# Patient Record
Sex: FEMALE | Race: WHITE | NOT HISPANIC OR LATINO | Employment: FULL TIME | URBAN - METROPOLITAN AREA
[De-identification: names, ages, dates, MRNs, and addresses within clinical notes are randomized per-mention and may not be internally consistent; named-entity substitution may affect disease eponyms.]

---

## 2017-03-17 ENCOUNTER — GENERIC CONVERSION - ENCOUNTER (OUTPATIENT)
Dept: OTHER | Facility: OTHER | Age: 27
End: 2017-03-17

## 2017-03-30 ENCOUNTER — ALLSCRIPTS OFFICE VISIT (OUTPATIENT)
Dept: OTHER | Facility: OTHER | Age: 27
End: 2017-03-30

## 2017-03-30 DIAGNOSIS — M54.9 DORSALGIA: ICD-10-CM

## 2017-04-13 ENCOUNTER — TRANSCRIBE ORDERS (OUTPATIENT)
Dept: RADIOLOGY | Facility: HOSPITAL | Age: 27
End: 2017-04-13

## 2017-04-13 ENCOUNTER — HOSPITAL ENCOUNTER (OUTPATIENT)
Dept: RADIOLOGY | Facility: HOSPITAL | Age: 27
Discharge: HOME | End: 2017-04-13
Payer: COMMERCIAL

## 2017-04-13 DIAGNOSIS — M54.9 DORSALGIA: ICD-10-CM

## 2017-04-13 PROCEDURE — 72110 X-RAY EXAM L-2 SPINE 4/>VWS: CPT

## 2017-04-14 ENCOUNTER — GENERIC CONVERSION - ENCOUNTER (OUTPATIENT)
Dept: OTHER | Facility: OTHER | Age: 27
End: 2017-04-14

## 2017-05-25 ENCOUNTER — HOSPITAL ENCOUNTER (EMERGENCY)
Facility: HOSPITAL | Age: 27
Discharge: HOME | End: 2017-05-25
Attending: EMERGENCY MEDICINE | Admitting: EMERGENCY MEDICINE
Payer: OTHER MISCELLANEOUS

## 2017-05-25 VITALS
SYSTOLIC BLOOD PRESSURE: 130 MMHG | RESPIRATION RATE: 18 BRPM | TEMPERATURE: 97.9 F | WEIGHT: 110 LBS | HEART RATE: 82 BPM | OXYGEN SATURATION: 99 % | HEIGHT: 64 IN | BODY MASS INDEX: 18.78 KG/M2 | DIASTOLIC BLOOD PRESSURE: 60 MMHG

## 2017-05-25 DIAGNOSIS — S06.0X9A CONCUSSION: Primary | ICD-10-CM

## 2017-05-25 PROCEDURE — 99283 EMERGENCY DEPT VISIT LOW MDM: CPT

## 2017-06-26 ENCOUNTER — ALLSCRIPTS OFFICE VISIT (OUTPATIENT)
Dept: OTHER | Facility: OTHER | Age: 27
End: 2017-06-26

## 2017-06-26 DIAGNOSIS — M54.9 DORSALGIA: ICD-10-CM

## 2017-07-06 ENCOUNTER — GENERIC CONVERSION - ENCOUNTER (OUTPATIENT)
Dept: OTHER | Facility: OTHER | Age: 27
End: 2017-07-06

## 2017-07-06 ENCOUNTER — APPOINTMENT (OUTPATIENT)
Dept: PHYSICAL THERAPY | Facility: REHABILITATION | Age: 27
End: 2017-07-06
Payer: COMMERCIAL

## 2017-07-06 DIAGNOSIS — M54.9 DORSALGIA: ICD-10-CM

## 2017-07-06 PROCEDURE — 97161 PT EVAL LOW COMPLEX 20 MIN: CPT

## 2017-07-06 PROCEDURE — 97110 THERAPEUTIC EXERCISES: CPT

## 2017-07-06 PROCEDURE — 97140 MANUAL THERAPY 1/> REGIONS: CPT

## 2017-07-12 ENCOUNTER — GENERIC CONVERSION - ENCOUNTER (OUTPATIENT)
Dept: OTHER | Facility: OTHER | Age: 27
End: 2017-07-12

## 2017-07-12 ENCOUNTER — APPOINTMENT (OUTPATIENT)
Dept: PHYSICAL THERAPY | Facility: REHABILITATION | Age: 27
End: 2017-07-12
Payer: COMMERCIAL

## 2017-07-12 PROCEDURE — 97140 MANUAL THERAPY 1/> REGIONS: CPT

## 2017-07-12 PROCEDURE — 97110 THERAPEUTIC EXERCISES: CPT

## 2017-07-12 PROCEDURE — 97112 NEUROMUSCULAR REEDUCATION: CPT

## 2017-07-14 ENCOUNTER — APPOINTMENT (OUTPATIENT)
Dept: PHYSICAL THERAPY | Facility: REHABILITATION | Age: 27
End: 2017-07-14
Payer: COMMERCIAL

## 2017-07-14 PROCEDURE — 97112 NEUROMUSCULAR REEDUCATION: CPT

## 2017-07-14 PROCEDURE — 97110 THERAPEUTIC EXERCISES: CPT

## 2017-07-14 PROCEDURE — 97140 MANUAL THERAPY 1/> REGIONS: CPT

## 2017-07-17 ENCOUNTER — APPOINTMENT (OUTPATIENT)
Dept: PHYSICAL THERAPY | Facility: REHABILITATION | Age: 27
End: 2017-07-17
Payer: COMMERCIAL

## 2017-07-17 PROCEDURE — 97530 THERAPEUTIC ACTIVITIES: CPT

## 2017-07-17 PROCEDURE — 97140 MANUAL THERAPY 1/> REGIONS: CPT

## 2017-07-19 ENCOUNTER — APPOINTMENT (OUTPATIENT)
Dept: PHYSICAL THERAPY | Facility: REHABILITATION | Age: 27
End: 2017-07-19
Payer: COMMERCIAL

## 2017-07-19 PROCEDURE — 97110 THERAPEUTIC EXERCISES: CPT

## 2017-07-19 PROCEDURE — 97112 NEUROMUSCULAR REEDUCATION: CPT

## 2017-07-19 PROCEDURE — 97140 MANUAL THERAPY 1/> REGIONS: CPT

## 2017-07-20 ENCOUNTER — GENERIC CONVERSION - ENCOUNTER (OUTPATIENT)
Dept: OTHER | Facility: OTHER | Age: 27
End: 2017-07-20

## 2017-07-24 ENCOUNTER — APPOINTMENT (OUTPATIENT)
Dept: PHYSICAL THERAPY | Facility: REHABILITATION | Age: 27
End: 2017-07-24
Payer: COMMERCIAL

## 2017-07-24 PROCEDURE — 97112 NEUROMUSCULAR REEDUCATION: CPT

## 2017-07-24 PROCEDURE — 97140 MANUAL THERAPY 1/> REGIONS: CPT

## 2017-07-24 PROCEDURE — 97110 THERAPEUTIC EXERCISES: CPT

## 2017-07-26 ENCOUNTER — APPOINTMENT (OUTPATIENT)
Dept: PHYSICAL THERAPY | Facility: REHABILITATION | Age: 27
End: 2017-07-26
Payer: COMMERCIAL

## 2017-07-26 PROCEDURE — 97112 NEUROMUSCULAR REEDUCATION: CPT

## 2017-07-26 PROCEDURE — 97140 MANUAL THERAPY 1/> REGIONS: CPT

## 2017-07-26 PROCEDURE — 97110 THERAPEUTIC EXERCISES: CPT

## 2017-07-31 ENCOUNTER — GENERIC CONVERSION - ENCOUNTER (OUTPATIENT)
Dept: OTHER | Facility: OTHER | Age: 27
End: 2017-07-31

## 2017-07-31 ENCOUNTER — APPOINTMENT (OUTPATIENT)
Dept: PHYSICAL THERAPY | Facility: REHABILITATION | Age: 27
End: 2017-07-31
Payer: COMMERCIAL

## 2017-07-31 PROCEDURE — 97112 NEUROMUSCULAR REEDUCATION: CPT

## 2017-07-31 PROCEDURE — 97110 THERAPEUTIC EXERCISES: CPT

## 2017-07-31 PROCEDURE — 97140 MANUAL THERAPY 1/> REGIONS: CPT

## 2017-08-10 ENCOUNTER — GENERIC CONVERSION - ENCOUNTER (OUTPATIENT)
Dept: OTHER | Facility: OTHER | Age: 27
End: 2017-08-10

## 2017-09-01 ENCOUNTER — TRANSCRIBE ORDERS (OUTPATIENT)
Dept: ADMINISTRATIVE | Facility: HOSPITAL | Age: 27
End: 2017-09-01

## 2017-09-01 ENCOUNTER — GENERIC CONVERSION - ENCOUNTER (OUTPATIENT)
Dept: OTHER | Facility: OTHER | Age: 27
End: 2017-09-01

## 2017-09-01 DIAGNOSIS — M25.531 RIGHT WRIST PAIN: Primary | ICD-10-CM

## 2017-09-01 DIAGNOSIS — M25.531 PAIN IN RIGHT WRIST: ICD-10-CM

## 2017-09-15 ENCOUNTER — HOSPITAL ENCOUNTER (OUTPATIENT)
Dept: RADIOLOGY | Facility: HOSPITAL | Age: 27
Discharge: HOME | End: 2017-09-15
Payer: COMMERCIAL

## 2017-09-15 DIAGNOSIS — M25.531 RIGHT WRIST PAIN: ICD-10-CM

## 2017-09-15 PROCEDURE — 73221 MRI JOINT UPR EXTREM W/O DYE: CPT

## 2017-09-20 ENCOUNTER — ALLSCRIPTS OFFICE VISIT (OUTPATIENT)
Dept: OTHER | Facility: OTHER | Age: 27
End: 2017-09-20

## 2018-01-10 NOTE — RESULT NOTES
Discussion/Summary   xrays of back read as normal     Verified Results  * XR SPINE LUMBAR MINIMUM 4 VIEWS NON INJURY [id number] 40:02KB Ekta Hannah Order Number: [id number]     Test Name Result Flag Reference   XR SPINE LUMBAR MINIMUM 4 VIEWS (Report)     LUMBAR SPINE     INDICATION: Worsening mid low back pain  COMPARISON: Plain radiographs of the lumbar spine September 30, 2009     VIEWS: AP, lateral and bilateral oblique projections;      IMAGES: 4     FINDINGS:     Alignment is unremarkable  Mild scoliotic curvature, concave right  There is no radiographic evidence of acute fracture or destructive osseous lesion  No significant lumbar degenerative change noted  Visualized soft tissues appear unremarkable  IMPRESSION:   No acute fracture or significant degenerative change         Workstation performed: [id number]     Signed by:   Ming Skinner DO   4/14/17

## 2018-01-11 ENCOUNTER — ALLSCRIPTS OFFICE VISIT (OUTPATIENT)
Dept: OTHER | Facility: OTHER | Age: 28
End: 2018-01-11

## 2018-01-11 NOTE — PROGRESS NOTES
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  Patient denies any recent illness  She has been trying to wean off her birth control pill in hopes that her menstrual cycle stays regular  Review of Systems    Constitutional: No fever, no chills, feels well, no tiredness, no recent weight gain or weight loss  Eyes: No complaints of eye pain, no red eyes, no eyesight problems, no discharge, no dry eyes, no itching of eyes  ENT: no complaints of earache, no loss of hearing, no nose bleeds, no nasal discharge, no sore throat, no hoarseness  Cardiovascular: No complaints of slow heart rate, no fast heart rate, no chest pain, no palpitations, no leg claudication, no lower extremity edema  Respiratory: No complaints of shortness of breath, no wheezing, no cough, no SOB on exertion, no orthopnea, no PND  Gastrointestinal: No complaints of abdominal pain, no constipation, no nausea or vomiting, no diarrhea, no bloody stools  Genitourinary: No complaints of dysuria, no incontinence, no pelvic pain, no dysmenorrhea, no vaginal discharge or bleeding  Musculoskeletal: Chronic intermittent bilateral wrist pain from history of carpal tunnel syndrome  Integumentary: No complaints of skin rash or lesions, no itching, no skin wounds, no breast pain or lump     Neurological: No complaints of headache, no confusion, no convulsions, no numbness, no dizziness or fainting, no tingling, no limb weakness, no difficulty walking  Active Problems    1  Abnormal CBC (790 6) (R79 89)   2  Carpal tunnel syndrome, unspecified laterality (354 0) (G56 00)   3  Chronic migraine without aura (346 70) (G43 709)   4  Epistaxis (784 7) (R04 0)   5  Esophageal reflux (530 81) (K21 9)   6  Ganglion cyst (727 43) (M67 40)   7  Headache (784 0) (R51)   8  Lymphocytosis (288 61) (D72 820)   9  Pharyngitis (462) (J02 9)   10  Radial Tunnel Syndrome (354 3)   11  URTI (acute upper respiratory infection) (465 9) (J06 9)   12  Vaginitis (616 10) (N76 0)    Past Medical History    · History of Abnormal bleeding time (790 92) (R79 1)   · History of irritable bowel syndrome (V12 79) (Z87 19)   · History of migraine (V12 49) (Z86 69)   · History of varicella (V12 09) (Z86 19)    Family History    · Family history of migraine headaches (V17 2) (Z82 0)   · Family history of ulcerative colitis (V18 59) (Z83 79)    · Family history of arthritis (V17 7) (Z82 61)   · Family history of malignant neoplasm (V16 9) (Z80 9)   · Family history of malignant neoplasm of breast (V16 3) (Z80 3)   · Family history of malignant neoplasm of stomach (V16 0) (Z80 0)   · Family history of melanoma (V16 8) (Z80 8)    Social History    · Caffeine use (V49 89) (F15 90)   · Never A Smoker   · No drug use   · Social alcohol use (Z78 9)    Current Meds   1  B Complete Oral Tablet; Take 1 tablet daily Recorded   2  B2 TABS; Therapy: (Recorded:12Nov2014) to Recorded   3  Butalbital-APAP-Caffeine -40 MG Oral Tablet; take 1 tablet every 6 hours as   needed; Therapy: 27XIW7758 to (Last Rx:14Ypc2956)  Requested for: 39UTB3329 Ordered   4  HM Magnesium 250 MG Oral Tablet; USE AS DIRECTED Recorded   5  Zovia 1/35E (28) 1-35 MG-MCG Oral Tablet; Therapy: (Recorded:84Qmy4557) to Recorded    Allergies    1   No Known Drug Allergies    Vitals   Recorded: 78AXS6306 02:17PM   Temperature 98 8 F   Heart Rate 80   Systolic 423 Diastolic 62   Height 5 ft 5 5 in   Weight 111 lb    BMI Calculated 18 19   BSA Calculated 1 54     Physical Exam    Constitutional   General appearance: No acute distress, well appearing and well nourished  Ears, Nose, Mouth, and Throat   External inspection of ears and nose: Normal     Otoscopic examination: Tympanic membranes translucent with normal light reflex  Canals patent without erythema  Hearing: Normal     Oropharynx: Normal with no erythema, edema, exudate or lesions  Pulmonary   Respiratory effort: No increased work of breathing or signs of respiratory distress  Auscultation of lungs: Clear to auscultation  Cardiovascular   Auscultation of heart: Normal rate and rhythm, normal S1 and S2, no murmurs  Carotid pulses: 2+ bilaterally  Examination of extremities for edema and/or varicosities: Normal     Abdomen   Abdomen: Non-tender, no masses  Liver and spleen: No hepatomegaly or splenomegaly  Lymphatic   Palpation of lymph nodes in neck: No lymphadenopathy      Musculoskeletal   Gait and station: Normal        Results/Data  Urine Dip Non-Automated- POC 13JJW2457 81:78CK Giulia Enma     Test Name Result Flag Reference   Color Yellow     Clarity Transparent     Leukocytes -     Nitrite -     Blood -     Bilirubin -     Urobilinogen 0 2     Protein -     Ph 6 0     Specific Gravity 1 000     Ketone -     Glucose -         Procedure    Procedure:   Results: 20/20 in both eyes with corrective device, 20/25 in the right eye with corrective device, 20/25 in the left eye with corrective device   Color vision was and the results were normal       Signatures   Electronically signed by : Marthe Krabbe, M D ; Mar 29 1690  2:41PM EST                       (Author)

## 2018-01-12 NOTE — CONSULTS
Assessment   1  Chronic migraine without aura (346 70) (G43 709)    Plan   Chronic migraine without aura    · Gabapentin 100 MG Oral Capsule; Take one at bedtime for five days then two at    bedtime for five days then three afterthat   Rx By: Brinda Mares; Dispense: 0 Days ; #:90 Capsule; Refill: 6;For: Chronic migraine without aura; FAITH = N; Verified Transmission to [id number] Last Updated By: System, AMDL; 1/11/2018 8:53:40 AM   · Follow-up visit in 2 months Evaluation and Treatment  [facility] with [facility] or [facility]     Status: Complete  Done: 62MZJ1886   Ordered; For: Chronic migraine without aura; Ordered By: Brinda Mares Performed:  Due: 79DDO0261; Last Updated By: Rocco Beal; 1/11/2018 8:55:35 AM    Discussion/Summary   Discussion Summary:    Preventive:  She is to continue taking propranolol 60 mg in am  will start her on Gabapentin 100 mg and increase up slowly to 300 mg qhs  Fioricet when severe but less then 5 times a week  MRI head and EMG of right upper extremity  to repeat labs as well  will do those upon follow  Chief Complaint   Chief Complaint Free Text Note Form: Patient presents for consultation for migraines  History of Present Illness   HPI: We had the pleasure of evaluating Tala Young in neurological consultation today  As you know she is a 32year old right handed female  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     arm/hand pain:  This started at age 24 and she has not had an EMG done  She has pain radiating up his arm to his shoulder  family history of aneurysms â none history of migraine headaches - mother     is your current pain level â 1/10 started at what age â did have mild her headaches when she was younger but had migraine headache at the age for 23 or injury prior to onset of headaches â 2017 may she had a concussion   They got much worse in fall thus does not think it was due to the head injury often do the headaches occur â 20 a month  January- 16, February- 23, March- 20, April -13, may â 22, June â 9, July 9, Aug  15, September 14, October 10, November - 14, December â 16  time of the day do the headaches start â anytime of the day  long do the headaches last â few hours to all day â if it starts in the morning then it will last all day you ever headache free - yes are they located â temporal : tight band, left supra orbital, occipital sometimes is the intensity of pain â average 3/10 and recently it has been up to 6-7/10 warning prior to headache and how long do they last â lacrimation, blurry vision, scatoma which floaters but these do not happen often your usual headache - Nagging, Aching, Dull, Tight band symptoms:  Decrease of appetite, nausea (almost always) Photophobia, phonophobia, sensitivity to smell  Problem with concentration Change in pupil size (small) Lacrimation Tinnitus, light-headed or dizzy, stiff or sore neck,  prefer to be alone and in a dark room, unable to work are worse if the patient: cough, sneeze, bending over trigger: Fatigue, Stress/Tension, Weather change, Menstruation, Alcohol, Coughing, lack of sleep, smells, crying, loud noises, sun light, cold weather you current pregnant or planning on getting pregnant? none time of the year do headaches occur more frequently - less in summer when she is off from school you seen someone else for headaches or pain â yes our pa in 2014 you had trigger point injection performed and how often - none you had Botox injection performed and how often - none you had epidural injections or transforaminal injections performed - no medications do you take or have you taken for your headaches?  propranolol, venlafaxine,  Relpax, Fioricet,  Treatments used in the past for headaches: none     ROS personally          Review of Systems   Neurological ROS:      Constitutional: recent weight loss,-- fatigue-- and-- appetite changes  HEENT: feeling congested  Cardiovascular:  no chest pain or pressure, no palpitations present, the heart rate was not rapid or irregular, no swelling in the arms or legs, no poor circulation  Respiratory: unusual or persistant cough  Gastrointestinal: nausea  Genitourinary:  no incontinence, no feelings of urinary urgency, no increase in frequency, no urinary hesitancy, no dysuria, no hematuria  Musculoskeletal:  no arthralgias, no myalgias, no immobility or loss of function, no head/neck/back pain, no pain while walking  Integumentary  no masses, no rash, no skin lesions, no livedo reticularis  Psychiatric: anxiety-- and-- mood swings  Endocrine menstrual period change or irregularity  Hematologic/Lymphatic:  no unusual bleeding, no tendency for easy bruising, no clotting skin or lumps  Neurological General: headache,-- lightheadedness,-- increased sleepiness,-- trouble falling asleep-- and-- waking up at night  Neurological Mental Status: memory problems  Neurological Cranial Nerves: vertigo or dizziness  Neurological Motor findings include:  no tremor, no twitching, no cramping(pre/post exercise), no atrophy  Neurological Coordination:  no unsteadiness, no vertigo or dizziness, no clumsiness, no problems reaching for objects  Neurological Sensory:  no numbness, no pain, no tingling, does not fall when eyes closed or taking a shower  Neurological Gait:  no difficulty walking, not falling to one side, no sensation of being pushed, has not had falls  ROS Reviewed:    ROS reviewed  Active Problems   1  Abnormal CBC (790 6) (R79 89)   2  Backache (724 5) (M54 9)   3  Carpal tunnel syndrome, unspecified laterality (354 0) (G56 00)   4  Chronic migraine without aura (346 70) (G43 709)   5  Epistaxis (784 7) (R04 0)   6  Esophageal reflux (530 81) (K21 9)   7  Ganglion cyst (727 43) (M67 40)   8  Headache (784 0) (R51)   9  Lymphocytosis (288 61) (D72 820)   10  Pharyngitis (462) (J02 9)   11  PPD screening test (V74 1) (Z11 1)   12  Radial Tunnel Syndrome (354 3)   13  Right wrist pain (719 43) (M25 531)   14  URTI (acute upper respiratory infection) (465 9) (J06 9)   15  Vaginitis (616 10) (N76 0)    Past Medical History   1  History of Abnormal bleeding time (790 92) (R79 1)   2  History of irritable bowel syndrome (V12 79) (Z87 19)   3  History of migraine (V12 49) (Z86 69)   4  History of varicella (V12 09) (Z86 19)  Active Problems And Past Medical History Reviewed: The active problems and past medical history were reviewed and updated today  Surgical History   Surgical History Reviewed: The surgical history was reviewed and updated today  Family History   Mother    1  Family history of migraine headaches (V17 2) (Z82 0)   2  Family history of ulcerative colitis (V18 59) (Z83 79)  Family History    3  Family history of arthritis (V17 7) (Z82 61)   4  Family history of malignant neoplasm (V16 9) (Z80 9)   5  Family history of malignant neoplasm of breast (V16 3) (Z80 3)   6  Family history of malignant neoplasm of stomach (V16 0) (Z80 0)   7  Family history of melanoma (V16 8) (Z80 8)  Family History Reviewed: The family history was reviewed and updated today  Social History    · Caffeine use (V49 89) (F15 90)   · Never A Smoker   · No drug use   · Social alcohol use (Z78 9)  Social History Reviewed: The social history was reviewed and updated today  Current Meds    1  Butalbital-APAP-Caffeine -40 MG Oral Tablet; take 1 tablet every 6 hours as     needed; Therapy: 17CWS2955 to (Last Rx:59Mis7377)  Requested for: 88XNN3284 Ordered   2  Propranolol HCl ER 60 MG Oral Capsule Extended Release 24 Hour; take 1 capsule     daily; Therapy: 10JBQ3799 to (Brittany Daily)  Requested for: 55HLC1684; Last     Rx:06Emk6898 Ordered   3  Zovia 1/35E (28) 1-35 MG-MCG Oral Tablet;      Therapy: (Recorded:98Crz8630) to Recorded  Medication List Reviewed: The medication list was reviewed and updated today  Allergies   1  No Known Drug Allergies    Vitals   Signs   Recorded: 19MBF8105 08:18AM   Heart Rate: 85  Systolic: 524, LUE, Sitting  Diastolic: 60, LUE, Sitting  Height: 5 ft 5 in  Weight: 105 lb 3 oz  BMI Calculated: 17 5  BSA Calculated: 1 51  O2 Saturation: 97    Physical Exam        Constitutional      General Appearance: Appears appropriate for age, healthy, well developed, appropriately groomed and appropriately dressed       Head and Face      Head and face: Atraumatic on inspection  Facial strength normal       Eyes      Ophthalmoscopic examination: Vision is grossly normal  Gross visual field testing by confrontation shows no abnormalities  EOMI in both eyes  Conjunctivae clear  Eyelids normal palpebral fissures equal  Orbits exhibit normal position  No discharge from the eyes  PERRL  External inspection of ears and nose: Normal          Neck      Neck and thyroid: Normal to inspection and palpation  Pulmonary      Respiratory effort: Lungs are clear bilaterally  Cardiovascular      Auscultation of heart: Rate is regular  Rhythm is regular  Musculoskeletal      Gait and Station: Walks with normal gait  Tandem walk test is normal  Romberg's test is negative  Muscle strength: Normal strength throughout  Muscle tone: No atrophy, abnormal movements, flaccidity, cogwheeling or spasticity  Range of motion: Normal      Stability: Normal      Inspection of temporomandibular joint appears normal        Skin      Skin: skin warm and dry with no evidence of unusual rashes or suspicious lesions  Neurologic      Orientation to person, place, and time: Normal        Attention span and concentration: Normal thought process and attention span  Language: Names objects, able to repeat phrases and speaks spontaneously         Fund of knowledge: Normal vocabulary with appropriate knowledge of current events and past history  Sensation: Intact sensation to pinprick, temperature, vibration, and proprioception in all four extremities  Reflexes: DTR's are normal and symmetric bilaterally  Babinski's reflex is negative bilaterally  No pathologic ankle clonus  Coordination: Cerebellum function intact  No involuntary movement or psychomotor activity  Motor System: No pronator drift  Upper Extremities: Normal to inspection  No tenderness over the upper extremities bilaterally  No instability bilaterally  Strength: Motor strength is 5/5 bilaterally  Normal muscle tone bilaterally  Muscle bulk: Muscle bulk is normal bilaterally  Full ROM bilaterally  Lower Extremities: Normal to inspection and palpation  No tenderness of the lower extremities bilaterally  Exhibits no instability bilaterally  Strength: Motor strength is 5/5 bilaterally  Normal muscle tone bilaterally  Muscle Bulk: Muscle bulk is normal bilaterally  Full ROM bilaterally  Cranial Nerve Exam      II: Normal with no deficit  III,IV, VI: Normal with no deficit  V: Normal with no deficit  VII: Normal with no deficit  VIII: Normal with no deficit  IX: Normal with no deficit  X: Normal with no deficit  XI: Normal with no deficit  XII: Normal with no deficit  Recent and remote memory: Intact       Mood and affect: Normal        Signatures    Electronically signed by : Tate Alfonso MD; Jan 11 2018  9:18AM EST                       (Author)

## 2018-01-13 VITALS
TEMPERATURE: 97 F | HEART RATE: 70 BPM | HEIGHT: 66 IN | RESPIRATION RATE: 14 BRPM | SYSTOLIC BLOOD PRESSURE: 102 MMHG | DIASTOLIC BLOOD PRESSURE: 80 MMHG | BODY MASS INDEX: 17.24 KG/M2 | WEIGHT: 107.25 LBS

## 2018-01-13 NOTE — CONSULTS
Chief Complaint  Chief Complaint Free Text Note Form: Patient returns for follow-up right dorsal wrist pain times several months  History of Present Illness  HPI: Patient is a 22-year-old very pleasant, right-hand-dominant female who presents for follow-up to her right wrist pain she was last seen in the office on 9/1/17 for similar symptoms  We did order an MRI at that time  Patient admits her pain has improved with activity modification and bracing  She does bring up intermittent numbness and tingling of her right index and long finger  This is mostly on the dorsal aspect of his fingers  She denies any related trauma  Review of Systems  ROS Reviewed:   ROS reviewed  Active Problems    1  Abnormal CBC (790 6) (R79 89)   2  Backache (724 5) (M54 9)   3  Carpal tunnel syndrome, unspecified laterality (354 0) (G56 00)   4  Chronic migraine without aura (346 70) (G43 709)   5  Epistaxis (784 7) (R04 0)   6  Esophageal reflux (530 81) (K21 9)   7  Ganglion cyst (727 43) (M67 40)   8  Headache (784 0) (R51)   9  Lymphocytosis (288 61) (D72 820)   10  Pharyngitis (462) (J02 9)   11  PPD screening test (V74 1) (Z11 1)   12  Radial Tunnel Syndrome (354 3)   13  URTI (acute upper respiratory infection) (465 9) (J06 9)   14  Vaginitis (616 10) (N76 0)    Past Medical History    1  History of Abnormal bleeding time (790 92) (R79 1)   2  History of irritable bowel syndrome (V12 79) (Z87 19)   3  History of migraine (V12 49) (Z86 69)   4  History of varicella (V12 09) (Z86 19)    Family History    1  Family history of migraine headaches (V17 2) (Z82 0)   2  Family history of ulcerative colitis (V18 59) (Z83 79)    3  Family history of arthritis (V17 7) (Z82 61)   4  Family history of malignant neoplasm (V16 9) (Z80 9)   5  Family history of malignant neoplasm of breast (V16 3) (Z80 3)   6  Family history of malignant neoplasm of stomach (V16 0) (Z80 0)   7   Family history of melanoma (V16 8) (Z80 8)    Social History    · Caffeine use (V49 89) (F15 90)   · Never A Smoker   · No drug use   · Social alcohol use (Z78 9)    Current Meds   1  Butalbital-APAP-Caffeine -40 MG Oral Tablet; take 1 tablet every 6 hours as   needed; Therapy: 50WFS5516 to (Last Rx:28Fdv0886)  Requested for: 46EQK4232 Ordered   2  Propranolol HCl ER 60 MG Oral Capsule Extended Release 24 Hour; TAKE 1 CAPSULE   Daily; Therapy: 88AKO4131 to (Last Rx:66Zpi4150)  Requested for: 66UZV1880 Ordered   3  Zovia 1/35E (28) 1-35 MG-MCG Oral Tablet; Therapy: (Recorded:39Vxl3237) to Recorded    Allergies    1  No Known Drug Allergies    Vitals  Signs   Recorded: 63UFK7492 10:03AM   Heart Rate: 76  Systolic: 187  Diastolic: 67  Height: 5 ft 5 in  Weight: 112 lb   BMI Calculated: 18 64  BSA Calculated: 1 55    Physical Exam      General: No acute distress, age-appropriate  Cardiovascular: No discernable arrhythmia  Respiratory: Breathing is even and unlabored, no stridor or audible wheezing  R Wrist Examination:   Right hand/wrist: There is no gross deformity, no erythema, edema, or ecchymosis  There is some tendernessover the dorsal aspect of her wrist there is an audible clicking with wrist flexion and extension  Positive Chan's test  Negative compression test  Negative Tinel's  Sensation is intact full composite fist formation  Plus radial and ulnar pulses  Results/Data  Diagnostic Studies Reviewed:   Diagnostic Review MRI of the right wrist demonstrates a small perforation in the TFCC complex, as well as scar tissue from previous ganglion cyst excision  Assessment    1  Right wrist pain (499 43) (F11 531)    Plan  Right wrist pain    1   [facility] Instruction Sheet Wrist Sprains given; Status:Complete;   Done: 62CQM1423    Discussion/Summary  Discussion Summary:   Discussed symptoms from dorsal wrist syndrome,  We also discussed conservative versus nonconservative treatment options to include: Cortisone injections and wrist arthroscopically and debridement  At this time, patient would like to continue with conservative treatment of bracing and activity modification  Activities and weightbearing as tolerated  Call if condition worsens  Follow-up in 2 months for reevaluation/as needed        Future Appointments    Signatures   Electronically signed by : WALLACE Luna; Sep 20 2017 10:52AM EST                       (Author)    Electronically signed by : Jearldine Goodell, M D ; Sep 21 2017  9:27AM EST                       (Author)

## 2018-01-14 VITALS
HEART RATE: 77 BPM | HEIGHT: 65 IN | BODY MASS INDEX: 18.49 KG/M2 | DIASTOLIC BLOOD PRESSURE: 80 MMHG | WEIGHT: 111 LBS | SYSTOLIC BLOOD PRESSURE: 105 MMHG

## 2018-01-15 VITALS
HEIGHT: 65 IN | DIASTOLIC BLOOD PRESSURE: 67 MMHG | BODY MASS INDEX: 18.66 KG/M2 | SYSTOLIC BLOOD PRESSURE: 100 MMHG | HEART RATE: 76 BPM | WEIGHT: 112 LBS

## 2018-01-15 NOTE — PROGRESS NOTES
Chief Complaint  Nurse visit for PPD placement  Pt was advised to return in 48-72 hours for result reading  Active Problems    1  Abnormal CBC (790 6) (R79 89)   2  Carpal tunnel syndrome, unspecified laterality (354 0) (G56 00)   3  Chronic migraine without aura (346 70) (G43 709)   4  Epistaxis (784 7) (R04 0)   5  Esophageal reflux (530 81) (K21 9)   6  Ganglion cyst (727 43) (M67 40)   7  Headache (784 0) (R51)   8  Lymphocytosis (288 61) (D72 820)   9  Pharyngitis (462) (J02 9)   10  Radial Tunnel Syndrome (354 3)   11  URTI (acute upper respiratory infection) (465 9) (J06 9)   12  Vaginitis (616 10) (N76 0)    Current Meds   1  B Complete Oral Tablet; Take 1 tablet daily Recorded   2  B2 TABS; Therapy: (Recorded:12Nov2014) to Recorded   3  Butalbital-APAP-Caffeine -40 MG Oral Tablet; take 1 tablet every 6 hours as   needed; Therapy: 62RXZ9493 to (Last Rx:24Cki6703)  Requested for: 51EOW5848 Ordered   4  HM Magnesium 250 MG Oral Tablet; USE AS DIRECTED Recorded   5  Zovia 1/35E (28) 1-35 MG-MCG Oral Tablet; Therapy: (Recorded:48Cij5624) to Recorded    Allergies    1   No Known Drug Allergies    Plan  PPD screening test    · Tubersol 5 UNIT/0 1ML Intradermal Solution    Signatures   Electronically signed by : Barbara Hancock MD; Jul 12 2016  2:59PM EST                       (Author)

## 2018-01-16 NOTE — CONSULTS
Chief Complaint  Patient returns for follow-up right dorsal wrist pain times several months  History of Present Illness  HPI: Patient is a 70-year-old very pleasant, right-hand-dominant female who presents for follow-up to her right wrist pain she was last seen in the office on 9/1/17 for similar symptoms  We did order an MRI at that time  Patient admits her pain has improved with activity modification and bracing  She does bring up intermittent numbness and tingling of her right index and long finger  This is mostly on the dorsal aspect of his fingers  She denies any related trauma  Review of Systems    ROS reviewed  Active Problems    1  Abnormal CBC (790 6) (R79 89)   2  Backache (724 5) (M54 9)   3  Carpal tunnel syndrome, unspecified laterality (354 0) (G56 00)   4  Chronic migraine without aura (346 70) (G43 709)   5  Epistaxis (784 7) (R04 0)   6  Esophageal reflux (530 81) (K21 9)   7  Ganglion cyst (727 43) (M67 40)   8  Headache (784 0) (R51)   9  Lymphocytosis (288 61) (D72 820)   10  Pharyngitis (462) (J02 9)   11  PPD screening test (V74 1) (Z11 1)   12  Radial Tunnel Syndrome (354 3)   13  URTI (acute upper respiratory infection) (465 9) (J06 9)   14   Vaginitis (616 10) (N76 0)    Past Medical History    · History of Abnormal bleeding time (790 92) (R79 1)   · History of irritable bowel syndrome (V12 79) (Z87 19)   · History of migraine (V12 49) (Z86 69)   · History of varicella (V12 09) (Z86 19)    Family History    · Family history of migraine headaches (V17 2) (Z82 0)   · Family history of ulcerative colitis (V18 59) (Z83 79)    · Family history of arthritis (V17 7) (Z82 61)   · Family history of malignant neoplasm (V16 9) (Z80 9)   · Family history of malignant neoplasm of breast (V16 3) (Z80 3)   · Family history of malignant neoplasm of stomach (V16 0) (Z80 0)   · Family history of melanoma (V16 8) (Z80 8)    Social History    · Caffeine use (V49 89) (F15 90)   · Never A Smoker · No drug use   · Social alcohol use (Z78 9)    Current Meds   1  Butalbital-APAP-Caffeine -40 MG Oral Tablet; take 1 tablet every 6 hours as   needed; Therapy: 31GMP9707 to (Last Rx:34Doc9543)  Requested for: 92UQA1945 Ordered   2  Propranolol HCl ER 60 MG Oral Capsule Extended Release 24 Hour; TAKE 1 CAPSULE   Daily; Therapy: 98DHS9439 to (Last Rx:55Nvo4062)  Requested for: 49SIU8416 Ordered   3  Zovia 1/35E (28) 1-35 MG-MCG Oral Tablet; Therapy: (Recorded:49Htd6233) to Recorded    Allergies    1  No Known Drug Allergies    Vitals  Signs    Heart Rate: 69XDQSAGME: 433LSDQUFQBO: 30RRLSUA: 5 ft 5 inWeight: 112 lb BMI Calculated: 18 64BSA Calculated: 1 55    Physical Exam      General: No acute distress, age-appropriate  Cardiovascular: No discernable arrhythmia  Respiratory: Breathing is even and unlabored, no stridor or audible wheezing  R Wrist Examination:   Right hand/wrist: There is no gross deformity, no erythema, edema, or ecchymosis  There is some tendernessover the dorsal aspect of her wrist there is an audible clicking with wrist flexion and extension  Positive Chan's test  Negative compression test  Negative Tinel's  Sensation is intact full composite fist formation  Plus radial and ulnar pulses  Results/Data    Diagnostic Review MRI of the right wrist demonstrates a small perforation in the TFCC complex, as well as scar tissue from previous ganglion cyst excision  Assessment    1  Right wrist pain (439 43) (P89 721)    Plan     Right wrist pain    · ASS[facility] Instruction Sheet Wrist Sprains given; Status:Complete;   Done: 80OAL2863    Discussion/Summary    Discussed symptoms from dorsal wrist syndrome,  We also discussed conservative versus nonconservative treatment options to include: Cortisone injections and wrist arthroscopically and debridement    At this time, patient would like to continue with conservative treatment of bracing and activity modification  Activities and weightbearing as tolerated  Call if condition worsens  Follow-up in 2 months for reevaluation/as needed        Signatures   Electronically signed by : WALLACE Alejandra; Sep 20 2017 10:52AM EST                       (Author)    Electronically signed by : KARIE Velez ; Sep 21 2017  9:27AM EST                       (Author)

## 2018-01-17 NOTE — RESULT NOTES
Verified Results  (1) CBC/PLT/DIFF 30WXZ9022 90:73NH Ortiz Dominguez   TW Order Number: [id number]    TW Order Number: [id number]     Test Name Result Flag Reference   WBC COUNT 5 58 Thousand/uL  4 31-10 16   RBC COUNT 4 99 Million/uL  3 81-5 12   HEMOGLOBIN 14 3 g/dL  11 5-15 4   HEMATOCRIT 43 7 %  34 8-46  1   MCV 88 fL  82-98   MCH 28 7 pg  26 8-34 3   MCHC 32 7 g/dL  31 4-37 4   RDW 13 3 %  11 6-15 1   MPV 12 5 fL  8 9-12 7   PLATELET COUNT 373 Thousands/uL  149-390   nRBC AUTOMATED 0 /100 WBCs     NEUTROPHILS RELATIVE PERCENT 38 % L 43-75   LYMPHOCYTES RELATIVE PERCENT 49 % H 14-44   MONOCYTES RELATIVE PERCENT 10 %  4-12   EOSINOPHILS RELATIVE PERCENT 2 %  0-6   BASOPHILS RELATIVE PERCENT 1 %  0-1   NEUTROPHILS ABSOLUTE COUNT 2 13 Thousands/µL  1 85-7 62   LYMPHOCYTES ABSOLUTE COUNT 2 77 Thousands/µL  0 60-4 47   MONOCYTES ABSOLUTE COUNT 0 54 Thousand/µL  0 17-1 22   EOSINOPHILS ABSOLUTE COUNT 0 09 Thousand/µL  0 00-0 61   BASOPHILS ABSOLUTE COUNT 0 04 Thousands/µL  0 00-0 10     (1) COMPREHENSIVE METABOLIC PANEL 53JNV5891 09:87PV Ortiz Dominguez    Order Number: [id number]      National Kidney Disease Education Program recommendations are as follows:  GFR calculation is accurate only with a steady state creatinine  Chronic Kidney disease less than 60 ml/min/1 73 sq  meters  Kidney failure less than 15 ml/min/1 73 sq  meters  Test Name Result Flag Reference   GLUCOSE,RANDM 72 mg/dL     If the patient is fasting, the ADA then defines impaired fasting glucose as > 100 mg/dL and diabetes as > or equal to 123 mg/dL     SODIUM 140 mmol/L  136-145   POTASSIUM 4 1 mmol/L  3 5-5 3   CHLORIDE 104 mmol/L  100-108   CARBON DIOXIDE 28 mmol/L  21-32   ANION GAP (CALC) 8 mmol/L  4-13   BLOOD UREA NITROGEN 11 mg/dL  5-25   CREATININE 0 70 mg/dL  0 60-1 30   Standardized to IDMS reference method   CALCIUM 8 9 mg/dL  8 3-10 1   BILI, TOTAL 0 56 mg/dL  0 20-1 00   ALK PHOSPHATAS 71 U/L     ALT (SGPT) 34 U/L 12-78   AST(SGOT) 23 U/L  5-45   ALBUMIN 4 0 g/dL  3 5-5 0   TOTAL PROTEIN 8 0 g/dL  6 4-8 2   eGFR Non-African American      >60 0 ml/min/1 73sq m     (1) LIPID PANEL, FASTING [id number] 81:34IC Shakila Vanegas    Order Number: [id number]      Triglyceride:         Normal              <150 mg/dl       Borderline High    150-199 mg/dl       High               200-499 mg/dl       Very High          >499 mg/dl  Cholesterol:         Desirable        <200 mg/dl      Borderline High  200-239 mg/dl      High             >239 mg/dl  HDL Cholesterol:        High    >59 mg/dL      Low     <41 mg/dL     Test Name Result Flag Reference   CHOLESTEROL 211 mg/dL H    HDL,DIRECT 89 mg/dL H 40-60   LDL CHOLESTEROL CALCULATED 113 mg/dL H 0-100   TRIGLYCERIDES 47 mg/dL  <=150   Specimen collection should occur prior to N-Acetylcysteine or Metamizole administration due to the potential for falsely depressed results  (1) VITAMIN D 25-HYDROXY [id number] 36:23EC Diana Big Order Number: [id number]     Order Number: [id number]     Test Name Result Flag Reference   VIT D 25-HYDROX 40 4 ng/mL  30 0-100 0     (1) TSH 46CRL4819 03:72RB Diana Big Order Number: [id number]    Patients undergoing fluorescein dye angiography may retain small amounts of fluorescein in the body for 48-72 hours post procedure  Samples containing fluorescein can produce falsely depressed TSH values  If the patient had this procedure,a specimen should be resubmitted post fluorescein clearance          The recommended reference ranges for TSH during pregnancy are as follows:  First trimester 0 1 to 2 5 uIU/mL  Second trimester  0 2 to 3 0 uIU/mL  Third trimester 0 3 to 3 0 uIU/m     Test Name Result Flag Reference   TSH 1 450 uIU/mL  0 358-3 740       Discussion/Summary   bw looked ok for her

## 2018-01-22 VITALS
BODY MASS INDEX: 18.74 KG/M2 | HEART RATE: 82 BPM | HEIGHT: 65 IN | SYSTOLIC BLOOD PRESSURE: 103 MMHG | DIASTOLIC BLOOD PRESSURE: 67 MMHG | WEIGHT: 112.5 LBS

## 2018-01-23 VITALS
BODY MASS INDEX: 17.52 KG/M2 | SYSTOLIC BLOOD PRESSURE: 100 MMHG | HEIGHT: 65 IN | HEART RATE: 85 BPM | DIASTOLIC BLOOD PRESSURE: 60 MMHG | WEIGHT: 105.19 LBS | OXYGEN SATURATION: 97 %

## 2018-01-31 DIAGNOSIS — G43.709 CHRONIC MIGRAINE WITHOUT AURA WITHOUT STATUS MIGRAINOSUS, NOT INTRACTABLE: Primary | ICD-10-CM

## 2018-01-31 RX ORDER — DEXAMETHASONE 2 MG/1
2 TABLET ORAL
Qty: 5 TABLET | Refills: 0 | Status: SHIPPED | OUTPATIENT
Start: 2018-01-31 | End: 2018-02-15

## 2018-01-31 NOTE — TELEPHONE ENCOUNTER
Pt called stated that she had about 1 week with no migraines, then on Sunday night she developed a bad migraine and this continued through Monday and Tuesday and she missed work today  States that she took her fioricet on Monday and Tuesday  Pt states that she continnues with the headache today  Pt has not tried decadron in the past     Clinical team: we do not have to call back if decadron sent to pharmacy  Pharmacy on file verified

## 2018-02-05 ENCOUNTER — TELEPHONE (OUTPATIENT)
Dept: NEUROLOGY | Facility: CLINIC | Age: 28
End: 2018-02-05

## 2018-02-05 DIAGNOSIS — G43.709 CHRONIC MIGRAINE WITHOUT AURA WITHOUT STATUS MIGRAINOSUS, NOT INTRACTABLE: Primary | ICD-10-CM

## 2018-02-05 RX ORDER — TOPIRAMATE 25 MG/1
CAPSULE, COATED PELLETS ORAL
Qty: 100 CAPSULE | Refills: 0 | Status: SHIPPED | OUTPATIENT
Start: 2018-02-05 | End: 2018-04-05 | Stop reason: SINTOL

## 2018-02-05 NOTE — TELEPHONE ENCOUNTER
Pt called states that she was started on Neurontin last month  She was also on Decadon which was slightly effective  She stated that "last week since I started taking the Neurontin I've been experiencing depression and suicidal thoughts but no plan"  She denies any thoughts of suicidal thoughts at this time  Declined to take Neurontin d/t this possible side effects  She is requesting other alternative and advice to help with her migraine        [phone]

## 2018-02-14 RX ORDER — ETHYNODIOL DIACETATE AND ETHINYL ESTRADIOL 1 MG-35MCG
1 KIT ORAL DAILY
COMMUNITY
End: 2019-12-10 | Stop reason: SDUPTHER

## 2018-02-14 RX ORDER — GABAPENTIN 100 MG/1
CAPSULE ORAL
Refills: 6 | COMMUNITY
Start: 2018-01-11 | End: 2018-02-15

## 2018-02-15 ENCOUNTER — OFFICE VISIT (OUTPATIENT)
Dept: FAMILY MEDICINE CLINIC | Facility: CLINIC | Age: 28
End: 2018-02-15
Payer: COMMERCIAL

## 2018-02-15 VITALS
HEART RATE: 64 BPM | TEMPERATURE: 97.3 F | BODY MASS INDEX: 17.75 KG/M2 | HEIGHT: 64 IN | DIASTOLIC BLOOD PRESSURE: 76 MMHG | WEIGHT: 104 LBS | SYSTOLIC BLOOD PRESSURE: 104 MMHG | RESPIRATION RATE: 14 BRPM

## 2018-02-15 DIAGNOSIS — R51.9 GENERALIZED HEADACHES: Primary | ICD-10-CM

## 2018-02-15 PROCEDURE — 99213 OFFICE O/P EST LOW 20 MIN: CPT | Performed by: FAMILY MEDICINE

## 2018-02-15 RX ORDER — BUTALBITAL, ACETAMINOPHEN AND CAFFEINE 300; 40; 50 MG/1; MG/1; MG/1
1 CAPSULE ORAL EVERY 6 HOURS PRN
Qty: 90 CAPSULE | Refills: 0 | Status: SHIPPED | OUTPATIENT
Start: 2018-02-15 | End: 2018-02-19 | Stop reason: DRUGHIGH

## 2018-02-15 RX ORDER — PROPRANOLOL HYDROCHLORIDE 80 MG/1
80 CAPSULE, EXTENDED RELEASE ORAL DAILY
Qty: 90 CAPSULE | Refills: 1 | Status: SHIPPED | OUTPATIENT
Start: 2018-02-15 | End: 2018-09-04 | Stop reason: SDUPTHER

## 2018-02-15 NOTE — TELEPHONE ENCOUNTER
Called and lmom for pt to call the office back  3rd attempt to reach pt  Will mail home a letter asking pt to call the office

## 2018-02-15 NOTE — PROGRESS NOTES
FAMILY PRACTICE OFFICE VISIT       NAME: Regino Bolden  AGE: 32 y o  SEX: female       : 1990        MRN: [id number]    DATE: 2/15/2018  TIME: 7:53 PM    Assessment and Plan     Problem List Items Addressed This Visit     Generalized headaches - Primary     Headaches  Winter long discussion with the patient regarding her symptoms and treatment options  We will increase propranolol to 80 milligrams daily for prophylaxis  She was also given a refill on her Fioricet for episodic headaches  If symptoms persist we will consider SSRI treatment for prophylaxis  Patient wished to hold off on medications such as Topamax         Relevant Medications    propranolol (INDERAL LA) 80 mg 24 hr capsule    Butalbital-APAP-Caffeine (FIORICET) -40 MG CAPS            There are no Patient Instructions on file for this visit  Chief Complaint     Chief Complaint   Patient presents with    Migraine     Patient is here c/o an ongoing issue with migraines which are not improving  History of Present Illness     Patient had seen [facility] and was tried up on gabapentin which caused her headaches to increase as well as causing significant fatigue  Patient was weaned off medication  She was reluctant to try Topamax due to her interaction with birth control pill  She states initially propranolol did help with frequency of her headaches  Fioricet does help on occasion with acute treatment for headache  Patient states she experiences a migraine type headache at least 10-20 days per month  Review of Systems   Review of Systems   Constitutional: Positive for fatigue  HENT: Negative  Eyes: Negative  Respiratory: Negative  Cardiovascular: Negative  Neurological: Positive for headaches          As per HPI   Psychiatric/Behavioral:        Patient admits to some mild anxiety due to instability of her work situation as a        Active Problem List     Patient Active Problem List   Diagnosis    Carpal tunnel syndrome    Chronic migraine without aura    Epistaxis    Esophageal reflux    Vaginitis    Generalized headaches       Past Medical History:  Past Medical History:   Diagnosis Date    Carpal tunnel syndrome     Migraine        Past Surgical History:  No past surgical history on file  Family History:  No family history on file  Social History:  Social History     Social History    Marital status: Single     Spouse name: N/A    Number of children: N/A    Years of education: N/A     Occupational History    Not on file  Social History Main Topics    Smoking status: Never Smoker    Smokeless tobacco: Never Used    Alcohol use No    Drug use: No    Sexual activity: Not on file     Other Topics Concern    Not on file     Social History Narrative    No narrative on file       Objective     Vitals:    02/15/18 1611   BP: 104/76   Pulse: 64   Resp: 14   Temp: (!) 97 3 °F (36 3 °C)     Wt Readings from Last 3 Encounters:   02/15/18 47 2 kg (104 lb)   01/11/18 47 7 kg (105 lb 3 oz)   09/20/17 50 8 kg (112 lb)       Physical Exam   Constitutional: She is oriented to person, place, and time  Patient no acute distress   Neurological: She is alert and oriented to person, place, and time  No cranial nerve deficit  Psychiatric: She has a normal mood and affect   Her behavior is normal  Judgment and thought content normal        Pertinent Laboratory/Diagnostic Studies:  Lab Results   Component Value Date    GLUCOSE 72 04/09/2016    BUN 11 04/09/2016    CREATININE 0 70 04/09/2016    CALCIUM 8 9 04/09/2016     04/09/2016    K 4 1 04/09/2016    CO2 28 04/09/2016     04/09/2016     Lab Results   Component Value Date    ALT 34 04/09/2016    AST 23 04/09/2016    ALKPHOS 71 04/09/2016    BILITOT 0 56 04/09/2016       Lab Results   Component Value Date    WBC 5 58 04/09/2016    HGB 14 3 04/09/2016    HCT 43 7 04/09/2016    MCV 88 04/09/2016    PLT 220 04/09/2016       No results found for: TSH    Lab Results   Component Value Date    CHOL 211 (H) 04/09/2016     Lab Results   Component Value Date    TRIG 47 04/09/2016     Lab Results   Component Value Date    HDL 89 (H) 04/09/2016     Lab Results   Component Value Date    LDLCALC 113 (H) 04/09/2016     No results found for: HGBA1C    Results for orders placed or performed in visit on 04/09/16   CBC and differential   Result Value Ref Range    WBC 5 58 4 31 - 10 16 Thousand/uL    RBC 4 99 3 81 - 5 12 Million/uL    Hemoglobin 14 3 11 5 - 15 4 g/dL    Hematocrit 43 7 34 8 - 46 1 %    MCV 88 82 - 98 fL    MCH 28 7 26 8 - 34 3 pg    MCHC 32 7 31 4 - 37 4 g/dL    RDW 13 3 11 6 - 15 1 %    MPV 12 5 8 9 - 12 7 fL    Platelets 432 841 - 190 Thousands/uL    nRBC 0 /100 WBCs    Neutrophils Relative 38 (L) 43 - 75 %    Lymphocytes Relative 49 (H) 14 - 44 %    Monocytes Relative 10 4 - 12 %    Eosinophils Relative 2 0 - 6 %    Basophils Relative 1 0 - 1 %    Neutrophils Absolute 2 13 1 85 - 7 62 Thousands/µL    Lymphocytes Absolute 2 77 0 60 - 4 47 Thousands/µL    Monocytes Absolute 0 54 0 17 - 1 22 Thousand/µL    Eosinophils Absolute 0 09 0 00 - 0 61 Thousand/µL    Basophils Absolute 0 04 0 00 - 0 10 Thousands/µL   Comprehensive metabolic panel   Result Value Ref Range    Sodium 140 136 - 145 mmol/L    Potassium 4 1 3 5 - 5 3 mmol/L    Chloride 104 100 - 108 mmol/L    CO2 28 21 - 32 mmol/L    Anion Gap 8 4 - 13 mmol/L    BUN 11 5 - 25 mg/dL    Creatinine 0 70 0 60 - 1 30 mg/dL    Glucose 72 65 - 140 mg/dL    Calcium 8 9 8 3 - 10 1 mg/dL    AST 23 5 - 45 U/L    ALT 34 12 - 78 U/L    Alkaline Phosphatase 71 46 - 116 U/L    Total Protein 8 0 6 4 - 8 2 g/dL    Albumin 4 0 3 5 - 5 0 g/dL    Total Bilirubin 0 56 0 20 - 1 00 mg/dL    eGFR >60 0 ml/min/1 73sq m   Lipid panel   Result Value Ref Range    Cholesterol 211 (H) 50 - 200 mg/dL    Triglycerides 47 <=150 mg/dL    HDL, Direct 89 (H) 40 - 60 mg/dL    LDL Calculated 113 (H) 0 - 100 mg/dL   Vitamin D 25 hydroxy   Result Value Ref Range    Vit D, 25-Hydroxy 40 4 30 0 - 100 0 ng/mL   TSH, 3rd generation   Result Value Ref Range    TSH 3RD GENERATON 1 450 0 358 - 3 740 uIU/mL       No orders of the defined types were placed in this encounter  ALLERGIES:  No Known Allergies    Current Medications     Current Outpatient Prescriptions   Medication Sig Dispense Refill    Butalbital-APAP-Caffeine (FIORICET) -40 MG CAPS Take 1 tablet by mouth every 6 (six) hours as needed (as needed) 90 capsule 0    ethynodiol-ethinyl estradiol (ZOVIA 1/35E, 28,) 1-35 MG-MCG per tablet Take by mouth      topiramate (TOPAMAX) 25 mg sprinkle capsule 1 tab for 5 nights, 2 tabs for 5 nights, 3 tabs for 5 nights and finally 4 tabs 100 capsule 0    propranolol (INDERAL LA) 80 mg 24 hr capsule Take 1 capsule (80 mg total) by mouth daily 90 capsule 1     No current facility-administered medications for this visit            Health Maintenance     Health Maintenance   Topic Date Due    HIV SCREENING  1990    Depression Screening PHQ-9  04/11/2002    PAP SMEAR  04/11/2008    DTaP,Tdap,and Td Vaccines (2 - Tdap) 07/15/2008    INFLUENZA VACCINE  09/01/2017     Immunization History   Administered Date(s) Administered    DTaP 5 1990, 1990, 1990, 01/08/1992, 04/12/1995    Hep B / HiB 10/14/1992, 11/11/1992, 05/26/1993    IPV 1990, 1990, 1990, 01/08/1992    MMR 07/03/1991, 06/22/1998    Td (adult), adsorbed 06/17/2008    Tuberculin Skin Test-PPD Intradermal 54/03/9413       Buffy Camarillo MD

## 2018-02-16 NOTE — ASSESSMENT & PLAN NOTE
Headaches  Winter long discussion with the patient regarding her symptoms and treatment options  We will increase propranolol to 80 milligrams daily for prophylaxis  She was also given a refill on her Fioricet for episodic headaches  If symptoms persist we will consider SSRI treatment for prophylaxis    Patient wished to hold off on medications such as Topamax

## 2018-02-19 ENCOUNTER — TELEPHONE (OUTPATIENT)
Dept: FAMILY MEDICINE CLINIC | Facility: CLINIC | Age: 28
End: 2018-02-19

## 2018-02-19 DIAGNOSIS — G43.911 INTRACTABLE MIGRAINE WITH STATUS MIGRAINOSUS, UNSPECIFIED MIGRAINE TYPE: Primary | ICD-10-CM

## 2018-02-19 RX ORDER — BUTALBITAL, ACETAMINOPHEN AND CAFFEINE 50; 325; 40 MG/1; MG/1; MG/1
1 TABLET ORAL EVERY 4 HOURS PRN
Qty: 30 TABLET | Refills: 1 | Status: SHIPPED | OUTPATIENT
Start: 2018-02-19 | End: 2019-12-10

## 2018-02-19 NOTE — TELEPHONE ENCOUNTER
RE: Fioricet  It is written as [phone]  If you can resend it as   [phone] I think it will go thru on her plan  Please call if any questions

## 2018-02-19 NOTE — TELEPHONE ENCOUNTER
Just an FYI    Pt called back and stated she did not start topamax as she was informed by pharmacist that it would interere w/ her birth control and cause breakthrough bleeding  Her PCP Dr Kevin Garcia will be increasing her propranolol

## 2018-04-05 ENCOUNTER — OFFICE VISIT (OUTPATIENT)
Dept: NEUROLOGY | Facility: CLINIC | Age: 28
End: 2018-04-05
Payer: COMMERCIAL

## 2018-04-05 VITALS
DIASTOLIC BLOOD PRESSURE: 57 MMHG | SYSTOLIC BLOOD PRESSURE: 109 MMHG | BODY MASS INDEX: 16.66 KG/M2 | HEIGHT: 65 IN | WEIGHT: 100 LBS | HEART RATE: 62 BPM

## 2018-04-05 DIAGNOSIS — G43.709 CHRONIC MIGRAINE WITHOUT AURA WITHOUT STATUS MIGRAINOSUS, NOT INTRACTABLE: Primary | ICD-10-CM

## 2018-04-05 PROBLEM — R51.9 GENERALIZED HEADACHES: Status: RESOLVED | Noted: 2018-02-15 | Resolved: 2018-04-05

## 2018-04-05 PROCEDURE — 99214 OFFICE O/P EST MOD 30 MIN: CPT | Performed by: PSYCHIATRY & NEUROLOGY

## 2018-04-05 RX ORDER — CYPROHEPTADINE HYDROCHLORIDE 4 MG/1
TABLET ORAL
Qty: 30 TABLET | Refills: 2 | Status: SHIPPED | OUTPATIENT
Start: 2018-04-05 | End: 2018-10-02 | Stop reason: SDUPTHER

## 2018-04-05 NOTE — PATIENT INSTRUCTIONS
Preventive therapy for headaches:  -  She is to continue taking Inderal 80 mg once a day  -    Will add cyproheptadine 4 mg half tab at bedtime daily  If she feels lethargic the next day she is to have her cup tea to see if that helps improve her symptoms if not she may stop the cyproheptadine and taken only as needed on the weekends  -   If cyproheptadine fails in decreasing the intensity and frequency of headaches consider adding protriptyline  -    If she can I recommend she add magnesium 200 mg and B2 200 mg in the morning to see if that helps decrease the intensity and frequency of her headaches as well  Abortive therapy for headaches:  -  At the onset of the headaches she has to take Aleve and if that fails she may take Fioricet  Less than 3 times a week  She is to call if she has a status migraine a migraine lasting more than 24 hours for which we can call in [facility]

## 2018-04-05 NOTE — PROGRESS NOTES
Patient ID: Brisa Jenkins is a 32 y o  female  Assessment/Plan:   Problem List Items Addressed This Visit        Cardiovascular and Mediastinum    Chronic migraine without aura without status migrainosus, not intractable - Primary    Relevant Medications    cyproheptadine (PERIACTIN) 4 mg tablet         Preventive therapy for headaches:  -  She is to continue taking Inderal 80 mg once a day  -    Will add cyproheptadine 4 mg half tab at bedtime daily  If she feels lethargic the next day she is to have her cup tea to see if that helps improve her symptoms if not she may stop the cyproheptadine and taken only as needed on the weekends  -   If cyproheptadine fails in decreasing the intensity and frequency of headaches consider adding protriptyline  -    If she can I recommend she add magnesium 200 mg and B2 200 mg in the morning to see if that helps decrease the intensity and frequency of her headaches as well  Abortive therapy for headaches:  -  At the onset of the headaches she has to take Aleve and if that fails she may take Fioricet  Less than 3 times a week  She is to call if she has a status migraine a migraine lasting more than 24 hours for which we can call in Decadron  Subjective:  HPI  We had the pleasure of evaluating Jahaira Reyes in neurological follow up today  As you know she is a 32year old right handed female  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  Right arm/hand pain:   - This started at age 24 and she has not had an EMG done  She has pain radiating up his arm to his shoulder         Headaches:   PREVENTIVE: propranolol, venlafaxine (mental fogginess), Neurontin (hallucination and suicidal ideation)  ABORTIVE: Relpax, Fioricet,     Headache trigger: Fatigue, Stress/Tension, Weather change, Menstruation, Alcohol, Coughing, lack of sleep, smells, crying, loud noises, sun light, cold weather  Aura- lacrimation, blurry vision, scatoma which floaters but these do not happen often    She states she is in a lot of stress at this time  She is looking for a job now as she is still a   She states she is waking up 4:30 am to get to work  She is not sleeping at night  She is very anxious about her job and is having relation ship issues as well  If she is talking to her partner about this it helps when she does not things get worse  How often do the headaches occur -   2018 - [place]: 17 (end of the month she had 16 h/a back to back), Feb: 14 headaches, [place] - 16   2017: January- 16, February- 23, March- 20, April -13, may - 22, June - 9, July 9, Aug  15, September 14, October 10, November - 14, December - 16  What time of the day do the headaches start - anytime of the day   How long do the headaches last - few hours to all day - if it starts in the morning then it will last all day  Where are they located - temporal : tight band, left supra orbital, occipital sometimes  What is the intensity of pain - average 3/10 and recently it has been up to 6-7/10  Describe your usual headache - Nagging, Aching, Dull, Tight band  Associated symptoms:   - Decrease of appetite, nausea (almost always)  - Photophobia, phonophobia, sensitivity to smell   - Problem with concentration  - Change in pupil size (small)  - Lacrimation  - Tinnitus, light-headed or dizzy, stiff or sore neck,   - prefer to be alone and in a dark room, unable to work       The following portions of the patient's history were reviewed and updated as appropriate: allergies, current medications, past family history, past medical history, past social history, past surgical history and problem list          Objective:    Blood pressure 109/57, pulse 62, height 5' 5" (1 651 m), weight 45 4 kg (100 lb)  Physical Exam   Constitutional: She appears well-developed  Eyes: EOM are normal  Pupils are equal, round, and reactive to light  Neck: Normal range of motion  Neck supple  Cardiovascular: Normal rate  Pulmonary/Chest: Effort normal    Abdominal: Soft  Musculoskeletal: Normal range of motion  Neurological: She has normal strength and normal reflexes  Gait and coordination normal    Skin: Skin is warm  Psychiatric: She has a normal mood and affect  Her speech is normal        Neurological Exam    Mental Status  The patient is alert  Her speech is normal  Her language is fluent with no aphasia  She has normal attention span and concentration  Cranial Nerves    CN II: The patient's visual acuity and visual fields are normal   CN III, IV, VI: The patient's pupils are equally round and reactive to light and ocular movements are normal   CN V: The patient has normal facial sensation  CN VII:  The patient has symmetric facial movement  CN VIII:  The patient's hearing is normal   CN IX, X: The patient has symmetric palate movement and normal gag reflex  CN XI: The patient's shoulder shrug strength is normal   CN XII: The patient's tongue is midline without atrophy or fasciculations  Motor  The patient has normal muscle bulk throughout  Her overall muscle tone is normal throughout  Her strength is 5/5 throughout all four extremities  Sensory  The patient's sensation is normal in all four extremities  Reflexes  Deep tendon reflexes are 2+ and symmetric in all four extremities with downgoing toes bilaterally  Gait and Coordination  The patient has normal gait and station and normal casual, toe, heel, and tandem gait  She has normal coordination bilaterally  ROS:    Review of Systems   Constitutional: Negative  HENT: Negative  Eyes: Negative  Respiratory: Negative  Cardiovascular: Negative  Gastrointestinal: Negative  Endocrine: Negative  Genitourinary: Negative  Musculoskeletal: Negative  Skin: Negative  Allergic/Immunologic: Negative  Neurological: Positive for headaches  Hematological: Negative      Psychiatric/Behavioral: Positive for sleep disturbance  The patient is nervous/anxious

## 2018-05-25 ENCOUNTER — TELEPHONE (OUTPATIENT)
Dept: NEUROLOGY | Facility: CLINIC | Age: 28
End: 2018-05-25

## 2018-05-25 DIAGNOSIS — G43.709 CHRONIC MIGRAINE WITHOUT AURA WITHOUT STATUS MIGRAINOSUS, NOT INTRACTABLE: Primary | ICD-10-CM

## 2018-05-25 RX ORDER — DEXAMETHASONE 2 MG/1
2 TABLET ORAL
Qty: 5 TABLET | Refills: 0 | Status: SHIPPED | OUTPATIENT
Start: 2018-05-25 | End: 2019-10-14 | Stop reason: ALTCHOICE

## 2018-05-25 NOTE — TELEPHONE ENCOUNTER
pt called and states that today is day 3 with a migraine  per your last note, she was to call if she had a migraine lasting more than 24 hours taht we could call in decadron  took fioricet this am   currently 3-4/10  before fioricet was 7/10   took fioricet 1 tab daily for the last 3 days  +nausea, +light/sound sensitivity  she states that one of her dr's told her that she should only take tylenol due to a bleeding issue and issues with her stomach      cyproheptadine 4mg 1 2 tab hs  inderal 80mg daily hs  she did not start mag or b2 as she had yellow drainage from her nose when she was on b2 inthe past     She is requesting a script for decadron  [phone] until 2:45 pm  [phone]

## 2018-07-12 NOTE — PROGRESS NOTES
Patient ID: Mario Ramsay is a 29 y o  female  Assessment/Plan:    Chronic migraine without aura without status migrainosus, not intractable  Preventive therapy for headaches:    She is to continue taking Inderal 80 mg once a day  Cyproheptadine 4 mg half tab at bedtime daily  Magnesium 250 mg   If no improvement after what was discussed, then we need to add a preventative medication  We discussed protriptyline, Lamictal or Zonisamide  ANd we discussed Aimovig once a month injection    Abortive therapy for headaches: At the onset of the headaches take Rizatriptan  May repeat in 2 hours if still have a migraine  Limit of 3/week or 9/month  If that fails she may take Fioricet  Less than 3 times a week  She is to call if she has a status migraine a migraine lasting more than 24 hours, use Decadron  Problem List Items Addressed This Visit        Cardiovascular and Mediastinum    Chronic migraine without aura without status migrainosus, not intractable - Primary     Preventive therapy for headaches:    She is to continue taking Inderal 80 mg once a day  Cyproheptadine 4 mg half tab at bedtime daily  Magnesium 250 mg   If no improvement after what was discussed, then we need to add a preventative medication  We discussed protriptyline, Lamictal or Zonisamide  ANd we discussed Aimovig once a month injection    Abortive therapy for headaches: At the onset of the headaches take Rizatriptan  May repeat in 2 hours if still have a migraine  Limit of 3/week or 9/month  If that fails she may take Fioricet  Less than 3 times a week  She is to call if she has a status migraine a migraine lasting more than 24 hours, use Decadron  Relevant Medications    rizatriptan (MAXALT) 10 MG tablet             Subjective:    HPI  We had the pleasure of evaluating Haydee Carr in neurological follow up today  As you know she is a 32year old right handed female   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       Right arm/hand pain:   -       This started at age 24 and she has not had an EMG done  She has pain radiating up his arm to his shoulder          Headaches:   PREVENTIVE: propranolol, venlafaxine (mental fogginess/nose bleeds), Neurontin (hallucination and suicidal ideation), cyproheptadine, Magnesium, B2  ABORTIVE: Relpax, Fioricet, decadron,  rizatriptan     Headache trigger: Fatigue, Stress/Tension, Weather change, Menstruation, Alcohol, Coughing, lack of sleep, smells, crying, loud noises, sun light, cold weather, shopping, not eating  Aura- lacrimation, blurry vision, scatoma which floaters but these do not happen often     She states she is in a lot of stress at this time  She is looking for a job now as she is still a   She states she is waking up 4:30 am to get to work  She is not sleeping at night  She is very anxious about her job and is having relationship issues as well  If she is talking to her partner about this it helps when she does not things get worse       How often do the headaches occur -   2018 - [place]: 17 (end of the month she had 16 h/a back to back), Feb: 14 headaches, [place] - 16 , April-9, May-10, June-13, July-5  2017: January- 16, February- 23, March- 20, April -13, may - 22, June - 9, July 9, Aug  15, September 14, October 10, November - 14, December - 16     What time of the day do the headaches start - anytime of the day   How long do the headaches last - few hours to all day - if it starts in the morning then it will last all day  Where are they located - temporal : tight band, left supra orbital, occipital sometimes  What is the intensity of pain - average 3/10 and recently it has been up to 6-7/10  Describe your usual headache - Nagging, Aching, Dull, Tight band  Associated symptoms:   -       Decrease of appetite, nausea (almost always)  -       Photophobia, phonophobia, sensitivity to smell   -       Problem with concentration, Change in mood, irritable  -       Change in pupil size (small)  -       Lacrimation  -       Tinnitus, light-headed or dizzy, stiff or sore neck,   -       prefer to be alone and in a dark room, unable to work                  The following portions of the patient's history were reviewed and updated as appropriate: allergies, current medications, past family history, past medical history, past social history, past surgical history and problem list          Objective:    Blood pressure 90/64, pulse 60, height 5' 5" (1 651 m), weight 49 4 kg (108 lb 12 8 oz)  Physical Exam   Constitutional: She appears well-developed and well-nourished  HENT:   Head: Normocephalic and atraumatic  Eyes: EOM are normal  Pupils are equal, round, and reactive to light  Neck: Normal range of motion  Cardiovascular: Normal rate  Pulmonary/Chest: Effort normal    Musculoskeletal: Normal range of motion  Neurological: She has normal strength and normal reflexes  Gait and coordination normal    Skin: Skin is warm and dry  Psychiatric: She has a normal mood and affect  Her speech is normal    Nursing note and vitals reviewed  Neurological Exam    Mental Status  The patient is alert and oriented to person, place, time, and situation  Her recent and remote memory are normal  She has no visuospatial neglect  Her speech is normal  Her language is fluent with no aphasia  She has normal attention span and concentration  She has a normal fund of knowledge  Cranial Nerves    CN II: The patient's visual acuity and visual fields are normal   CN III, IV, VI: The patient's pupils are equally round and reactive to light and ocular movements are normal   CN V: The patient has normal facial sensation  CN VII:  The patient has symmetric facial movement  CN VIII:  The patient's hearing is normal   CN IX, X: The patient has symmetric palate movement and normal gag reflex    CN XI: The patient's shoulder shrug strength is normal   CN XII: The patient's tongue is midline without atrophy or fasciculations  Motor  The patient has normal muscle bulk throughout  Her overall muscle tone is normal throughout  Her strength is 5/5 throughout all four extremities  Sensory  The patient's sensation is normal in all four extremities  She has normal cortical sensation    Reflexes  Deep tendon reflexes are 2+ and symmetric in all four extremities with downgoing toes bilaterally  Gait and Coordination  The patient has normal gait and station and normal casual, toe, heel, and tandem gait  She has normal coordination bilaterally  ROS:    Review of Systems   Constitutional: Positive for fatigue  HENT: Negative  Eyes: Negative  Respiratory: Negative  Cardiovascular: Negative  Gastrointestinal: Negative  Endocrine: Negative  Genitourinary: Negative  Musculoskeletal: Negative  Skin: Negative  Allergic/Immunologic: Negative  Neurological: Negative  Hematological: Negative  Psychiatric/Behavioral: The patient is nervous/anxious

## 2018-07-17 ENCOUNTER — OFFICE VISIT (OUTPATIENT)
Dept: NEUROLOGY | Facility: CLINIC | Age: 28
End: 2018-07-17
Payer: COMMERCIAL

## 2018-07-17 VITALS
WEIGHT: 108.8 LBS | HEIGHT: 65 IN | DIASTOLIC BLOOD PRESSURE: 64 MMHG | SYSTOLIC BLOOD PRESSURE: 90 MMHG | HEART RATE: 60 BPM | BODY MASS INDEX: 18.13 KG/M2

## 2018-07-17 DIAGNOSIS — G43.709 CHRONIC MIGRAINE WITHOUT AURA WITHOUT STATUS MIGRAINOSUS, NOT INTRACTABLE: Primary | ICD-10-CM

## 2018-07-17 PROCEDURE — 99214 OFFICE O/P EST MOD 30 MIN: CPT | Performed by: PHYSICIAN ASSISTANT

## 2018-07-17 RX ORDER — MULTIVITAMIN WITH IRON
250 TABLET ORAL DAILY
COMMUNITY

## 2018-07-17 RX ORDER — RIZATRIPTAN BENZOATE 10 MG/1
10 TABLET ORAL ONCE AS NEEDED
Qty: 9 TABLET | Refills: 0 | Status: SHIPPED | OUTPATIENT
Start: 2018-07-17 | End: 2018-08-29

## 2018-07-17 NOTE — PATIENT INSTRUCTIONS
Preventive therapy for headaches:    She is to continue taking Inderal 80 mg once a day  Cyproheptadine 4 mg half tab at bedtime daily  Magnesium 250 mg   If no improvement after what was discussed, then we need to add a preventative medication  We discussed protriptyline, Lamictal or Zonisamide  ANd we discussed Aimovig once a month injection    Abortive therapy for headaches: At the onset of the headaches take Rizatriptan  May repeat in 2 hours if still have a migraine  Limit of 3/week or 9/month  If that fails she may take Fioricet  Less than 3 times a week  She is to call if she has a status migraine a migraine lasting more than 24 hours, use Decadron  May take these over-the-counter supplements to decrease intensity and frequency of migraines  - Magnesium Oxide 400-500 mg a day  If any diarrhea or upset stomach, decrease dose  as tolerated  -  B2 200 mg a day  May take once a day in am  This supplement will change the color of the urine to fluorescent yellow no matter how hydrated, which is normal      Discussed side effects of all medications prescribed today to the patient in detail  Patient education was completed today and we also discussed precautions for rebound headaches  When patient has a moderate to severe headache, they should seek rest, initiate relaxation and apply cold compresses to the head  1  Maintain regular sleep schedule  Adults need at least 7-8 hours of uninterrupted a night  2  Limit over the counter medications such as Tylenol, Ibuprofen, Aleve, Excedrin  (No more than 3 times a week)  3  Maintain headache diary  We discussed an ALDO for a smart phone is "Migraine eDiary"  4  Limit caffeine to 1-2 cups a day or less  5  Avoid dietary trigger  (list given to the patient and reviewed with them)  6  Patient is to have regular frequent meals to prevent headache onset  7   Please drink at least 64 ounces of water a day to help remain hydrated

## 2018-08-29 ENCOUNTER — HOSPITAL ENCOUNTER (EMERGENCY)
Facility: HOSPITAL | Age: 28
Discharge: HOME | End: 2018-08-29
Attending: EMERGENCY MEDICINE
Payer: COMMERCIAL

## 2018-08-29 ENCOUNTER — APPOINTMENT (EMERGENCY)
Dept: RADIOLOGY | Facility: HOSPITAL | Age: 28
End: 2018-08-29
Payer: COMMERCIAL

## 2018-08-29 VITALS
HEART RATE: 86 BPM | SYSTOLIC BLOOD PRESSURE: 121 MMHG | DIASTOLIC BLOOD PRESSURE: 55 MMHG | BODY MASS INDEX: 18.78 KG/M2 | OXYGEN SATURATION: 99 % | WEIGHT: 110 LBS | RESPIRATION RATE: 16 BRPM | HEIGHT: 64 IN
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
## 2018-08-29 DIAGNOSIS — M54.2 NECK PAIN: Primary | ICD-10-CM

## 2018-08-29 PROCEDURE — 99284 EMERGENCY DEPT VISIT MOD MDM: CPT

## 2018-08-29 PROCEDURE — 72125 CT NECK SPINE W/O DYE: CPT

## 2018-08-29 RX ORDER — ACETAMINOPHEN 325 MG/1
650 TABLET ORAL ONCE
Status: COMPLETED | OUTPATIENT
Start: 2018-08-29 | End: 2018-08-29

## 2018-08-29 RX ADMIN — ACETAMINOPHEN 650 MG: 325 TABLET, FILM COATED ORAL at 18:29
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
## 2018-08-31 ENCOUNTER — OFFICE VISIT (OUTPATIENT)
Dept: FAMILY MEDICINE CLINIC | Facility: CLINIC | Age: 28
End: 2018-08-31
Payer: COMMERCIAL

## 2018-08-31 VITALS
TEMPERATURE: 98.7 F | DIASTOLIC BLOOD PRESSURE: 70 MMHG | HEIGHT: 64 IN | HEART RATE: 62 BPM | WEIGHT: 110.4 LBS | BODY MASS INDEX: 18.85 KG/M2 | RESPIRATION RATE: 16 BRPM | SYSTOLIC BLOOD PRESSURE: 100 MMHG

## 2018-08-31 DIAGNOSIS — S16.1XXA STRAIN OF NECK MUSCLE, INITIAL ENCOUNTER: Primary | ICD-10-CM

## 2018-08-31 PROCEDURE — 99214 OFFICE O/P EST MOD 30 MIN: CPT | Performed by: FAMILY MEDICINE

## 2018-08-31 NOTE — ASSESSMENT & PLAN NOTE
Neck strain  Patient will continue with ice and heat treatments to the affected areas of her neck  She may take over-the-counter Tylenol or ibuprofen for discomfort  She is aware symptoms may take several days to weeks to improve  She will call if she has any further questions    Overall she appears to be stable at this time from a neurologic and orthopedic standpoint
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
## 2018-09-04 DIAGNOSIS — R51.9 GENERALIZED HEADACHES: ICD-10-CM

## 2018-09-04 RX ORDER — PROPRANOLOL HYDROCHLORIDE 80 MG/1
CAPSULE, EXTENDED RELEASE ORAL
Qty: 90 CAPSULE | Refills: 1 | Status: SHIPPED | OUTPATIENT
Start: 2018-09-04 | End: 2019-02-20 | Stop reason: SDUPTHER

## 2018-10-02 DIAGNOSIS — G43.709 CHRONIC MIGRAINE WITHOUT AURA WITHOUT STATUS MIGRAINOSUS, NOT INTRACTABLE: ICD-10-CM

## 2018-10-02 RX ORDER — CYPROHEPTADINE HYDROCHLORIDE 4 MG/1
TABLET ORAL
Qty: 30 TABLET | Refills: 2 | Status: SHIPPED | OUTPATIENT
Start: 2018-10-02 | End: 2019-03-01 | Stop reason: SDUPTHER
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
## 2018-10-17 ENCOUNTER — OFFICE VISIT (OUTPATIENT)
Dept: NEUROLOGY | Facility: CLINIC | Age: 28
End: 2018-10-17
Payer: COMMERCIAL

## 2018-10-17 VITALS
SYSTOLIC BLOOD PRESSURE: 92 MMHG | WEIGHT: 110.7 LBS | DIASTOLIC BLOOD PRESSURE: 64 MMHG | BODY MASS INDEX: 18.9 KG/M2 | HEIGHT: 64 IN | HEART RATE: 56 BPM

## 2018-10-17 DIAGNOSIS — G43.709 CHRONIC MIGRAINE WITHOUT AURA WITHOUT STATUS MIGRAINOSUS, NOT INTRACTABLE: Primary | ICD-10-CM

## 2018-10-17 PROCEDURE — 99214 OFFICE O/P EST MOD 30 MIN: CPT | Performed by: PHYSICIAN ASSISTANT

## 2018-10-17 RX ORDER — PROCHLORPERAZINE MALEATE 10 MG
10 TABLET ORAL EVERY 6 HOURS PRN
Qty: 10 TABLET | Refills: 0 | Status: SHIPPED | OUTPATIENT
Start: 2018-10-17 | End: 2019-12-10

## 2018-10-17 NOTE — PATIENT INSTRUCTIONS
Preventive therapy for headaches:    She is to continue taking Inderal 80 mg once a day  Cyproheptadine 4 mg at bedtime daily  Magnesium 250 mg   If no improvement after what was discussed, then we need to add a preventative medication  We discussed protriptyline, Lamictal or Zonisamide  And we discussed Aimovig/Ajovy once a month injection    Abortive therapy for headaches: At the onset of the headaches take Prochlorperazine  May repeat in 6-8 hours if still have a migraine  Limit of 10 a month  If that fails she may take Fioricet  Less than 3 times a week  She is to call if she has a status migraine a migraine lasting more than 24 hours, use Decadron  May take these over-the-counter supplements to decrease intensity and frequency of migraines  - Magnesium Oxide 400-500 mg a day  If any diarrhea or upset stomach, decrease dose  as tolerated    Discussed side effects of all medications prescribed today to the patient in detail  Patient education was completed today and we also discussed precautions for rebound headaches  When patient has a moderate to severe headache, they should seek rest, initiate relaxation and apply cold compresses to the head  1  Maintain regular sleep schedule  Adults need at least 7-8 hours of uninterrupted a night  2  Limit over the counter medications such as Tylenol, Ibuprofen, Aleve, Excedrin  (No more than 3 times a week)  3  Maintain headache diary  We discussed an ALDO for a smart phone is "Migraine eDiary"  4  Limit caffeine to 1-2 cups a day or less  5  Avoid dietary trigger  (list given to the patient and reviewed with them)  6  Patient is to have regular frequent meals to prevent headache onset  7   Please drink at least 64 ounces of water a day to help remain hydrated

## 2018-10-17 NOTE — ASSESSMENT & PLAN NOTE
Preventive therapy for headaches:    She is to continue taking Inderal 80 mg once a day  Cyproheptadine 4 mg at bedtime daily  Magnesium 250 mg   If no improvement after what was discussed, then we need to add a preventative medication  We discussed protriptyline, Lamictal or Zonisamide  And we discussed Aimovig/Ajovy once a month injection  We did discuss this class of medications would have the least amount of side effects    Abortive therapy for headaches: At the onset of the headaches take Prochlorperazine  May repeat in 6-8 hours if still have a migraine  Limit of 10 a month  If that fails she may take Fioricet  Less than 3 times a week  She is to call if she has a status migraine a migraine lasting more than 24 hours, use Decadron  Patient is refusing any additional preventative medications at this time  We did discuss that her headaches would like worsen over time if not working to prevent  Did recommend that patient talk to her counselor about adding additional medications  Patient will follow up in 1 year    However, if she wishes to begin additional medications, she will call us

## 2019-02-20 DIAGNOSIS — R51.9 GENERALIZED HEADACHES: ICD-10-CM

## 2019-02-20 RX ORDER — PROPRANOLOL HYDROCHLORIDE 80 MG/1
CAPSULE, EXTENDED RELEASE ORAL
Qty: 90 CAPSULE | Refills: 1 | Status: SHIPPED | OUTPATIENT
Start: 2019-02-20 | End: 2019-08-18 | Stop reason: SDUPTHER

## 2019-03-01 DIAGNOSIS — G43.709 CHRONIC MIGRAINE WITHOUT AURA WITHOUT STATUS MIGRAINOSUS, NOT INTRACTABLE: ICD-10-CM

## 2019-03-01 RX ORDER — CYPROHEPTADINE HYDROCHLORIDE 4 MG/1
TABLET ORAL
Qty: 30 TABLET | Refills: 2 | Status: SHIPPED | OUTPATIENT
Start: 2019-03-01 | End: 2019-10-08 | Stop reason: SDUPTHER

## 2019-04-01 ENCOUNTER — OFFICE VISIT (OUTPATIENT)
Dept: FAMILY MEDICINE CLINIC | Facility: CLINIC | Age: 29
End: 2019-04-01
Payer: COMMERCIAL

## 2019-04-01 VITALS
SYSTOLIC BLOOD PRESSURE: 90 MMHG | TEMPERATURE: 98.5 F | WEIGHT: 113.8 LBS | HEIGHT: 65 IN | DIASTOLIC BLOOD PRESSURE: 50 MMHG | BODY MASS INDEX: 18.96 KG/M2 | HEART RATE: 80 BPM | RESPIRATION RATE: 16 BRPM

## 2019-04-01 DIAGNOSIS — Z00.00 PHYSICAL EXAM: Primary | ICD-10-CM

## 2019-04-01 LAB
SL AMB  POCT GLUCOSE, UA: NORMAL
SL AMB LEUKOCYTE ESTERASE,UA: NORMAL
SL AMB POCT BILIRUBIN,UA: NORMAL
SL AMB POCT BLOOD,UA: NORMAL
SL AMB POCT CLARITY,UA: CLEAR
SL AMB POCT COLOR,UA: YELLOW
SL AMB POCT KETONES,UA: NORMAL
SL AMB POCT NITRITE,UA: NORMAL
SL AMB POCT PH,UA: 5
SL AMB POCT SPECIFIC GRAVITY,UA: 1.01
SL AMB POCT URINE PROTEIN: NORMAL
SL AMB POCT UROBILINOGEN: 0.2

## 2019-04-01 PROCEDURE — 86580 TB INTRADERMAL TEST: CPT

## 2019-04-01 PROCEDURE — 81003 URINALYSIS AUTO W/O SCOPE: CPT | Performed by: FAMILY MEDICINE

## 2019-04-01 PROCEDURE — 99395 PREV VISIT EST AGE 18-39: CPT | Performed by: FAMILY MEDICINE

## 2019-04-03 LAB
INDURATION: 0 MM
TB SKIN TEST: NEGATIVE

## 2019-05-05 DIAGNOSIS — G43.709 CHRONIC MIGRAINE WITHOUT AURA WITHOUT STATUS MIGRAINOSUS, NOT INTRACTABLE: ICD-10-CM

## 2019-05-08 RX ORDER — CYPROHEPTADINE HYDROCHLORIDE 4 MG/1
TABLET ORAL
Qty: 30 TABLET | Refills: 0 | OUTPATIENT
Start: 2019-05-08

## 2019-06-29 ENCOUNTER — HOSPITAL ENCOUNTER (EMERGENCY)
Facility: HOSPITAL | Age: 29
Discharge: HOME | End: 2019-06-29
Attending: EMERGENCY MEDICINE | Admitting: EMERGENCY MEDICINE
Payer: COMMERCIAL

## 2019-06-29 VITALS
OXYGEN SATURATION: 100 % | WEIGHT: 112 LBS | SYSTOLIC BLOOD PRESSURE: 100 MMHG | HEIGHT: 65 IN | HEART RATE: 59 BPM | TEMPERATURE: 98.3 F | BODY MASS INDEX: 18.66 KG/M2 | DIASTOLIC BLOOD PRESSURE: 48 MMHG | RESPIRATION RATE: 18 BRPM

## 2019-06-29 DIAGNOSIS — R10.9 ABDOMINAL PAIN: Primary | ICD-10-CM

## 2019-06-29 LAB
ALBUMIN SERPL BCP-MCNC: 3.8 G/DL (ref 3.5–5)
ALP SERPL-CCNC: 65 U/L (ref 46–116)
ALT SERPL W P-5'-P-CCNC: 26 U/L (ref 12–78)
ANION GAP SERPL CALCULATED.3IONS-SCNC: 7 MMOL/L (ref 4–13)
AST SERPL W P-5'-P-CCNC: 18 U/L (ref 5–45)
BASOPHILS # BLD AUTO: 0.07 THOUSANDS/ΜL (ref 0–0.1)
BASOPHILS NFR BLD AUTO: 1 % (ref 0–1)
BILIRUB SERPL-MCNC: 0.3 MG/DL (ref 0.2–1)
BILIRUB UR QL STRIP: NEGATIVE
BUN SERPL-MCNC: 16 MG/DL (ref 5–25)
CALCIUM SERPL-MCNC: 9 MG/DL (ref 8.3–10.1)
CHLORIDE SERPL-SCNC: 106 MMOL/L (ref 100–108)
CLARITY UR: CLEAR
CO2 SERPL-SCNC: 28 MMOL/L (ref 21–32)
COLOR UR: YELLOW
COLOR, POC: YELLOW
CREAT SERPL-MCNC: 0.72 MG/DL (ref 0.6–1.3)
EOSINOPHIL # BLD AUTO: 0.14 THOUSAND/ΜL (ref 0–0.61)
EOSINOPHIL NFR BLD AUTO: 1 % (ref 0–6)
ERYTHROCYTE [DISTWIDTH] IN BLOOD BY AUTOMATED COUNT: 13 % (ref 11.6–15.1)
EXT PREG TEST URINE: NEGATIVE
EXT. CONTROL ED NAV: NORMAL
GFR SERPL CREATININE-BSD FRML MDRD: 114 ML/MIN/1.73SQ M
GLUCOSE SERPL-MCNC: 85 MG/DL (ref 65–140)
GLUCOSE UR STRIP-MCNC: NEGATIVE MG/DL
HCT VFR BLD AUTO: 38.2 % (ref 34.8–46.1)
HGB BLD-MCNC: 12.3 G/DL (ref 11.5–15.4)
HGB UR QL STRIP.AUTO: NEGATIVE
IMM GRANULOCYTES # BLD AUTO: 0.05 THOUSAND/UL (ref 0–0.2)
IMM GRANULOCYTES NFR BLD AUTO: 0 % (ref 0–2)
KETONES UR STRIP-MCNC: NEGATIVE MG/DL
LEUKOCYTE ESTERASE UR QL STRIP: NEGATIVE
LIPASE SERPL-CCNC: 159 U/L (ref 73–393)
LYMPHOCYTES # BLD AUTO: 3.05 THOUSANDS/ΜL (ref 0.6–4.47)
LYMPHOCYTES NFR BLD AUTO: 25 % (ref 14–44)
MCH RBC QN AUTO: 28.9 PG (ref 26.8–34.3)
MCHC RBC AUTO-ENTMCNC: 32.2 G/DL (ref 31.4–37.4)
MCV RBC AUTO: 90 FL (ref 82–98)
MONOCYTES # BLD AUTO: 0.88 THOUSAND/ΜL (ref 0.17–1.22)
MONOCYTES NFR BLD AUTO: 7 % (ref 4–12)
NEUTROPHILS # BLD AUTO: 8.24 THOUSANDS/ΜL (ref 1.85–7.62)
NEUTS SEG NFR BLD AUTO: 66 % (ref 43–75)
NITRITE UR QL STRIP: NEGATIVE
NRBC BLD AUTO-RTO: 0 /100 WBCS
PH UR STRIP.AUTO: 6 [PH] (ref 4.5–8)
PLATELET # BLD AUTO: 172 THOUSANDS/UL (ref 149–390)
PMV BLD AUTO: 12.2 FL (ref 8.9–12.7)
POTASSIUM SERPL-SCNC: 3.8 MMOL/L (ref 3.5–5.3)
PROT SERPL-MCNC: 7.1 G/DL (ref 6.4–8.2)
PROT UR STRIP-MCNC: NEGATIVE MG/DL
RBC # BLD AUTO: 4.26 MILLION/UL (ref 3.81–5.12)
SODIUM SERPL-SCNC: 141 MMOL/L (ref 136–145)
SP GR UR STRIP.AUTO: 1.01 (ref 1–1.03)
UROBILINOGEN UR QL STRIP.AUTO: 0.2 E.U./DL
WBC # BLD AUTO: 12.43 THOUSAND/UL (ref 4.31–10.16)

## 2019-06-29 PROCEDURE — 99284 EMERGENCY DEPT VISIT MOD MDM: CPT

## 2019-06-29 PROCEDURE — 96374 THER/PROPH/DIAG INJ IV PUSH: CPT

## 2019-06-29 PROCEDURE — 99284 EMERGENCY DEPT VISIT MOD MDM: CPT | Performed by: EMERGENCY MEDICINE

## 2019-06-29 PROCEDURE — 36415 COLL VENOUS BLD VENIPUNCTURE: CPT | Performed by: EMERGENCY MEDICINE

## 2019-06-29 PROCEDURE — 85025 COMPLETE CBC W/AUTO DIFF WBC: CPT | Performed by: EMERGENCY MEDICINE

## 2019-06-29 PROCEDURE — C9113 INJ PANTOPRAZOLE SODIUM, VIA: HCPCS | Performed by: EMERGENCY MEDICINE

## 2019-06-29 PROCEDURE — 81025 URINE PREGNANCY TEST: CPT | Performed by: EMERGENCY MEDICINE

## 2019-06-29 PROCEDURE — 83690 ASSAY OF LIPASE: CPT | Performed by: EMERGENCY MEDICINE

## 2019-06-29 PROCEDURE — 80053 COMPREHEN METABOLIC PANEL: CPT | Performed by: EMERGENCY MEDICINE

## 2019-06-29 PROCEDURE — 81003 URINALYSIS AUTO W/O SCOPE: CPT

## 2019-06-29 RX ORDER — LIDOCAINE HYDROCHLORIDE 20 MG/ML
15 SOLUTION OROPHARYNGEAL ONCE
Status: COMPLETED | OUTPATIENT
Start: 2019-06-29 | End: 2019-06-29

## 2019-06-29 RX ORDER — DICYCLOMINE HCL 20 MG
20 TABLET ORAL ONCE
Status: COMPLETED | OUTPATIENT
Start: 2019-06-29 | End: 2019-06-29

## 2019-06-29 RX ORDER — SUCRALFATE ORAL 1 G/10ML
1000 SUSPENSION ORAL ONCE
Status: COMPLETED | OUTPATIENT
Start: 2019-06-29 | End: 2019-06-29

## 2019-06-29 RX ORDER — MAGNESIUM HYDROXIDE/ALUMINUM HYDROXICE/SIMETHICONE 120; 1200; 1200 MG/30ML; MG/30ML; MG/30ML
30 SUSPENSION ORAL ONCE
Status: COMPLETED | OUTPATIENT
Start: 2019-06-29 | End: 2019-06-29

## 2019-06-29 RX ORDER — PANTOPRAZOLE SODIUM 40 MG/1
40 INJECTION, POWDER, FOR SOLUTION INTRAVENOUS ONCE
Status: COMPLETED | OUTPATIENT
Start: 2019-06-29 | End: 2019-06-29

## 2019-06-29 RX ORDER — PANTOPRAZOLE SODIUM 20 MG/1
40 TABLET, DELAYED RELEASE ORAL DAILY
Qty: 20 TABLET | Refills: 0 | Status: SHIPPED | OUTPATIENT
Start: 2019-06-29 | End: 2019-08-27

## 2019-06-29 RX ORDER — MONTELUKAST SODIUM 10 MG/1
10 TABLET ORAL
COMMUNITY
End: 2021-03-17 | Stop reason: SINTOL

## 2019-06-29 RX ADMIN — LIDOCAINE HYDROCHLORIDE 15 ML: 20 SOLUTION ORAL; TOPICAL at 05:26

## 2019-06-29 RX ADMIN — PANTOPRAZOLE SODIUM 40 MG: 40 INJECTION, POWDER, FOR SOLUTION INTRAVENOUS at 05:28

## 2019-06-29 RX ADMIN — SUCRALFATE 1000 MG: 1 SUSPENSION ORAL at 05:25

## 2019-06-29 RX ADMIN — DICYCLOMINE HYDROCHLORIDE 20 MG: 20 TABLET ORAL at 03:58

## 2019-06-29 RX ADMIN — ALUMINUM HYDROXIDE, MAGNESIUM HYDROXIDE, AND SIMETHICONE 30 ML: 200; 200; 20 SUSPENSION ORAL at 05:26
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
## 2019-07-29 DIAGNOSIS — R10.9 ABDOMINAL PAIN: ICD-10-CM

## 2019-07-29 RX ORDER — PANTOPRAZOLE SODIUM 20 MG/1
TABLET, DELAYED RELEASE ORAL
Qty: 28 TABLET | Refills: 0 | OUTPATIENT
Start: 2019-07-29

## 2019-08-14 RX ORDER — ALBUTEROL SULFATE 90 UG/1
1-2 AEROSOL, METERED RESPIRATORY (INHALATION) EVERY 4 HOURS PRN
COMMUNITY
Start: 2019-06-17

## 2019-08-16 ENCOUNTER — OFFICE VISIT (OUTPATIENT)
Dept: FAMILY MEDICINE CLINIC | Facility: CLINIC | Age: 29
End: 2019-08-16
Payer: COMMERCIAL

## 2019-08-16 VITALS
OXYGEN SATURATION: 99 % | DIASTOLIC BLOOD PRESSURE: 68 MMHG | WEIGHT: 114.4 LBS | RESPIRATION RATE: 18 BRPM | HEART RATE: 79 BPM | SYSTOLIC BLOOD PRESSURE: 112 MMHG | TEMPERATURE: 98.6 F | BODY MASS INDEX: 19.06 KG/M2 | HEIGHT: 65 IN

## 2019-08-16 DIAGNOSIS — Z00.00 PERIODIC HEALTH ASSESSMENT, GENERAL SCREENING, ADULT: ICD-10-CM

## 2019-08-16 DIAGNOSIS — M25.50 ARTHRALGIA, UNSPECIFIED JOINT: Primary | ICD-10-CM

## 2019-08-16 PROCEDURE — 99395 PREV VISIT EST AGE 18-39: CPT | Performed by: FAMILY MEDICINE

## 2019-08-16 NOTE — ASSESSMENT & PLAN NOTE
Arthralgia  Patient will obtain blood work as ordered for further evaluation of her symptoms of a arthralgia  We discussed possibility of her symptoms being more mechanical in nature and not autoimmune  We may consider rheumatology consultation    We will make further recommendations pending results of test
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
## 2019-08-17 ENCOUNTER — TRANSCRIBE ORDERS (OUTPATIENT)
Dept: LAB | Facility: CLINIC | Age: 29
End: 2019-08-17

## 2019-08-17 ENCOUNTER — APPOINTMENT (OUTPATIENT)
Dept: LAB | Facility: CLINIC | Age: 29
End: 2019-08-17
Payer: COMMERCIAL

## 2019-08-17 DIAGNOSIS — M25.50 ARTHRALGIA, UNSPECIFIED JOINT: ICD-10-CM

## 2019-08-17 LAB
ALBUMIN SERPL BCP-MCNC: 4 G/DL (ref 3.5–5)
ALP SERPL-CCNC: 62 U/L (ref 46–116)
ALT SERPL W P-5'-P-CCNC: 28 U/L (ref 12–78)
ANION GAP SERPL CALCULATED.3IONS-SCNC: 6 MMOL/L (ref 4–13)
AST SERPL W P-5'-P-CCNC: 18 U/L (ref 5–45)
BILIRUB SERPL-MCNC: 0.55 MG/DL (ref 0.2–1)
BUN SERPL-MCNC: 11 MG/DL (ref 5–25)
CALCIUM SERPL-MCNC: 9.3 MG/DL (ref 8.3–10.1)
CHLORIDE SERPL-SCNC: 105 MMOL/L (ref 100–108)
CHOLEST SERPL-MCNC: 209 MG/DL (ref 50–200)
CO2 SERPL-SCNC: 28 MMOL/L (ref 21–32)
CREAT SERPL-MCNC: 0.82 MG/DL (ref 0.6–1.3)
ERYTHROCYTE [SEDIMENTATION RATE] IN BLOOD: 6 MM/HOUR (ref 0–20)
GFR SERPL CREATININE-BSD FRML MDRD: 97 ML/MIN/1.73SQ M
GLUCOSE P FAST SERPL-MCNC: 82 MG/DL (ref 65–99)
HDLC SERPL-MCNC: 68 MG/DL (ref 40–60)
LDLC SERPL CALC-MCNC: 117 MG/DL (ref 0–100)
NONHDLC SERPL-MCNC: 141 MG/DL
POTASSIUM SERPL-SCNC: 4.5 MMOL/L (ref 3.5–5.3)
PROT SERPL-MCNC: 7.9 G/DL (ref 6.4–8.2)
SODIUM SERPL-SCNC: 139 MMOL/L (ref 136–145)
TRIGL SERPL-MCNC: 122 MG/DL
TSH SERPL DL<=0.05 MIU/L-ACNC: 1.97 UIU/ML (ref 0.36–3.74)

## 2019-08-17 PROCEDURE — 85652 RBC SED RATE AUTOMATED: CPT

## 2019-08-17 PROCEDURE — 86038 ANTINUCLEAR ANTIBODIES: CPT

## 2019-08-17 PROCEDURE — 80061 LIPID PANEL: CPT

## 2019-08-17 PROCEDURE — 84443 ASSAY THYROID STIM HORMONE: CPT

## 2019-08-17 PROCEDURE — 80053 COMPREHEN METABOLIC PANEL: CPT

## 2019-08-17 PROCEDURE — 86618 LYME DISEASE ANTIBODY: CPT

## 2019-08-17 PROCEDURE — 86430 RHEUMATOID FACTOR TEST QUAL: CPT

## 2019-08-17 PROCEDURE — 36415 COLL VENOUS BLD VENIPUNCTURE: CPT

## 2019-08-18 DIAGNOSIS — R51.9 GENERALIZED HEADACHES: ICD-10-CM

## 2019-08-18 LAB — RHEUMATOID FACT SER QL LA: NEGATIVE

## 2019-08-19 LAB — RYE IGE QN: NEGATIVE

## 2019-08-19 RX ORDER — PROPRANOLOL HYDROCHLORIDE 80 MG/1
CAPSULE, EXTENDED RELEASE ORAL
Qty: 90 CAPSULE | Refills: 1 | Status: SHIPPED | OUTPATIENT
Start: 2019-08-19 | End: 2019-12-12 | Stop reason: SDUPTHER

## 2019-08-21 ENCOUNTER — HOSPITAL ENCOUNTER (EMERGENCY)
Facility: HOSPITAL | Age: 29
Discharge: HOME | DRG: 419 | End: 2019-08-22
Attending: EMERGENCY MEDICINE | Admitting: EMERGENCY MEDICINE
Payer: COMMERCIAL

## 2019-08-21 VITALS
WEIGHT: 114 LBS | SYSTOLIC BLOOD PRESSURE: 110 MMHG | BODY MASS INDEX: 18.97 KG/M2 | TEMPERATURE: 98.3 F | RESPIRATION RATE: 18 BRPM | DIASTOLIC BLOOD PRESSURE: 59 MMHG | HEART RATE: 75 BPM | OXYGEN SATURATION: 100 %

## 2019-08-21 DIAGNOSIS — M54.9 BACK PAIN: Primary | ICD-10-CM

## 2019-08-21 LAB — MISCELLANEOUS LAB TEST RESULT: NORMAL

## 2019-08-21 PROCEDURE — 99283 EMERGENCY DEPT VISIT LOW MDM: CPT

## 2019-08-21 PROCEDURE — 96372 THER/PROPH/DIAG INJ SC/IM: CPT

## 2019-08-21 RX ORDER — LIDOCAINE 50 MG/G
1 PATCH TOPICAL ONCE
Status: DISCONTINUED | OUTPATIENT
Start: 2019-08-21 | End: 2019-08-22 | Stop reason: HOSPADM

## 2019-08-21 RX ORDER — METHOCARBAMOL 500 MG/1
500 TABLET, FILM COATED ORAL 2 TIMES DAILY
Qty: 20 TABLET | Refills: 0 | Status: SHIPPED | OUTPATIENT
Start: 2019-08-21 | End: 2019-08-22 | Stop reason: SDUPTHER

## 2019-08-21 RX ORDER — KETOROLAC TROMETHAMINE 30 MG/ML
15 INJECTION, SOLUTION INTRAMUSCULAR; INTRAVENOUS ONCE
Status: COMPLETED | OUTPATIENT
Start: 2019-08-21 | End: 2019-08-21

## 2019-08-21 RX ADMIN — KETOROLAC TROMETHAMINE 15 MG: 30 INJECTION, SOLUTION INTRAMUSCULAR at 23:29

## 2019-08-21 RX ADMIN — LIDOCAINE 1 PATCH: 50 PATCH CUTANEOUS at 23:43

## 2019-08-22 ENCOUNTER — ANESTHESIA (INPATIENT)
Dept: PERIOP | Facility: HOSPITAL | Age: 29
DRG: 419 | End: 2019-08-22
Payer: COMMERCIAL

## 2019-08-22 ENCOUNTER — HOSPITAL ENCOUNTER (INPATIENT)
Facility: HOSPITAL | Age: 29
LOS: 1 days | Discharge: HOME | DRG: 419 | End: 2019-08-23
Attending: EMERGENCY MEDICINE | Admitting: SURGERY
Payer: COMMERCIAL

## 2019-08-22 ENCOUNTER — ANESTHESIA EVENT (INPATIENT)
Dept: PERIOP | Facility: HOSPITAL | Age: 29
DRG: 419 | End: 2019-08-22
Payer: COMMERCIAL

## 2019-08-22 ENCOUNTER — APPOINTMENT (EMERGENCY)
Dept: RADIOLOGY | Facility: HOSPITAL | Age: 29
DRG: 419 | End: 2019-08-22
Payer: COMMERCIAL

## 2019-08-22 DIAGNOSIS — K81.9 CHOLECYSTITIS: Primary | ICD-10-CM

## 2019-08-22 LAB
ALBUMIN SERPL BCP-MCNC: 4.3 G/DL (ref 3.5–5)
ALP SERPL-CCNC: 59 U/L (ref 46–116)
ALT SERPL W P-5'-P-CCNC: 26 U/L (ref 12–78)
ANION GAP SERPL CALCULATED.3IONS-SCNC: 8 MMOL/L (ref 4–13)
AST SERPL W P-5'-P-CCNC: 18 U/L (ref 5–45)
BACTERIA UR QL AUTO: ABNORMAL /HPF
BASOPHILS # BLD AUTO: 0.04 THOUSANDS/ΜL (ref 0–0.1)
BASOPHILS NFR BLD AUTO: 0 % (ref 0–1)
BILIRUB SERPL-MCNC: 0.65 MG/DL (ref 0.2–1)
BILIRUB UR QL STRIP: NEGATIVE
BUN SERPL-MCNC: 12 MG/DL (ref 5–25)
CALCIUM SERPL-MCNC: 9.5 MG/DL (ref 8.3–10.1)
CHLORIDE SERPL-SCNC: 107 MMOL/L (ref 100–108)
CLARITY UR: CLEAR
CO2 SERPL-SCNC: 26 MMOL/L (ref 21–32)
COLOR UR: YELLOW
COLOR, POC: NORMAL
CREAT SERPL-MCNC: 0.85 MG/DL (ref 0.6–1.3)
EOSINOPHIL # BLD AUTO: 0.01 THOUSAND/ΜL (ref 0–0.61)
EOSINOPHIL NFR BLD AUTO: 0 % (ref 0–6)
ERYTHROCYTE [DISTWIDTH] IN BLOOD BY AUTOMATED COUNT: 13.4 % (ref 11.6–15.1)
EXT PREG TEST URINE: NEGATIVE
EXT. CONTROL ED NAV: NORMAL
GFR SERPL CREATININE-BSD FRML MDRD: 93 ML/MIN/1.73SQ M
GLUCOSE SERPL-MCNC: 101 MG/DL (ref 65–140)
GLUCOSE UR STRIP-MCNC: NEGATIVE MG/DL
HCT VFR BLD AUTO: 42.5 % (ref 34.8–46.1)
HGB BLD-MCNC: 13.6 G/DL (ref 11.5–15.4)
HGB UR QL STRIP.AUTO: ABNORMAL
HYALINE CASTS #/AREA URNS LPF: ABNORMAL /LPF
IMM GRANULOCYTES # BLD AUTO: 0.06 THOUSAND/UL (ref 0–0.2)
IMM GRANULOCYTES NFR BLD AUTO: 1 % (ref 0–2)
KETONES UR STRIP-MCNC: ABNORMAL MG/DL
LEUKOCYTE ESTERASE UR QL STRIP: NEGATIVE
LIPASE SERPL-CCNC: 87 U/L (ref 73–393)
LYMPHOCYTES # BLD AUTO: 1.72 THOUSANDS/ΜL (ref 0.6–4.47)
LYMPHOCYTES NFR BLD AUTO: 13 % (ref 14–44)
MCH RBC QN AUTO: 28.7 PG (ref 26.8–34.3)
MCHC RBC AUTO-ENTMCNC: 32 G/DL (ref 31.4–37.4)
MCV RBC AUTO: 90 FL (ref 82–98)
MONOCYTES # BLD AUTO: 0.71 THOUSAND/ΜL (ref 0.17–1.22)
MONOCYTES NFR BLD AUTO: 5 % (ref 4–12)
NEUTROPHILS # BLD AUTO: 10.75 THOUSANDS/ΜL (ref 1.85–7.62)
NEUTS SEG NFR BLD AUTO: 81 % (ref 43–75)
NITRITE UR QL STRIP: NEGATIVE
NON-SQ EPI CELLS URNS QL MICRO: ABNORMAL /HPF
NRBC BLD AUTO-RTO: 0 /100 WBCS
PH UR STRIP.AUTO: 5.5 [PH] (ref 4.5–8)
PLATELET # BLD AUTO: 176 THOUSANDS/UL (ref 149–390)
PMV BLD AUTO: 12.2 FL (ref 8.9–12.7)
POTASSIUM SERPL-SCNC: 4 MMOL/L (ref 3.5–5.3)
PROT SERPL-MCNC: 7.7 G/DL (ref 6.4–8.2)
PROT UR STRIP-MCNC: ABNORMAL MG/DL
RBC # BLD AUTO: 4.74 MILLION/UL (ref 3.81–5.12)
RBC #/AREA URNS AUTO: ABNORMAL /HPF
SODIUM SERPL-SCNC: 141 MMOL/L (ref 136–145)
SP GR UR STRIP.AUTO: 1.02 (ref 1–1.03)
UROBILINOGEN UR QL STRIP.AUTO: 0.2 E.U./DL
WBC # BLD AUTO: 13.29 THOUSAND/UL (ref 4.31–10.16)
WBC #/AREA URNS AUTO: ABNORMAL /HPF

## 2019-08-22 PROCEDURE — 80053 COMPREHEN METABOLIC PANEL: CPT | Performed by: EMERGENCY MEDICINE

## 2019-08-22 PROCEDURE — 99284 EMERGENCY DEPT VISIT MOD MDM: CPT | Performed by: EMERGENCY MEDICINE

## 2019-08-22 PROCEDURE — 88304 TISSUE EXAM BY PATHOLOGIST: CPT | Performed by: PATHOLOGY

## 2019-08-22 PROCEDURE — 83690 ASSAY OF LIPASE: CPT | Performed by: EMERGENCY MEDICINE

## 2019-08-22 PROCEDURE — 99285 EMERGENCY DEPT VISIT HI MDM: CPT

## 2019-08-22 PROCEDURE — 81001 URINALYSIS AUTO W/SCOPE: CPT

## 2019-08-22 PROCEDURE — 81025 URINE PREGNANCY TEST: CPT | Performed by: EMERGENCY MEDICINE

## 2019-08-22 PROCEDURE — 36415 COLL VENOUS BLD VENIPUNCTURE: CPT

## 2019-08-22 PROCEDURE — 93005 ELECTROCARDIOGRAM TRACING: CPT

## 2019-08-22 PROCEDURE — 85025 COMPLETE CBC W/AUTO DIFF WBC: CPT | Performed by: EMERGENCY MEDICINE

## 2019-08-22 PROCEDURE — 76705 ECHO EXAM OF ABDOMEN: CPT

## 2019-08-22 PROCEDURE — 96374 THER/PROPH/DIAG INJ IV PUSH: CPT

## 2019-08-22 PROCEDURE — 99285 EMERGENCY DEPT VISIT HI MDM: CPT | Performed by: EMERGENCY MEDICINE

## 2019-08-22 PROCEDURE — 72070 X-RAY EXAM THORAC SPINE 2VWS: CPT

## 2019-08-22 PROCEDURE — 99223 1ST HOSP IP/OBS HIGH 75: CPT | Performed by: SURGERY

## 2019-08-22 PROCEDURE — 0FT44ZZ RESECTION OF GALLBLADDER, PERCUTANEOUS ENDOSCOPIC APPROACH: ICD-10-PCS | Performed by: SURGERY

## 2019-08-22 PROCEDURE — 47562 LAPAROSCOPIC CHOLECYSTECTOMY: CPT | Performed by: SURGERY

## 2019-08-22 PROCEDURE — 96375 TX/PRO/DX INJ NEW DRUG ADDON: CPT

## 2019-08-22 RX ORDER — HYDROMORPHONE HCL/PF 1 MG/ML
0.2 SYRINGE (ML) INJECTION
Status: DISCONTINUED | OUTPATIENT
Start: 2019-08-22 | End: 2019-08-22

## 2019-08-22 RX ORDER — TROLAMINE SALICYLATE 10 G/100G
CREAM TOPICAL 2 TIMES DAILY
Qty: 85 G | Refills: 0 | Status: SHIPPED | OUTPATIENT
Start: 2019-08-22 | End: 2019-08-27

## 2019-08-22 RX ORDER — MIDAZOLAM HYDROCHLORIDE 1 MG/ML
INJECTION INTRAMUSCULAR; INTRAVENOUS AS NEEDED
Status: DISCONTINUED | OUTPATIENT
Start: 2019-08-22 | End: 2019-08-22 | Stop reason: SURG

## 2019-08-22 RX ORDER — SODIUM CHLORIDE 9 MG/ML
125 INJECTION, SOLUTION INTRAVENOUS CONTINUOUS
Status: DISCONTINUED | OUTPATIENT
Start: 2019-08-22 | End: 2019-08-23

## 2019-08-22 RX ORDER — PROPOFOL 10 MG/ML
INJECTION, EMULSION INTRAVENOUS AS NEEDED
Status: DISCONTINUED | OUTPATIENT
Start: 2019-08-22 | End: 2019-08-22 | Stop reason: SURG

## 2019-08-22 RX ORDER — SODIUM CHLORIDE, SODIUM LACTATE, POTASSIUM CHLORIDE, CALCIUM CHLORIDE 600; 310; 30; 20 MG/100ML; MG/100ML; MG/100ML; MG/100ML
INJECTION, SOLUTION INTRAVENOUS CONTINUOUS PRN
Status: DISCONTINUED | OUTPATIENT
Start: 2019-08-22 | End: 2019-08-22 | Stop reason: SURG

## 2019-08-22 RX ORDER — ONDANSETRON 2 MG/ML
4 INJECTION INTRAMUSCULAR; INTRAVENOUS EVERY 6 HOURS PRN
Status: DISCONTINUED | OUTPATIENT
Start: 2019-08-22 | End: 2019-08-23 | Stop reason: HOSPADM

## 2019-08-22 RX ORDER — DEXAMETHASONE SODIUM PHOSPHATE 10 MG/ML
INJECTION, SOLUTION INTRAMUSCULAR; INTRAVENOUS AS NEEDED
Status: DISCONTINUED | OUTPATIENT
Start: 2019-08-22 | End: 2019-08-22 | Stop reason: SURG

## 2019-08-22 RX ORDER — ONDANSETRON 2 MG/ML
4 INJECTION INTRAMUSCULAR; INTRAVENOUS ONCE
Status: COMPLETED | OUTPATIENT
Start: 2019-08-22 | End: 2019-08-22

## 2019-08-22 RX ORDER — BUPIVACAINE HYDROCHLORIDE 5 MG/ML
INJECTION, SOLUTION PERINEURAL AS NEEDED
Status: DISCONTINUED | OUTPATIENT
Start: 2019-08-22 | End: 2019-08-22 | Stop reason: HOSPADM

## 2019-08-22 RX ORDER — KETOROLAC TROMETHAMINE 30 MG/ML
15 INJECTION, SOLUTION INTRAMUSCULAR; INTRAVENOUS ONCE
Status: COMPLETED | OUTPATIENT
Start: 2019-08-22 | End: 2019-08-22

## 2019-08-22 RX ORDER — LIDOCAINE HYDROCHLORIDE 10 MG/ML
INJECTION, SOLUTION INFILTRATION; PERINEURAL AS NEEDED
Status: DISCONTINUED | OUTPATIENT
Start: 2019-08-22 | End: 2019-08-22 | Stop reason: SURG

## 2019-08-22 RX ORDER — OXYCODONE HYDROCHLORIDE AND ACETAMINOPHEN 5; 325 MG/1; MG/1
1 TABLET ORAL EVERY 4 HOURS PRN
Status: DISCONTINUED | OUTPATIENT
Start: 2019-08-22 | End: 2019-08-23 | Stop reason: HOSPADM

## 2019-08-22 RX ORDER — ONDANSETRON 2 MG/ML
4 INJECTION INTRAMUSCULAR; INTRAVENOUS EVERY 4 HOURS PRN
Status: DISCONTINUED | OUTPATIENT
Start: 2019-08-22 | End: 2019-08-23

## 2019-08-22 RX ORDER — NAPROXEN 500 MG/1
500 TABLET ORAL 2 TIMES DAILY WITH MEALS
Qty: 30 TABLET | Refills: 0 | Status: SHIPPED | OUTPATIENT
Start: 2019-08-22 | End: 2019-08-27

## 2019-08-22 RX ORDER — ONDANSETRON 2 MG/ML
INJECTION INTRAMUSCULAR; INTRAVENOUS AS NEEDED
Status: DISCONTINUED | OUTPATIENT
Start: 2019-08-22 | End: 2019-08-22 | Stop reason: SURG

## 2019-08-22 RX ORDER — ONDANSETRON 2 MG/ML
4 INJECTION INTRAMUSCULAR; INTRAVENOUS ONCE AS NEEDED
Status: DISCONTINUED | OUTPATIENT
Start: 2019-08-22 | End: 2019-08-22

## 2019-08-22 RX ORDER — CEFAZOLIN SODIUM 2 G/50ML
2000 SOLUTION INTRAVENOUS EVERY 8 HOURS
Status: DISCONTINUED | OUTPATIENT
Start: 2019-08-22 | End: 2019-08-22

## 2019-08-22 RX ORDER — LIDOCAINE 50 MG/G
1 PATCH TOPICAL ONCE
Status: COMPLETED | OUTPATIENT
Start: 2019-08-22 | End: 2019-08-23

## 2019-08-22 RX ORDER — GLYCOPYRROLATE 0.2 MG/ML
INJECTION INTRAMUSCULAR; INTRAVENOUS AS NEEDED
Status: DISCONTINUED | OUTPATIENT
Start: 2019-08-22 | End: 2019-08-22 | Stop reason: SURG

## 2019-08-22 RX ORDER — HEPARIN SODIUM 5000 [USP'U]/ML
5000 INJECTION, SOLUTION INTRAVENOUS; SUBCUTANEOUS EVERY 8 HOURS SCHEDULED
Status: DISCONTINUED | OUTPATIENT
Start: 2019-08-22 | End: 2019-08-22 | Stop reason: SDUPTHER

## 2019-08-22 RX ORDER — HEPARIN SODIUM 5000 [USP'U]/ML
5000 INJECTION, SOLUTION INTRAVENOUS; SUBCUTANEOUS EVERY 8 HOURS SCHEDULED
Status: DISCONTINUED | OUTPATIENT
Start: 2019-08-22 | End: 2019-08-23 | Stop reason: HOSPADM

## 2019-08-22 RX ORDER — HYDROMORPHONE HCL/PF 1 MG/ML
0.5 SYRINGE (ML) INJECTION EVERY 2 HOUR PRN
Status: DISCONTINUED | OUTPATIENT
Start: 2019-08-22 | End: 2019-08-23 | Stop reason: HOSPADM

## 2019-08-22 RX ORDER — METHOCARBAMOL 500 MG/1
500 TABLET, FILM COATED ORAL 2 TIMES DAILY
Qty: 20 TABLET | Refills: 0 | Status: SHIPPED | OUTPATIENT
Start: 2019-08-22 | End: 2019-08-27

## 2019-08-22 RX ORDER — OXYCODONE HYDROCHLORIDE AND ACETAMINOPHEN 5; 325 MG/1; MG/1
2 TABLET ORAL EVERY 4 HOURS PRN
Status: DISCONTINUED | OUTPATIENT
Start: 2019-08-22 | End: 2019-08-23 | Stop reason: HOSPADM

## 2019-08-22 RX ORDER — LIDOCAINE HYDROCHLORIDE 10 MG/ML
0.5 INJECTION, SOLUTION EPIDURAL; INFILTRATION; INTRACAUDAL; PERINEURAL ONCE AS NEEDED
Status: DISCONTINUED | OUTPATIENT
Start: 2019-08-22 | End: 2019-08-22 | Stop reason: HOSPADM

## 2019-08-22 RX ORDER — MEPERIDINE HYDROCHLORIDE 25 MG/ML
12.5 INJECTION INTRAMUSCULAR; INTRAVENOUS; SUBCUTANEOUS
Status: DISCONTINUED | OUTPATIENT
Start: 2019-08-22 | End: 2019-08-22

## 2019-08-22 RX ORDER — ROCURONIUM BROMIDE 10 MG/ML
INJECTION, SOLUTION INTRAVENOUS AS NEEDED
Status: DISCONTINUED | OUTPATIENT
Start: 2019-08-22 | End: 2019-08-22 | Stop reason: SURG

## 2019-08-22 RX ORDER — KETAMINE HYDROCHLORIDE 50 MG/ML
INJECTION, SOLUTION, CONCENTRATE INTRAMUSCULAR; INTRAVENOUS AS NEEDED
Status: DISCONTINUED | OUTPATIENT
Start: 2019-08-22 | End: 2019-08-22 | Stop reason: SURG

## 2019-08-22 RX ORDER — FENTANYL CITRATE/PF 50 MCG/ML
25 SYRINGE (ML) INJECTION
Status: DISCONTINUED | OUTPATIENT
Start: 2019-08-22 | End: 2019-08-22

## 2019-08-22 RX ADMIN — MIDAZOLAM 2 MG: 1 INJECTION INTRAMUSCULAR; INTRAVENOUS at 18:01

## 2019-08-22 RX ADMIN — ONDANSETRON 4 MG: 2 INJECTION INTRAMUSCULAR; INTRAVENOUS at 18:01

## 2019-08-22 RX ADMIN — SODIUM CHLORIDE 125 ML/HR: 0.9 INJECTION, SOLUTION INTRAVENOUS at 21:41

## 2019-08-22 RX ADMIN — PROPOFOL 150 MG: 10 INJECTION, EMULSION INTRAVENOUS at 18:14

## 2019-08-22 RX ADMIN — FENTANYL CITRATE 25 MCG: 50 INJECTION, SOLUTION INTRAMUSCULAR; INTRAVENOUS at 21:02

## 2019-08-22 RX ADMIN — KETAMINE HYDROCHLORIDE 50 MG: 50 INJECTION, SOLUTION INTRAMUSCULAR; INTRAVENOUS at 18:14

## 2019-08-22 RX ADMIN — HEPARIN SODIUM 5000 UNITS: 5000 INJECTION INTRAVENOUS; SUBCUTANEOUS at 23:17

## 2019-08-22 RX ADMIN — FENTANYL CITRATE 25 MCG: 50 INJECTION, SOLUTION INTRAMUSCULAR; INTRAVENOUS at 21:37

## 2019-08-22 RX ADMIN — ROCURONIUM BROMIDE 40 MG: 10 INJECTION INTRAVENOUS at 18:14

## 2019-08-22 RX ADMIN — KETOROLAC TROMETHAMINE 15 MG: 30 INJECTION, SOLUTION INTRAMUSCULAR at 13:45

## 2019-08-22 RX ADMIN — DEXAMETHASONE SODIUM PHOSPHATE 10 MG: 10 INJECTION, SOLUTION INTRAMUSCULAR; INTRAVENOUS at 18:31

## 2019-08-22 RX ADMIN — LIDOCAINE 1 PATCH: 50 PATCH CUTANEOUS at 14:02

## 2019-08-22 RX ADMIN — SODIUM CHLORIDE, SODIUM LACTATE, POTASSIUM CHLORIDE, AND CALCIUM CHLORIDE: .6; .31; .03; .02 INJECTION, SOLUTION INTRAVENOUS at 19:00

## 2019-08-22 RX ADMIN — GLYCOPYRROLATE 0.1 MG: 0.2 INJECTION, SOLUTION INTRAMUSCULAR; INTRAVENOUS at 18:01

## 2019-08-22 RX ADMIN — SODIUM CHLORIDE, SODIUM LACTATE, POTASSIUM CHLORIDE, AND CALCIUM CHLORIDE: .6; .31; .03; .02 INJECTION, SOLUTION INTRAVENOUS at 17:44

## 2019-08-22 RX ADMIN — SUGAMMADEX 200 MG: 100 INJECTION, SOLUTION INTRAVENOUS at 20:03

## 2019-08-22 RX ADMIN — LIDOCAINE HYDROCHLORIDE 100 MG: 10 INJECTION, SOLUTION INFILTRATION; PERINEURAL at 18:14

## 2019-08-22 RX ADMIN — MEPERIDINE HYDROCHLORIDE 12.5 MG: 25 INJECTION INTRAMUSCULAR; INTRAVENOUS; SUBCUTANEOUS at 20:28

## 2019-08-22 RX ADMIN — ONDANSETRON 4 MG: 2 INJECTION INTRAMUSCULAR; INTRAVENOUS at 12:33

## 2019-08-22 RX ADMIN — METRONIDAZOLE 500 MG: 500 INJECTION, SOLUTION INTRAVENOUS at 17:21

## 2019-08-22 RX ADMIN — CEFAZOLIN SODIUM 2000 MG: 2 SOLUTION INTRAVENOUS at 16:43

## 2019-08-22 RX ADMIN — ONDANSETRON 4 MG: 2 INJECTION INTRAMUSCULAR; INTRAVENOUS at 19:36

## 2019-08-22 RX ADMIN — FENTANYL CITRATE 25 MCG: 50 INJECTION, SOLUTION INTRAMUSCULAR; INTRAVENOUS at 20:28

## 2019-08-22 NOTE — ED NOTES
Dr Alta Samuel and Gen Surg resident at bedside     [facility], [facility]  08/22/19 6170
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
## 2019-08-22 NOTE — ED ATTENDING ATTESTATION
Quique Humphrey MD, saw and evaluated the patient  I have discussed the patient with the resident/non-physician practitioner and agree with the resident's/non-physician practitioner's findings, Plan of Care, and MDM as documented in the resident's/non-physician practitioner's note, except where noted  All available labs and Radiology studies were reviewed  I was present for key portions of any procedure(s) performed by the resident/non-physician practitioner and I was immediately available to provide assistance  At this point I agree with the current assessment done in the Emergency Department  I have conducted an independent evaluation of this patient a history and physical is as follows:      Critical Care Time  Procedures     33 yo female with back pain and nausea, vomiting  Pt seen in ed yesterday and dx with msk pain and was improved but today pain returned  Pt with rib pain today  pmh scoliosis, ibs   Vss, afebrile, lungs cta, rrr, abdomen soft nontender, thoracic tenderness  Labs, xray, pain meds, zofran
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
## 2019-08-22 NOTE — ANESTHESIA PREPROCEDURE EVALUATION
Review of Systems/Medical History  Patient summary reviewed  Chart reviewed  No history of anesthetic complications     Cardiovascular   Pulmonary  Asthma , PRN med  controlled , No recent URI ,        GI/Hepatic    GERD ,             Endo/Other     GYN       Hematology   Musculoskeletal       Neurology    Headaches,    Psychology   Anxiety,              Physical Exam    Airway    Mallampati score: I  TM Distance: >3 FB       Dental   No notable dental hx     Cardiovascular      Pulmonary      Other Findings      Lab Results   Component Value Date    WBC 13 29 (H) 08/22/2019    HGB 13 6 08/22/2019     08/22/2019     Lab Results   Component Value Date    SODIUM 141 08/22/2019    K 4 0 08/22/2019    BUN 12 08/22/2019    CREATININE 0 85 08/22/2019    GLUCOSE 66 06/11/2014     Lab Results   Component Value Date    PTT 29 08/19/2014      Lab Results   Component Value Date    INR 1 01 08/19/2014         No results found for: HGBA1C      Anesthesia Plan  ASA Score- 2     Anesthesia Type- general with ASA Monitors  Additional Monitors:   Airway Plan: ETT  Comment:  KARIE Perea , have personally seen and evaluated the patient prior to anesthetic care  I have reviewed the pre-anesthetic record, and other medical records if appropriate to the anesthetic care  If a CRNA is involved in the case, I have reviewed the CRNA assessment, if present, and agree  Risks/benefits and alternatives discussed with patient including possible PONV, sore throat, and possibility of rare anesthetic and surgical emergencies  NPO STATUS CONFIRMED  Plan Factors-    Induction- intravenous  Postoperative Plan- Plan for postoperative opioid use  Planned trial extubation    Informed Consent- Anesthetic plan and risks discussed with patient  I personally reviewed this patient with the CRNA  Discussed and agreed on the Anesthesia Plan with the CRNA  Annalee Gibson
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
## 2019-08-22 NOTE — ED ATTENDING ATTESTATION
Stella Hester MD, saw and evaluated the patient  I have discussed the patient with the resident/non-physician practitioner and agree with the resident's/non-physician practitioner's findings, Plan of Care, and MDM as documented in the resident's/non-physician practitioner's note, except where noted  All available labs and Radiology studies were reviewed  I was present for key portions of any procedure(s) performed by the resident/non-physician practitioner and I was immediately available to provide assistance  At this point I agree with the current assessment done in the Emergency Department  I have conducted an independent evaluation of this patient a history and physical is as follows: This is a 34 y o  old female who presents to the ED for evaluation of back pain  History of the this afternoon started with mid back pain, mostly, nonradiating  No radicular symptoms  So sure pain mm back  Worse with movement  History of scoliosis  Took 1 dose of Aleve and Tylenol at home with minimal relief  No red flag symptoms  No previous back surgeries  She has seen Dr Lucius Figueroa for outpatient follow-up of her scoliosis and has undergone PT in the past     Patient appears well nourished, normally developed, no acute distress  Vital signs as documented  Head exam is atraumatic  Pupils EOMI, no scleral icterus or corneal injection noted  Neck is supple without JVD  Respirations are normal without increase work of breathing or audible noises  Cardiac exam shows regular rate and rhythm  Patient is moving all extremities equally, able to ambulate with normal gait under own power  There is tenderness to palpation along the bilateral paraspinal muscles in the region of T8-T10 there is no spinous process tenderness or overlying skin changes  Patient is awake, alert, oriented ×4 without focal neurologic deficit  Skin is warm, dry, intact without rash      A/P: This is a 34 y o  female who presents to the ED for evaluation of back pain  Suspect suspect musculoskeletal, no evidence her stay trauma to support fracture    NSAIDs, comprehensive spine referral       Critical Care Time  Procedures

## 2019-08-23 VITALS
SYSTOLIC BLOOD PRESSURE: 101 MMHG | RESPIRATION RATE: 20 BRPM | HEART RATE: 46 BPM | TEMPERATURE: 98.9 F | HEIGHT: 65 IN | BODY MASS INDEX: 18.99 KG/M2 | OXYGEN SATURATION: 99 % | WEIGHT: 113.98 LBS | DIASTOLIC BLOOD PRESSURE: 55 MMHG

## 2019-08-23 LAB
ALBUMIN SERPL BCP-MCNC: 3 G/DL (ref 3.5–5)
ALP SERPL-CCNC: 47 U/L (ref 46–116)
ALT SERPL W P-5'-P-CCNC: 32 U/L (ref 12–78)
ANION GAP SERPL CALCULATED.3IONS-SCNC: 4 MMOL/L (ref 4–13)
AST SERPL W P-5'-P-CCNC: 32 U/L (ref 5–45)
ATRIAL RATE: 51 BPM
BILIRUB SERPL-MCNC: 0.56 MG/DL (ref 0.2–1)
BUN SERPL-MCNC: 9 MG/DL (ref 5–25)
CALCIUM SERPL-MCNC: 8.2 MG/DL (ref 8.3–10.1)
CHLORIDE SERPL-SCNC: 107 MMOL/L (ref 100–108)
CO2 SERPL-SCNC: 26 MMOL/L (ref 21–32)
CREAT SERPL-MCNC: 0.68 MG/DL (ref 0.6–1.3)
ERYTHROCYTE [DISTWIDTH] IN BLOOD BY AUTOMATED COUNT: 13.6 % (ref 11.6–15.1)
GFR SERPL CREATININE-BSD FRML MDRD: 119 ML/MIN/1.73SQ M
GLUCOSE SERPL-MCNC: 146 MG/DL (ref 65–140)
HCT VFR BLD AUTO: 36.2 % (ref 34.8–46.1)
HGB BLD-MCNC: 11.5 G/DL (ref 11.5–15.4)
MAGNESIUM SERPL-MCNC: 1.9 MG/DL (ref 1.6–2.6)
MCH RBC QN AUTO: 28.5 PG (ref 26.8–34.3)
MCHC RBC AUTO-ENTMCNC: 31.8 G/DL (ref 31.4–37.4)
MCV RBC AUTO: 90 FL (ref 82–98)
P AXIS: 266 DEGREES
PLATELET # BLD AUTO: 143 THOUSANDS/UL (ref 149–390)
PMV BLD AUTO: 12.6 FL (ref 8.9–12.7)
POTASSIUM SERPL-SCNC: 4 MMOL/L (ref 3.5–5.3)
PR INTERVAL: 108 MS
PROT SERPL-MCNC: 6.3 G/DL (ref 6.4–8.2)
QRS AXIS: 81 DEGREES
QRSD INTERVAL: 90 MS
QT INTERVAL: 450 MS
QTC INTERVAL: 414 MS
RBC # BLD AUTO: 4.03 MILLION/UL (ref 3.81–5.12)
SODIUM SERPL-SCNC: 137 MMOL/L (ref 136–145)
T WAVE AXIS: 57 DEGREES
VENTRICULAR RATE: 51 BPM
WBC # BLD AUTO: 11.16 THOUSAND/UL (ref 4.31–10.16)

## 2019-08-23 PROCEDURE — NC001 PR NO CHARGE: Performed by: SURGERY

## 2019-08-23 PROCEDURE — 85027 COMPLETE CBC AUTOMATED: CPT | Performed by: STUDENT IN AN ORGANIZED HEALTH CARE EDUCATION/TRAINING PROGRAM

## 2019-08-23 PROCEDURE — 99024 POSTOP FOLLOW-UP VISIT: CPT | Performed by: SURGERY

## 2019-08-23 PROCEDURE — 80053 COMPREHEN METABOLIC PANEL: CPT | Performed by: STUDENT IN AN ORGANIZED HEALTH CARE EDUCATION/TRAINING PROGRAM

## 2019-08-23 PROCEDURE — 83735 ASSAY OF MAGNESIUM: CPT | Performed by: SURGERY

## 2019-08-23 PROCEDURE — 93010 ELECTROCARDIOGRAM REPORT: CPT | Performed by: INTERNAL MEDICINE

## 2019-08-23 RX ORDER — OXYCODONE HYDROCHLORIDE AND ACETAMINOPHEN 5; 325 MG/1; MG/1
1 TABLET ORAL EVERY 4 HOURS PRN
Qty: 10 TABLET | Refills: 0 | Status: SHIPPED | OUTPATIENT
Start: 2019-08-23 | End: 2019-09-02

## 2019-08-23 RX ORDER — ONDANSETRON 2 MG/ML
4 INJECTION INTRAMUSCULAR; INTRAVENOUS EVERY 4 HOURS PRN
Status: DISCONTINUED | OUTPATIENT
Start: 2019-08-23 | End: 2019-08-23 | Stop reason: HOSPADM

## 2019-08-23 RX ORDER — ACETAMINOPHEN 325 MG/1
650 TABLET ORAL EVERY 6 HOURS PRN
Status: DISCONTINUED | OUTPATIENT
Start: 2019-08-23 | End: 2019-08-23 | Stop reason: HOSPADM

## 2019-08-23 RX ADMIN — SODIUM CHLORIDE 125 ML/HR: 0.9 INJECTION, SOLUTION INTRAVENOUS at 05:25

## 2019-08-23 RX ADMIN — ACETAMINOPHEN 650 MG: 325 TABLET ORAL at 05:48

## 2019-08-23 RX ADMIN — HEPARIN SODIUM 5000 UNITS: 5000 INJECTION INTRAVENOUS; SUBCUTANEOUS at 05:16

## 2019-08-23 NOTE — DISCHARGE INSTRUCTIONS
Please follow-up as scheduled  Activity:  - No lifting greater than 20 pounds or strenuous physical activity or exercise for 2 weeks  - Walking and normal light activities are encouraged  - Normal daily activities including climbing steps are okay  - No driving until no longer using pain medications  Diet:    - You may resume your normal diet  Wound Care:  - May shower daily  No tub baths or swimming until cleared by your surgeon   - Wash incision gently with soap and water and pat dry  - Do not apply any creams or ointments unless instructed to do so by your surgeon   - Virgene Back may apply ice as needed (no longer than 20 minutes at a time) for the first 48 hours  - Bruising is not unusual and will go away with a little time  You may apply a warm, moist compress that may help the bruising resolve quicker  - You may remove the dressings the day after surgery (unless otherwise instructed)  Leave any skin tapes (steri-strips) on the incision(s) in place until they fall off on their own  Any new dressings are optional     Medications:    - You may resume all of your regular medications, including blood thinners and aspirin, after going home unless otherwise instructed  Please refer to your discharge medication list for further details  - Please take the pain medications as directed  - You are encouraged to use non-narcotic pain medications first and whenever possible  Reserve the use of narcotic pain medication for moderate to severe pain not controlled by non-narcotic medications   - No driving while taking narcotic pain medications  - You may become constipated, especially if taking pain medications  You may take any over the counter stool softeners or laxatives as needed  Examples: Milk of Magnesia, Colace, Senna      Additional Instructions:  - If you have any questions or concerns after discharge please call the office   - Call office or return to ER if fever greater than 101, chills, persistent nausea/vomiting, worsening/uncontrollable pain, and/or increasing redness or purulent/foul smelling drainage from incision(s)

## 2019-08-23 NOTE — SOCIAL WORK
Cm met with Patient explained CM role  Patient lives in a two story house with her parents and her brother  Patient was independent PTA  She is supposed to start working for the [employer] on Monday as a   Patient drives  No DME  No living will  Declined information  CM did patient teaching about patients concerns for constipation s/p gall bladder surgery  CM reviewed d/c planning process including the following: identifying help at home, patient preference for d/c planning needs, Discharge Lounge, [facility] Meds to Bed program, availability of treatment team to discuss questions or concerns patient and/or family may have regarding understanding medications and recognizing signs and symptoms once discharged  CM also encouraged patient to follow up with all recommended appointments after discharge  Patient advised of importance for patient and family to participate in managing patients medical well being

## 2019-08-23 NOTE — DISCHARGE SUMMARY
Discharge Summary - Sami Shepard 34 y o  female MRN: [id number]    Unit/Bed#: [facility] 832-01 Encounter: [id number]    Admission Date:   Admission Orders (From admission, onward)     Ordered        08/22/19 1545  Inpatient Admission  Once                     Admitting Diagnosis: Cholecystitis [K81 9]  Vomiting [R11 10]    HPI: 34 y o  female who presents with one day of RUQ/right sided back pain  Patient states she came to the ED last night when the pain started, she was discharged on pain medication and anti-spasmodics  Her pain got worse associated with nausea so she came back this afternoon  She had one similar episode in June where she had epigastric abdominal pain and was also evaluated at that time  She was treated with Protonix and Carafate and her pain eventually went away         Procedures Performed:   Orders Placed This Encounter   Procedures    ED ECG Documentation Only       Summary of Hospital Course: In the emergency department, she underwent ultrasound which showed findings suspicious for acute cholecystitis  CBD was 3 mm and normal   White count slightly elevated, LFTs are normal   She was admitted to the general surgery team, and underwent laparoscopic cholecystectomy that evening  She had been started on Ancef and Flagyl preop, these were discontinued postoperatively  The following day she was tolerating p o  Diet, ambulating, and pain was well controlled on oral medications  She was discharged in good condition on postop day 1  Significant Findings, Care, Treatment and Services Provided: as above    Complications: None    Discharge Diagnosis: acute cholecysitis    Resolved Problems  Date Reviewed: 10/17/2018    None          Condition at Discharge: good         Discharge instructions/Information to patient and family:   See after visit summary for information provided to patient and family        Provisions for Follow-Up Care:  See after visit summary for information related to follow-up care and any pertinent home health orders  PCP: Lizandro White MD    Disposition: Home    Planned Readmission: No    Discharge Statement   I spent 30 minutes discharging the patient  This time was spent on the day of discharge  I had direct contact with the patient on the day of discharge  Additional documentation is required if more than 30 minutes were spent on discharge  Discharge Medications:  See after visit summary for reconciled discharge medications provided to patient and family

## 2019-08-23 NOTE — ANESTHESIA POSTPROCEDURE EVALUATION
Post-Op Assessment Note    CV Status:  Stable    Pain management: adequate     Mental Status:  Alert and awake   Hydration Status:  Euvolemic   PONV Controlled:  Controlled   Airway Patency:  Patent   Post Op Vitals Reviewed: Yes      Staff: CRNA           BP   120/58   Temp (P) 98 1 °F (36 7 °C) (08/22/19 2017)    Pulse (P) 83 (08/22/19 2017)   Resp (P) 16 (08/22/19 2017)    SpO2 (P) 100 % (08/22/19 2017)

## 2019-08-23 NOTE — PROGRESS NOTES
Progress Note - General Surgery   Lizzie Lassiter 34 y o  female MRN: [id number]  Unit/Bed#: [facility] 832-01 Encounter: [id number]    Assessment:  33 yo F with acute cholecystitis sp lap evi    Plan:  Regular diet  D/C IVF  PRN pain control - oral meds  OOB, ambulation  D/C today    Subjective/Objective   Chief Complaint:     Subjective: A little sore but overall doing well    Objective:     Blood pressure 101/54, pulse (!) 46, temperature 97 5 °F (36 4 °C), resp  rate 16, last menstrual period 08/11/2019, SpO2 99 %  ,There is no height or weight on file to calculate BMI        Intake/Output Summary (Last 24 hours) at 8/23/2019 0553  Last data filed at 8/23/2019 0516  Gross per 24 hour   Intake 2600 ml   Output 380 ml   Net 2220 ml       Invasive Devices     Peripheral Intravenous Line            Peripheral IV 08/22/19 Left Hand less than 1 day                Physical Exam:   Gen: A&O, NAD  Cardio: RRR  Lungs: CTAB  Abd: Soft, non distended, appropriately tender, incisions c/d/i      Lab, Imaging and other studies:  CBC:   Lab Results   Component Value Date    WBC 11 16 (H) 08/23/2019    HGB 11 5 08/23/2019    HCT 36 2 08/23/2019    MCV 90 08/23/2019     (L) 08/23/2019    MCH 28 5 08/23/2019    MCHC 31 8 08/23/2019    RDW 13 6 08/23/2019    MPV 12 6 08/23/2019    NRBC 0 08/22/2019   , CMP:   Lab Results   Component Value Date    SODIUM 137 08/23/2019    K 4 0 08/23/2019     08/23/2019    CO2 26 08/23/2019    BUN 9 08/23/2019    CREATININE 0 68 08/23/2019    CALCIUM 8 2 (L) 08/23/2019    AST 32 08/23/2019    ALT 32 08/23/2019    ALKPHOS 47 08/23/2019    EGFR 119 08/23/2019   , Coagulation: No results found for: PT, INR, APTT  VTE Pharmacologic Prophylaxis: Heparin  VTE Mechanical Prophylaxis: sequential compression device

## 2019-08-23 NOTE — OP NOTE
OPERATIVE REPORT  PATIENT NAME: Homar Rosa    :  1990  MRN: [id number]  Pt Location:  OR ROOM 08    SURGERY DATE: 2019    Surgeon(s) and Role:     * Brandie Reynaga MD - Primary     * Patricio Newman MD - Assisting     * Niru Santillan MD - Assisting    Preop Diagnosis:  Cholecystitis [K81 9]    Post-Op Diagnosis Codes:     * Cholecystitis [K81 9]    Procedure(s) (LRB):  CHOLECYSTECTOMY LAPAROSCOPIC (N/A)    Specimen(s):  ID Type Source Tests Collected by Time Destination   1 :  Tissue Gallbladder TISSUE EXAM Brandie Reynaga MD 2019        Estimated Blood Loss:   25 mL    Drains:  * No LDAs found *    Anesthesia Type:   General    Operative Indications:  Cholecystitis [K81 9]    Operative Findings:  Acute calculous cholecystitis    Complications:   None    Procedure and Technique:  Patient was brought to the operating room and identified by name and armband  After placement supine position, general anesthesia was administered and an endotracheal tube was placed  The abdomen was prepped and draped in usual sterile fashion  A time-out was performed confirming the patient and the procedure  The curvilinear incision was made infraumbilically  Abdominal entry was established via direct cutdown of the anterior abdominal wall  A 5 mm trocar was placed into the abdomen and the peritoneal cavity was insufflated  A 5 mm trocar was placed in the subxiphoid abdomen  Attention was then turned to our entry site and we confirmed that there was no intra-abdominal injury upon entry  The infraumbilical trocar was then exchanged for a 12 mm trocar under direct visualization  2 additional 5 mm trocars were placed in the right mid abdomen and right lateral abdomen respectively  The gallbladder was grasped at the fundus and retracted cephalad  Was noted to be significantly inflamed and distended    An aspiration needle was introduced into the gallbladder fundus and approximately 100 cc of bile was aspirated decompress the gallbladder  The infundibulum was identified and retracted laterally the peritoneum was scored from the infundibulum to the liver bed both anteriorly and posteriorly  This was pulled down to expose the cystic structures  The cystic duct was identified and circumferentially dissected to reveal a singular entry into the gallbladder  The cystic artery was noted to directly into the gallbladder and circumferentially dissected  The gallbladder infundibulum and body was cleared of its peritoneal attachments to expose the cystic plate and our critical view of safety was achieved  The cystic duct was doubly clipped and ligated  The cystic artery was doubly clipped and ligated  The gallbladder was then dissected free from the liver bed maintaining hemostasis  Site was then copiously irrigated and suctioned and hemostasis was achieved  The abdomen was desufflated and all ports were removed under visualization  The midline 12 mm trocar was closed with interrupted Vicryl sutures  The skin was closed with Monocryl  Skin glue was applied  All counts were correct at the end of the procedure  RF wanding was negative for retained foreign bodies  The patient was woken from general anesthesia and extubated in routine fashion  He was transferred to PACU in stable condition  The attending physician was present for all portions of the procedure        Patient Disposition:  PACU     SIGNATURE: Oksana Cardenas MD  DATE: August 22, 2019  TIME: 8:23 PM
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
## 2019-08-23 NOTE — PLAN OF CARE
Problem: PAIN - ADULT  Goal: Verbalizes/displays adequate comfort level or baseline comfort level  Description  Interventions:  - Encourage patient to monitor pain and request assistance  - Assess pain using appropriate pain scale  - Administer analgesics based on type and severity of pain and evaluate response  - Implement non-pharmacological measures as appropriate and evaluate response  - Consider cultural and social influences on pain and pain management  - Notify physician/advanced practitioner if interventions unsuccessful or patient reports new pain  8/22/2019 2313 by Tyler Patel RN  Outcome: Progressing  8/22/2019 2307 by Tyler Patel RN  Outcome: Progressing     Problem: INFECTION - ADULT  Goal: Absence or prevention of progression during hospitalization  Description  INTERVENTIONS:  - Assess and monitor for signs and symptoms of infection  - Monitor lab/diagnostic results  - Monitor all insertion sites, i e  indwelling lines, tubes, and drains  - Monitor endotracheal if appropriate and nasal secretions for changes in amount and color  - [place] appropriate cooling/warming therapies per order  - Administer medications as ordered  - Instruct and encourage patient and family to use good hand hygiene technique  - Identify and instruct in appropriate isolation precautions for identified infection/condition  8/22/2019 2313 by Tyler Patel RN  Outcome: Progressing  8/22/2019 2307 by Tyler Patel RN  Outcome: Progressing  Goal: Absence of fever/infection during neutropenic period  Description  INTERVENTIONS:  - Monitor WBC    8/22/2019 2313 by Tyler Patel RN  Outcome: Progressing  8/22/2019 2307 by Tyler Patel RN  Outcome: Progressing     Problem: SAFETY ADULT  Goal: Patient will remain free of falls  Description  INTERVENTIONS:  - Assess patient frequently for physical needs  -  Identify cognitive and physical deficits and behaviors that affect risk of falls    - [place] fall precautions as indicated by assessment   - Educate patient/family on patient safety including physical limitations  - Instruct patient to call for assistance with activity based on assessment  - Modify environment to reduce risk of injury  - Consider OT/PT consult to assist with strengthening/mobility  8/22/2019 2313 by Pablo Christensen RN  Outcome: Progressing  8/22/2019 2307 by Pablo Christensen RN  Outcome: Progressing  Goal: Maintain or return to baseline ADL function  Description  INTERVENTIONS:  -  Assess patient's ability to carry out ADLs; assess patient's baseline for ADL function and identify physical deficits which impact ability to perform ADLs (bathing, care of mouth/teeth, toileting, grooming, dressing, etc )  - Assess/evaluate cause of self-care deficits   - Assess range of motion  - Assess patient's mobility; develop plan if impaired  - Assess patient's need for assistive devices and provide as appropriate  - Encourage maximum independence but intervene and supervise when necessary  - Involve family in performance of ADLs  - Assess for home care needs following discharge   - Consider OT consult to assist with ADL evaluation and planning for discharge  - Provide patient education as appropriate  8/22/2019 2313 by Pablo Christensen RN  Outcome: Progressing  8/22/2019 2307 by Pablo Christensen RN  Outcome: Progressing  Goal: Maintain or return mobility status to optimal level  Description  INTERVENTIONS:  - Assess patient's baseline mobility status (ambulation, transfers, stairs, etc )    - Identify cognitive and physical deficits and behaviors that affect mobility  - Identify mobility aids required to assist with transfers and/or ambulation (gait belt, sit-to-stand, lift, walker, cane, etc )  - [place] fall precautions as indicated by assessment  - Record patient progress and toleration of activity level on Mobility SBAR; progress patient to next Phase/Stage  - Instruct patient to call for assistance with activity based on assessment  - Consider rehabilitation consult to assist with strengthening/weightbearing, etc   8/22/2019 2313 by Colby Park RN  Outcome: Progressing  8/22/2019 2307 by Colby Park RN  Outcome: Progressing     Problem: DISCHARGE PLANNING  Goal: Discharge to home or other facility with appropriate resources  Description  INTERVENTIONS:  - Identify barriers to discharge w/patient and caregiver  - Arrange for needed discharge resources and transportation as appropriate  - Identify discharge learning needs (meds, wound care, etc )  - Arrange for interpretive services to assist at discharge as needed  - Refer to Case Management Department for coordinating discharge planning if the patient needs post-hospital services based on physician/advanced practitioner order or complex needs related to functional status, cognitive ability, or social support system  8/22/2019 2313 by Colby Park RN  Outcome: Progressing  8/22/2019 2307 by Colby Park RN  Outcome: Progressing     Problem: Knowledge Deficit  Goal: Patient/family/caregiver demonstrates understanding of disease process, treatment plan, medications, and discharge instructions  Description  Complete learning assessment and assess knowledge base    Interventions:  - Provide teaching at level of understanding  - Provide teaching via preferred learning methods  8/22/2019 2313 by Colby Park RN  Outcome: Progressing  8/22/2019 2307 by Colby Park RN  Outcome: Progressing

## 2019-08-23 NOTE — PLAN OF CARE
Problem: PAIN - ADULT  Goal: Verbalizes/displays adequate comfort level or baseline comfort level  Description  Interventions:  - Encourage patient to monitor pain and request assistance  - Assess pain using appropriate pain scale  - Administer analgesics based on type and severity of pain and evaluate response  - Implement non-pharmacological measures as appropriate and evaluate response  - Consider cultural and social influences on pain and pain management  - Notify physician/advanced practitioner if interventions unsuccessful or patient reports new pain  Outcome: Progressing     Problem: INFECTION - ADULT  Goal: Absence or prevention of progression during hospitalization  Description  INTERVENTIONS:  - Assess and monitor for signs and symptoms of infection  - Monitor lab/diagnostic results  - Monitor all insertion sites, i e  indwelling lines, tubes, and drains  - Monitor endotracheal if appropriate and nasal secretions for changes in amount and color  - [place] appropriate cooling/warming therapies per order  - Administer medications as ordered  - Instruct and encourage patient and family to use good hand hygiene technique  - Identify and instruct in appropriate isolation precautions for identified infection/condition  Outcome: Progressing  Goal: Absence of fever/infection during neutropenic period  Description  INTERVENTIONS:  - Monitor WBC    Outcome: Progressing     Problem: SAFETY ADULT  Goal: Patient will remain free of falls  Description  INTERVENTIONS:  - Assess patient frequently for physical needs  -  Identify cognitive and physical deficits and behaviors that affect risk of falls    -  [place] fall precautions as indicated by assessment   - Educate patient/family on patient safety including physical limitations  - Instruct patient to call for assistance with activity based on assessment  - Modify environment to reduce risk of injury  - Consider OT/PT consult to assist with strengthening/mobility  Outcome: Progressing  Goal: Maintain or return to baseline ADL function  Description  INTERVENTIONS:  -  Assess patient's ability to carry out ADLs; assess patient's baseline for ADL function and identify physical deficits which impact ability to perform ADLs (bathing, care of mouth/teeth, toileting, grooming, dressing, etc )  - Assess/evaluate cause of self-care deficits   - Assess range of motion  - Assess patient's mobility; develop plan if impaired  - Assess patient's need for assistive devices and provide as appropriate  - Encourage maximum independence but intervene and supervise when necessary  - Involve family in performance of ADLs  - Assess for home care needs following discharge   - Consider OT consult to assist with ADL evaluation and planning for discharge  - Provide patient education as appropriate  Outcome: Progressing  Goal: Maintain or return mobility status to optimal level  Description  INTERVENTIONS:  - Assess patient's baseline mobility status (ambulation, transfers, stairs, etc )    - Identify cognitive and physical deficits and behaviors that affect mobility  - Identify mobility aids required to assist with transfers and/or ambulation (gait belt, sit-to-stand, lift, walker, cane, etc )  - [place] fall precautions as indicated by assessment  - Record patient progress and toleration of activity level on Mobility SBAR; progress patient to next Phase/Stage  - Instruct patient to call for assistance with activity based on assessment  - Consider rehabilitation consult to assist with strengthening/weightbearing, etc   Outcome: Progressing     Problem: DISCHARGE PLANNING  Goal: Discharge to home or other facility with appropriate resources  Description  INTERVENTIONS:  - Identify barriers to discharge w/patient and caregiver  - Arrange for needed discharge resources and transportation as appropriate  - Identify discharge learning needs (meds, wound care, etc )  - Arrange for interpretive services to assist at discharge as needed  - Refer to Case Management Department for coordinating discharge planning if the patient needs post-hospital services based on physician/advanced practitioner order or complex needs related to functional status, cognitive ability, or social support system  Outcome: Progressing     Problem: Knowledge Deficit  Goal: Patient/family/caregiver demonstrates understanding of disease process, treatment plan, medications, and discharge instructions  Description  Complete learning assessment and assess knowledge base    Interventions:  - Provide teaching at level of understanding  - Provide teaching via preferred learning methods  Outcome: Progressing

## 2019-08-23 NOTE — QUICK NOTE
Nurse / Provider rounds completed with staff nurse, Maryann Jean Baptiste and patient  Will discharge home today

## 2019-08-26 ENCOUNTER — TRANSITIONAL CARE MANAGEMENT (OUTPATIENT)
Dept: FAMILY MEDICINE CLINIC | Facility: CLINIC | Age: 29
End: 2019-08-26

## 2019-08-28 ENCOUNTER — OFFICE VISIT (OUTPATIENT)
Dept: FAMILY MEDICINE CLINIC | Facility: CLINIC | Age: 29
End: 2019-08-28
Payer: COMMERCIAL

## 2019-08-28 VITALS
TEMPERATURE: 97.5 F | BODY MASS INDEX: 18.13 KG/M2 | RESPIRATION RATE: 16 BRPM | SYSTOLIC BLOOD PRESSURE: 98 MMHG | HEIGHT: 65 IN | WEIGHT: 108.8 LBS | DIASTOLIC BLOOD PRESSURE: 62 MMHG | HEART RATE: 92 BPM | OXYGEN SATURATION: 97 %

## 2019-08-28 DIAGNOSIS — IMO0001 TRANSITION OF CARE PERFORMED WITH SHARING OF CLINICAL SUMMARY: Primary | ICD-10-CM

## 2019-08-28 DIAGNOSIS — Z90.49 S/P LAPAROSCOPIC CHOLECYSTECTOMY: ICD-10-CM

## 2019-08-28 DIAGNOSIS — D72.829 LEUKOCYTOSIS, UNSPECIFIED TYPE: ICD-10-CM

## 2019-08-28 DIAGNOSIS — D69.6 DECREASED PLATELET COUNT (HCC): ICD-10-CM

## 2019-08-28 PROCEDURE — 99496 TRANSJ CARE MGMT HIGH F2F 7D: CPT | Performed by: FAMILY MEDICINE
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
## 2019-08-29 ENCOUNTER — OFFICE VISIT (OUTPATIENT)
Dept: SURGERY | Facility: CLINIC | Age: 29
End: 2019-08-29
Payer: COMMERCIAL

## 2019-08-29 VITALS
WEIGHT: 108.4 LBS | HEART RATE: 90 BPM | SYSTOLIC BLOOD PRESSURE: 92 MMHG | DIASTOLIC BLOOD PRESSURE: 64 MMHG | HEIGHT: 65 IN | TEMPERATURE: 98.4 F | BODY MASS INDEX: 18.06 KG/M2

## 2019-08-29 DIAGNOSIS — T81.49XA SURGICAL SITE INFECTION: Primary | ICD-10-CM

## 2019-08-29 PROCEDURE — 99213 OFFICE O/P EST LOW 20 MIN: CPT | Performed by: SURGERY

## 2019-08-29 RX ORDER — CEPHALEXIN 500 MG/1
500 CAPSULE ORAL EVERY 6 HOURS SCHEDULED
Qty: 40 CAPSULE | Refills: 0 | Status: SHIPPED | OUTPATIENT
Start: 2019-08-29 | End: 2019-09-08

## 2019-08-29 NOTE — LETTER
August 29, 2019     Patient: Jas Sanchez   YOB: 1990   Date of Visit: 8/29/2019       To Whom it May Concern:    Radha Bowles is under my professional care  She was seen in my office on 8/29/2019  She may return to work on 9-30-19 without restrictions  If you have any questions or concerns, please don't hesitate to call           Sincerely,          Bri Hill MD      CC: No Recipients

## 2019-08-29 NOTE — ASSESSMENT & PLAN NOTE
Assessment:  Pt is a 35 y/o F s/p hien steve on 8/22  Pt following up in the office d/t drainage and rash around umbilicus  C/o of night sweats, and prior history of surgical site infection  For this reason, abx being prescribed  Plan:  Keflex 500 mg Q 6 hours for 10 days  Follow-up in the clinic kirt Pham
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
## 2019-09-03 ENCOUNTER — TELEPHONE (OUTPATIENT)
Dept: SURGERY | Facility: CLINIC | Age: 29
End: 2019-09-03

## 2019-09-03 NOTE — TELEPHONE ENCOUNTER
Patient called stating incision site is red, irritated, itchy, with white heads   Patient also states she has a yeast infection drom the ABX, please advise

## 2019-09-05 ENCOUNTER — OFFICE VISIT (OUTPATIENT)
Dept: SURGERY | Facility: CLINIC | Age: 29
End: 2019-09-05

## 2019-09-05 ENCOUNTER — LAB (OUTPATIENT)
Dept: LAB | Facility: CLINIC | Age: 29
End: 2019-09-05
Payer: COMMERCIAL

## 2019-09-05 ENCOUNTER — TELEPHONE (OUTPATIENT)
Dept: FAMILY MEDICINE CLINIC | Facility: CLINIC | Age: 29
End: 2019-09-05

## 2019-09-05 VITALS
BODY MASS INDEX: 19.33 KG/M2 | HEART RATE: 79 BPM | TEMPERATURE: 98.2 F | DIASTOLIC BLOOD PRESSURE: 60 MMHG | SYSTOLIC BLOOD PRESSURE: 98 MMHG | WEIGHT: 116 LBS | HEIGHT: 65 IN

## 2019-09-05 DIAGNOSIS — D69.6 DECREASED PLATELET COUNT (HCC): ICD-10-CM

## 2019-09-05 DIAGNOSIS — K81.9 CHOLECYSTITIS: Primary | ICD-10-CM

## 2019-09-05 DIAGNOSIS — D72.829 LEUKOCYTOSIS, UNSPECIFIED TYPE: ICD-10-CM

## 2019-09-05 LAB
BASOPHILS # BLD AUTO: 0.13 THOUSANDS/ΜL (ref 0–0.1)
BASOPHILS NFR BLD AUTO: 2 % (ref 0–1)
EOSINOPHIL # BLD AUTO: 0.65 THOUSAND/ΜL (ref 0–0.61)
EOSINOPHIL NFR BLD AUTO: 8 % (ref 0–6)
ERYTHROCYTE [DISTWIDTH] IN BLOOD BY AUTOMATED COUNT: 13.4 % (ref 11.6–15.1)
HCT VFR BLD AUTO: 39.4 % (ref 34.8–46.1)
HGB BLD-MCNC: 12.2 G/DL (ref 11.5–15.4)
IMM GRANULOCYTES # BLD AUTO: 0.04 THOUSAND/UL (ref 0–0.2)
IMM GRANULOCYTES NFR BLD AUTO: 1 % (ref 0–2)
LYMPHOCYTES # BLD AUTO: 2.67 THOUSANDS/ΜL (ref 0.6–4.47)
LYMPHOCYTES NFR BLD AUTO: 32 % (ref 14–44)
MCH RBC QN AUTO: 28.2 PG (ref 26.8–34.3)
MCHC RBC AUTO-ENTMCNC: 31 G/DL (ref 31.4–37.4)
MCV RBC AUTO: 91 FL (ref 82–98)
MONOCYTES # BLD AUTO: 0.47 THOUSAND/ΜL (ref 0.17–1.22)
MONOCYTES NFR BLD AUTO: 6 % (ref 4–12)
NEUTROPHILS # BLD AUTO: 4.45 THOUSANDS/ΜL (ref 1.85–7.62)
NEUTS SEG NFR BLD AUTO: 51 % (ref 43–75)
NRBC BLD AUTO-RTO: 0 /100 WBCS
PLATELET # BLD AUTO: 375 THOUSANDS/UL (ref 149–390)
PMV BLD AUTO: 12.1 FL (ref 8.9–12.7)
RBC # BLD AUTO: 4.33 MILLION/UL (ref 3.81–5.12)
WBC # BLD AUTO: 8.41 THOUSAND/UL (ref 4.31–10.16)

## 2019-09-05 PROCEDURE — 36415 COLL VENOUS BLD VENIPUNCTURE: CPT

## 2019-09-05 PROCEDURE — 99024 POSTOP FOLLOW-UP VISIT: CPT | Performed by: SURGERY

## 2019-09-05 PROCEDURE — 85025 COMPLETE CBC W/AUTO DIFF WBC: CPT

## 2019-09-05 NOTE — RESULT ENCOUNTER NOTE
Can you please let patient know that her white count is back to normal range and her platelets are also back to normal range    Thank you
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
## 2019-09-05 NOTE — TELEPHONE ENCOUNTER
----- Message from Margarita Presley MD sent at 9/5/2019  3:28 PM EDT -----  Can you please let patient know that her white count is back to normal range and her platelets are also back to normal range    Thank you

## 2019-09-10 ENCOUNTER — TELEPHONE (OUTPATIENT)
Dept: SURGERY | Facility: CLINIC | Age: 29
End: 2019-09-10

## 2019-09-10 NOTE — TELEPHONE ENCOUNTER
Patient called stating 1 incision site seems to have that glue inside and the skin seems to be rising over it  Just want to confirm this is normal   -Other incisions/ glue peeled off and site looks better  Allergist provided dermatitis cream   -Please advise

## 2019-09-11 ENCOUNTER — TELEPHONE (OUTPATIENT)
Dept: SURGERY | Facility: CLINIC | Age: 29
End: 2019-09-11

## 2019-09-11 NOTE — TELEPHONE ENCOUNTER
Patient called again  I spoke with Dr Sanchez Murrieta  Per Dr Sanchez Murrieta advised patient not to be concerned, its normal

## 2019-09-20 ENCOUNTER — OFFICE VISIT (OUTPATIENT)
Dept: SURGERY | Facility: CLINIC | Age: 29
End: 2019-09-20

## 2019-09-20 VITALS
HEIGHT: 65 IN | HEART RATE: 75 BPM | WEIGHT: 111 LBS | DIASTOLIC BLOOD PRESSURE: 64 MMHG | SYSTOLIC BLOOD PRESSURE: 98 MMHG | TEMPERATURE: 98.2 F | BODY MASS INDEX: 18.49 KG/M2

## 2019-09-20 DIAGNOSIS — K81.9 CHOLECYSTITIS: Primary | ICD-10-CM

## 2019-09-20 PROCEDURE — 99024 POSTOP FOLLOW-UP VISIT: CPT | Performed by: SURGERY
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
## 2019-10-08 DIAGNOSIS — G43.709 CHRONIC MIGRAINE WITHOUT AURA WITHOUT STATUS MIGRAINOSUS, NOT INTRACTABLE: ICD-10-CM

## 2019-10-08 RX ORDER — CYPROHEPTADINE HYDROCHLORIDE 4 MG/1
TABLET ORAL
Qty: 15 TABLET | Refills: 5 | Status: SHIPPED | OUTPATIENT
Start: 2019-10-08 | End: 2019-12-10

## 2019-10-09 ENCOUNTER — CLINICAL SUPPORT (OUTPATIENT)
Dept: NUTRITION | Facility: HOSPITAL | Age: 29
End: 2019-10-09
Payer: COMMERCIAL

## 2019-10-09 VITALS — WEIGHT: 111 LBS | HEIGHT: 66 IN | BODY MASS INDEX: 17.84 KG/M2

## 2019-10-09 DIAGNOSIS — Z00.00 PERIODIC HEALTH ASSESSMENT, GENERAL SCREENING, ADULT: ICD-10-CM

## 2019-10-09 PROCEDURE — 97802 MEDICAL NUTRITION INDIV IN: CPT

## 2019-10-09 NOTE — PATIENT INSTRUCTIONS
1  Consider increasing weight to BMI >18 5 (120lb)  2  Include 15 grm protein at breakfast, 3 oz lunch and dinner and 10 grm snacks  3  Increase portion sizes of foods you are currently eating  4  Read food labels for 3 grm Fiber or more, 10-15 grm Sugar/serving   5   Call with questions/concerns
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
## 2019-10-14 ENCOUNTER — OFFICE VISIT (OUTPATIENT)
Dept: NEUROLOGY | Facility: CLINIC | Age: 29
End: 2019-10-14
Payer: COMMERCIAL

## 2019-10-14 VITALS
DIASTOLIC BLOOD PRESSURE: 52 MMHG | HEART RATE: 81 BPM | BODY MASS INDEX: 18.19 KG/M2 | WEIGHT: 113.2 LBS | SYSTOLIC BLOOD PRESSURE: 82 MMHG | HEIGHT: 66 IN

## 2019-10-14 DIAGNOSIS — G43.709 CHRONIC MIGRAINE WITHOUT AURA WITHOUT STATUS MIGRAINOSUS, NOT INTRACTABLE: Primary | ICD-10-CM

## 2019-10-14 PROCEDURE — 99214 OFFICE O/P EST MOD 30 MIN: CPT | Performed by: NURSE PRACTITIONER

## 2019-10-14 NOTE — PATIENT INSTRUCTIONS
Patient doing well  Continue current dose of Inderal, if remains stable can consider decreasing dose  Remains on cyproheptadine  Continue with magnesium  Continue with alternative therapies  If increase in headache patient to call the office

## 2019-10-14 NOTE — PROGRESS NOTES
Review of Systems   Constitutional: Negative  HENT: Negative  Eyes: Negative  Respiratory: Negative  Cardiovascular: Negative  Gastrointestinal: Positive for nausea  Endocrine: Negative  Genitourinary: Negative  Musculoskeletal: Positive for neck pain  Skin: Negative  Allergic/Immunologic: Negative  Neurological: Positive for dizziness (Occasionally ) and headaches  Hematological: Bruises/bleeds easily  Psychiatric/Behavioral: Positive for decreased concentration and sleep disturbance  The patient is nervous/anxious
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
## 2019-12-10 ENCOUNTER — OFFICE VISIT (OUTPATIENT)
Dept: OBGYN CLINIC | Facility: CLINIC | Age: 29
End: 2019-12-10
Payer: COMMERCIAL

## 2019-12-10 VITALS
WEIGHT: 111 LBS | HEIGHT: 65 IN | SYSTOLIC BLOOD PRESSURE: 96 MMHG | DIASTOLIC BLOOD PRESSURE: 60 MMHG | BODY MASS INDEX: 18.49 KG/M2

## 2019-12-10 DIAGNOSIS — Z30.41 ORAL CONTRACEPTIVE USE: ICD-10-CM

## 2019-12-10 DIAGNOSIS — Z01.419 ENCOUNTER FOR GYNECOLOGICAL EXAMINATION (GENERAL) (ROUTINE) WITHOUT ABNORMAL FINDINGS: Primary | ICD-10-CM

## 2019-12-10 PROBLEM — Z00.00 PERIODIC HEALTH ASSESSMENT, GENERAL SCREENING, ADULT: Status: RESOLVED | Noted: 2019-04-01 | Resolved: 2019-12-10

## 2019-12-10 PROBLEM — T81.49XA SURGICAL SITE INFECTION: Status: RESOLVED | Noted: 2019-08-29 | Resolved: 2019-12-10

## 2019-12-10 PROCEDURE — S0610 ANNUAL GYNECOLOGICAL EXAMINA: HCPCS | Performed by: OBSTETRICS & GYNECOLOGY

## 2019-12-10 PROCEDURE — G0145 SCR C/V CYTO,THINLAYER,RESCR: HCPCS | Performed by: OBSTETRICS & GYNECOLOGY

## 2019-12-10 RX ORDER — ETHYNODIOL DIACETATE AND ETHINYL ESTRADIOL 1 MG-35MCG
1 KIT ORAL DAILY
Qty: 28 TABLET | Refills: 0 | Status: SHIPPED | OUTPATIENT
Start: 2019-12-10 | End: 2019-12-10 | Stop reason: SDUPTHER

## 2019-12-10 RX ORDER — UREA 10 %
1 LOTION (ML) TOPICAL
COMMUNITY
End: 2020-01-14 | Stop reason: ALTCHOICE

## 2019-12-10 RX ORDER — ETHYNODIOL DIACETATE AND ETHINYL ESTRADIOL 1 MG-35MCG
1 KIT ORAL DAILY
Qty: 84 TABLET | Refills: 3 | Status: SHIPPED | OUTPATIENT
Start: 2019-12-10 | End: 2020-09-07

## 2019-12-12 ENCOUNTER — OFFICE VISIT (OUTPATIENT)
Dept: FAMILY MEDICINE CLINIC | Facility: CLINIC | Age: 29
End: 2019-12-12
Payer: COMMERCIAL

## 2019-12-12 VITALS
WEIGHT: 112.6 LBS | BODY MASS INDEX: 19.03 KG/M2 | TEMPERATURE: 97.6 F | DIASTOLIC BLOOD PRESSURE: 74 MMHG | SYSTOLIC BLOOD PRESSURE: 92 MMHG | OXYGEN SATURATION: 98 % | HEART RATE: 78 BPM

## 2019-12-12 DIAGNOSIS — R51.9 GENERALIZED HEADACHES: ICD-10-CM

## 2019-12-12 DIAGNOSIS — J06.9 UPPER RESPIRATORY TRACT INFECTION, UNSPECIFIED TYPE: Primary | ICD-10-CM

## 2019-12-12 PROCEDURE — 99213 OFFICE O/P EST LOW 20 MIN: CPT | Performed by: FAMILY MEDICINE

## 2019-12-12 RX ORDER — PROPRANOLOL HCL 60 MG
60 CAPSULE, EXTENDED RELEASE 24HR ORAL DAILY
Qty: 30 CAPSULE | Refills: 1 | Status: SHIPPED | OUTPATIENT
Start: 2019-12-12 | End: 2020-01-03

## 2019-12-12 RX ORDER — AZITHROMYCIN 250 MG/1
TABLET, FILM COATED ORAL
Qty: 6 TABLET | Refills: 0 | Status: SHIPPED | OUTPATIENT
Start: 2019-12-12 | End: 2019-12-17
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
## 2019-12-12 NOTE — ASSESSMENT & PLAN NOTE
Migraine headaches  I had a long discussion with the patient  Her gynecologist recommended tried to decrease her propranolol to 60 mg once daily to improve her blood pressure which tends to be low  She will call if she notices increased frequency of migraine headaches    Currently she experiences 1-2 headaches per month

## 2019-12-12 NOTE — ASSESSMENT & PLAN NOTE
URI   Patient given prescription for Zithromax Z-Sreedhar take as directed for 5 days  She may take over-the-counter medications as necessary

## 2019-12-13 LAB
LAB AP GYN PRIMARY INTERPRETATION: NORMAL
LAB AP LMP: NORMAL
Lab: NORMAL

## 2019-12-26 ENCOUNTER — IMMUNIZATIONS (OUTPATIENT)
Dept: FAMILY MEDICINE CLINIC | Facility: CLINIC | Age: 29
End: 2019-12-26
Payer: COMMERCIAL

## 2019-12-26 DIAGNOSIS — Z23 FLU VACCINE NEED: Primary | ICD-10-CM

## 2019-12-26 PROCEDURE — 90471 IMMUNIZATION ADMIN: CPT

## 2019-12-26 PROCEDURE — 90682 RIV4 VACC RECOMBINANT DNA IM: CPT

## 2020-01-02 ENCOUNTER — CLINICAL SUPPORT (OUTPATIENT)
Dept: FAMILY MEDICINE CLINIC | Facility: CLINIC | Age: 30
End: 2020-01-02
Payer: COMMERCIAL

## 2020-01-02 DIAGNOSIS — Z23 ENCOUNTER FOR IMMUNIZATION: Primary | ICD-10-CM

## 2020-01-02 PROCEDURE — 90471 IMMUNIZATION ADMIN: CPT

## 2020-01-02 PROCEDURE — 90651 9VHPV VACCINE 2/3 DOSE IM: CPT

## 2020-01-03 DIAGNOSIS — R51.9 GENERALIZED HEADACHES: ICD-10-CM

## 2020-01-03 RX ORDER — PROPRANOLOL HCL 60 MG
CAPSULE, EXTENDED RELEASE 24HR ORAL
Qty: 30 CAPSULE | Refills: 5 | Status: SHIPPED | OUTPATIENT
Start: 2020-01-03 | End: 2020-01-05 | Stop reason: SDUPTHER

## 2020-01-05 DIAGNOSIS — R51.9 GENERALIZED HEADACHES: ICD-10-CM

## 2020-01-06 RX ORDER — PROPRANOLOL HCL 60 MG
60 CAPSULE, EXTENDED RELEASE 24HR ORAL DAILY
Qty: 30 CAPSULE | Refills: 5 | Status: SHIPPED | OUTPATIENT
Start: 2020-01-06 | End: 2020-07-13

## 2020-01-14 ENCOUNTER — OFFICE VISIT (OUTPATIENT)
Dept: FAMILY MEDICINE CLINIC | Facility: CLINIC | Age: 30
End: 2020-01-14
Payer: COMMERCIAL

## 2020-01-14 VITALS
TEMPERATURE: 98 F | RESPIRATION RATE: 16 BRPM | WEIGHT: 112.2 LBS | SYSTOLIC BLOOD PRESSURE: 90 MMHG | OXYGEN SATURATION: 99 % | HEIGHT: 65 IN | DIASTOLIC BLOOD PRESSURE: 60 MMHG | HEART RATE: 88 BPM | BODY MASS INDEX: 18.69 KG/M2

## 2020-01-14 DIAGNOSIS — F41.9 ANXIETY: Primary | ICD-10-CM

## 2020-01-14 PROCEDURE — 99213 OFFICE O/P EST LOW 20 MIN: CPT | Performed by: NURSE PRACTITIONER
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
## 2020-01-29 ENCOUNTER — OFFICE VISIT (OUTPATIENT)
Dept: FAMILY MEDICINE CLINIC | Facility: CLINIC | Age: 30
End: 2020-01-29
Payer: COMMERCIAL

## 2020-01-29 VITALS
HEIGHT: 65 IN | RESPIRATION RATE: 16 BRPM | TEMPERATURE: 97.4 F | DIASTOLIC BLOOD PRESSURE: 68 MMHG | WEIGHT: 115 LBS | HEART RATE: 75 BPM | SYSTOLIC BLOOD PRESSURE: 88 MMHG | OXYGEN SATURATION: 98 % | BODY MASS INDEX: 19.16 KG/M2

## 2020-01-29 DIAGNOSIS — F41.9 ANXIETY: Primary | ICD-10-CM

## 2020-01-29 PROBLEM — J06.9 UPPER RESPIRATORY TRACT INFECTION: Status: RESOLVED | Noted: 2019-12-12 | Resolved: 2020-01-29

## 2020-01-29 PROBLEM — S16.1XXA NECK STRAIN: Status: RESOLVED | Noted: 2018-08-31 | Resolved: 2020-01-29

## 2020-01-29 PROCEDURE — 3008F BODY MASS INDEX DOCD: CPT | Performed by: FAMILY MEDICINE

## 2020-01-29 PROCEDURE — 1036F TOBACCO NON-USER: CPT | Performed by: FAMILY MEDICINE

## 2020-01-29 PROCEDURE — 99212 OFFICE O/P EST SF 10 MIN: CPT | Performed by: FAMILY MEDICINE

## 2020-01-29 NOTE — ASSESSMENT & PLAN NOTE
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
## 2020-03-02 ENCOUNTER — CLINICAL SUPPORT (OUTPATIENT)
Dept: FAMILY MEDICINE CLINIC | Facility: CLINIC | Age: 30
End: 2020-03-02
Payer: COMMERCIAL

## 2020-03-02 DIAGNOSIS — Z23 ENCOUNTER FOR IMMUNIZATION: Primary | ICD-10-CM

## 2020-03-02 PROCEDURE — 90651 9VHPV VACCINE 2/3 DOSE IM: CPT

## 2020-03-02 PROCEDURE — 90471 IMMUNIZATION ADMIN: CPT

## 2020-04-20 ENCOUNTER — TELEPHONE (OUTPATIENT)
Dept: OBGYN CLINIC | Facility: CLINIC | Age: 30
End: 2020-04-20

## 2020-07-08 ENCOUNTER — OFFICE VISIT (OUTPATIENT)
Dept: FAMILY MEDICINE CLINIC | Facility: CLINIC | Age: 30
End: 2020-07-08
Payer: COMMERCIAL

## 2020-07-08 VITALS
BODY MASS INDEX: 18.76 KG/M2 | RESPIRATION RATE: 16 BRPM | WEIGHT: 112.6 LBS | DIASTOLIC BLOOD PRESSURE: 72 MMHG | TEMPERATURE: 99.1 F | OXYGEN SATURATION: 95 % | HEART RATE: 82 BPM | HEIGHT: 65 IN | SYSTOLIC BLOOD PRESSURE: 92 MMHG

## 2020-07-08 DIAGNOSIS — K86.1 CHRONIC PANCREATITIS, UNSPECIFIED PANCREATITIS TYPE (HCC): Primary | ICD-10-CM

## 2020-07-08 DIAGNOSIS — Z23 ENCOUNTER FOR IMMUNIZATION: ICD-10-CM

## 2020-07-08 PROCEDURE — 1036F TOBACCO NON-USER: CPT | Performed by: FAMILY MEDICINE

## 2020-07-08 PROCEDURE — 90471 IMMUNIZATION ADMIN: CPT

## 2020-07-08 PROCEDURE — 90651 9VHPV VACCINE 2/3 DOSE IM: CPT

## 2020-07-08 PROCEDURE — 3008F BODY MASS INDEX DOCD: CPT | Performed by: FAMILY MEDICINE

## 2020-07-08 PROCEDURE — 99214 OFFICE O/P EST MOD 30 MIN: CPT | Performed by: FAMILY MEDICINE

## 2020-07-08 RX ORDER — LORATADINE 10 MG/1
TABLET ORAL
COMMUNITY
Start: 2020-06-26

## 2020-07-08 NOTE — ASSESSMENT & PLAN NOTE
Pancreatitis  The patient will obtain blood work as ordered for further evaluation  She will also obtain CT scan of abdomen with contrast for further evaluation    She was also given referral to [facility] for further consultation regarding her chronic intermittent symptoms

## 2020-07-12 DIAGNOSIS — R51.9 GENERALIZED HEADACHES: ICD-10-CM

## 2020-07-13 DIAGNOSIS — R51.9 GENERALIZED HEADACHES: ICD-10-CM

## 2020-07-13 RX ORDER — PROPRANOLOL HCL 60 MG
60 CAPSULE, EXTENDED RELEASE 24HR ORAL DAILY
Qty: 90 CAPSULE | Refills: 3 | Status: SHIPPED | OUTPATIENT
Start: 2020-07-13 | End: 2020-10-19 | Stop reason: SDUPTHER

## 2020-07-13 RX ORDER — PROPRANOLOL HCL 60 MG
60 CAPSULE, EXTENDED RELEASE 24HR ORAL DAILY
Qty: 30 CAPSULE | Refills: 0 | OUTPATIENT
Start: 2020-07-13

## 2020-07-13 RX ORDER — PROPRANOLOL HCL 60 MG
CAPSULE, EXTENDED RELEASE 24HR ORAL
Qty: 90 CAPSULE | Refills: 3 | Status: SHIPPED | OUTPATIENT
Start: 2020-07-13 | End: 2020-07-13 | Stop reason: SDUPTHER

## 2020-07-16 ENCOUNTER — TELEPHONE (OUTPATIENT)
Dept: FAMILY MEDICINE CLINIC | Facility: CLINIC | Age: 30
End: 2020-07-16

## 2020-07-16 ENCOUNTER — APPOINTMENT (OUTPATIENT)
Dept: LAB | Facility: HOSPITAL | Age: 30
End: 2020-07-16
Payer: COMMERCIAL

## 2020-07-16 DIAGNOSIS — K86.1 CHRONIC PANCREATITIS, UNSPECIFIED PANCREATITIS TYPE (HCC): ICD-10-CM

## 2020-07-16 LAB
ALBUMIN SERPL BCP-MCNC: 4 G/DL (ref 3.5–5)
ALP SERPL-CCNC: 60 U/L (ref 46–116)
ALT SERPL W P-5'-P-CCNC: 27 U/L (ref 12–78)
ANION GAP SERPL CALCULATED.3IONS-SCNC: 3 MMOL/L (ref 4–13)
AST SERPL W P-5'-P-CCNC: 15 U/L (ref 5–45)
BILIRUB SERPL-MCNC: 0.56 MG/DL (ref 0.2–1)
BUN SERPL-MCNC: 12 MG/DL (ref 5–25)
CALCIUM SERPL-MCNC: 9.9 MG/DL (ref 8.3–10.1)
CHLORIDE SERPL-SCNC: 106 MMOL/L (ref 100–108)
CO2 SERPL-SCNC: 29 MMOL/L (ref 21–32)
CREAT SERPL-MCNC: 0.83 MG/DL (ref 0.6–1.3)
CRP SERPL QL: <3 MG/L
ERYTHROCYTE [DISTWIDTH] IN BLOOD BY AUTOMATED COUNT: 13.2 % (ref 11.6–15.1)
GFR SERPL CREATININE-BSD FRML MDRD: 95 ML/MIN/1.73SQ M
GLUCOSE P FAST SERPL-MCNC: 78 MG/DL (ref 65–99)
HCT VFR BLD AUTO: 44.4 % (ref 34.8–46.1)
HGB BLD-MCNC: 14.4 G/DL (ref 11.5–15.4)
LIPASE SERPL-CCNC: 96 U/L (ref 73–393)
MCH RBC QN AUTO: 29.1 PG (ref 26.8–34.3)
MCHC RBC AUTO-ENTMCNC: 32.4 G/DL (ref 31.4–37.4)
MCV RBC AUTO: 90 FL (ref 82–98)
PLATELET # BLD AUTO: 218 THOUSANDS/UL (ref 149–390)
PMV BLD AUTO: 11.8 FL (ref 8.9–12.7)
POTASSIUM SERPL-SCNC: 4.9 MMOL/L (ref 3.5–5.3)
PROT SERPL-MCNC: 7.8 G/DL (ref 6.4–8.2)
RBC # BLD AUTO: 4.94 MILLION/UL (ref 3.81–5.12)
SODIUM SERPL-SCNC: 138 MMOL/L (ref 136–145)
WBC # BLD AUTO: 6.67 THOUSAND/UL (ref 4.31–10.16)

## 2020-07-16 PROCEDURE — 83690 ASSAY OF LIPASE: CPT

## 2020-07-16 PROCEDURE — 85027 COMPLETE CBC AUTOMATED: CPT

## 2020-07-16 PROCEDURE — 36415 COLL VENOUS BLD VENIPUNCTURE: CPT

## 2020-07-16 PROCEDURE — 86140 C-REACTIVE PROTEIN: CPT

## 2020-07-16 PROCEDURE — 80053 COMPREHEN METABOLIC PANEL: CPT

## 2020-07-16 NOTE — TELEPHONE ENCOUNTER
----- Message from Brandy Thomason MD sent at 5/42/3656  9:15 AM EDT -----  All recent blood work stable for patient  No signs of pancreatitis by blood work    Check CT scan of abdomen as discussed

## 2020-07-23 ENCOUNTER — HOSPITAL ENCOUNTER (OUTPATIENT)
Dept: RADIOLOGY | Facility: HOSPITAL | Age: 30
Discharge: HOME | End: 2020-07-23
Payer: COMMERCIAL

## 2020-07-23 DIAGNOSIS — K86.1 CHRONIC PANCREATITIS, UNSPECIFIED PANCREATITIS TYPE (HCC): ICD-10-CM

## 2020-07-23 PROCEDURE — 74160 CT ABDOMEN W/CONTRAST: CPT

## 2020-07-23 RX ADMIN — IOHEXOL 100 ML: 350 INJECTION, SOLUTION INTRAVENOUS at 08:04

## 2020-07-29 ENCOUNTER — TELEPHONE (OUTPATIENT)
Dept: FAMILY MEDICINE CLINIC | Facility: CLINIC | Age: 30
End: 2020-07-29

## 2020-07-29 NOTE — TELEPHONE ENCOUNTER
----- Message from Cristina Segal MD sent at 9/06/4757 10:36 AM EDT -----  Recent CT scan did not show evidence of pancreatitis or any bone abnormality    If she continues with discomfort she may have to follow-up with her gastroenterologist for further consultation

## 2020-08-07 ENCOUNTER — OFFICE VISIT (OUTPATIENT)
Dept: GASTROENTEROLOGY | Facility: CLINIC | Age: 30
End: 2020-08-07
Payer: COMMERCIAL

## 2020-08-07 ENCOUNTER — TELEPHONE (OUTPATIENT)
Dept: OBGYN CLINIC | Facility: CLINIC | Age: 30
End: 2020-08-07

## 2020-08-07 VITALS
HEART RATE: 81 BPM | HEIGHT: 64 IN | SYSTOLIC BLOOD PRESSURE: 95 MMHG | DIASTOLIC BLOOD PRESSURE: 63 MMHG | WEIGHT: 112 LBS | BODY MASS INDEX: 19.12 KG/M2 | TEMPERATURE: 97.6 F

## 2020-08-07 DIAGNOSIS — K86.1 CHRONIC PANCREATITIS, UNSPECIFIED PANCREATITIS TYPE (HCC): ICD-10-CM

## 2020-08-07 DIAGNOSIS — K76.9 LIVER LESION, RIGHT LOBE: Primary | ICD-10-CM

## 2020-08-07 PROCEDURE — 99244 OFF/OP CNSLTJ NEW/EST MOD 40: CPT | Performed by: INTERNAL MEDICINE

## 2020-08-07 PROCEDURE — 1036F TOBACCO NON-USER: CPT | Performed by: INTERNAL MEDICINE

## 2020-08-07 PROCEDURE — 3008F BODY MASS INDEX DOCD: CPT | Performed by: INTERNAL MEDICINE

## 2020-08-07 NOTE — TELEPHONE ENCOUNTER
----- Message from Ирина [facility] Sawyer Spicer sent at 8/7/2020 11:52 AM EDT -----  Regarding: Prescription Question  Contact: [phone]  Hi Dr Tracy Lee,    I had a CT scan done recently to check for pancreatitis due to back pain I have been having  Last year I had my gallbladder removed due to gallstones and pancreatic tissue growing on my gallbladder  The CT scan found more than 10 liver lesions  One was about 1 5 cm  It is small and we are catching it early, but liver is enlarged  At this time we think it is a hepatic adenoma, which if it grows it has the capability to spontaneously rupture or turn into cancer  A peripheral washout sign was found on one of the lesions  I know that oral birth control pills, especially those with hormones in them, play a role in the formation of these liver lesions  I discussed with Dr Donna Barr that it would be best to come off my birth control pill right now to see if the lesion shrinks at all  I wanted to let you know that I am going to stop taking my pill  Tomorrow would have been my last pill for this pack anyway  I cancelled my automatic refill  I did have another pack left, but I just won't start a new one  If we weren't in the middle of a pandemic, I would have scheduled an appointment with you to discuss my options for regulating my period  I am not comfortable with IUD's  Dr Donna Barr said I might be able to discuss with you an non-hormone contraceptive, but I think I'm afraid to try pills now  My periods without being on the pill are unregulated  I often don't know when they are coming, sometimes they last longer than a week, the bleeding is heavy, and the cramps are painful  Do you think it's better to wait until December when I have my yearly appointment with you to discuss other options for regulating my period?
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
## 2020-08-10 ENCOUNTER — APPOINTMENT (OUTPATIENT)
Dept: LAB | Facility: CLINIC | Age: 30
End: 2020-08-10
Payer: COMMERCIAL

## 2020-08-10 DIAGNOSIS — K76.9 LIVER LESION, RIGHT LOBE: ICD-10-CM

## 2020-08-10 DIAGNOSIS — K86.1 CHRONIC PANCREATITIS, UNSPECIFIED PANCREATITIS TYPE (HCC): ICD-10-CM

## 2020-08-10 LAB
AFP-TM SERPL-MCNC: 52.2 NG/ML (ref 0.5–8)
ALBUMIN SERPL BCP-MCNC: 3.6 G/DL (ref 3.5–5)
ALP SERPL-CCNC: 59 U/L (ref 46–116)
ALT SERPL W P-5'-P-CCNC: 26 U/L (ref 12–78)
AMYLASE SERPL-CCNC: 81 IU/L (ref 25–115)
ANION GAP SERPL CALCULATED.3IONS-SCNC: 5 MMOL/L (ref 4–13)
AST SERPL W P-5'-P-CCNC: 14 U/L (ref 5–45)
BASOPHILS # BLD AUTO: 0.06 THOUSANDS/ΜL (ref 0–0.1)
BASOPHILS NFR BLD AUTO: 1 % (ref 0–1)
BILIRUB SERPL-MCNC: 0.44 MG/DL (ref 0.2–1)
BUN SERPL-MCNC: 12 MG/DL (ref 5–25)
CALCIUM SERPL-MCNC: 9.1 MG/DL (ref 8.3–10.1)
CHLORIDE SERPL-SCNC: 108 MMOL/L (ref 100–108)
CO2 SERPL-SCNC: 26 MMOL/L (ref 21–32)
CREAT SERPL-MCNC: 0.77 MG/DL (ref 0.6–1.3)
EOSINOPHIL # BLD AUTO: 0.11 THOUSAND/ΜL (ref 0–0.61)
EOSINOPHIL NFR BLD AUTO: 2 % (ref 0–6)
ERYTHROCYTE [DISTWIDTH] IN BLOOD BY AUTOMATED COUNT: 13.2 % (ref 11.6–15.1)
GFR SERPL CREATININE-BSD FRML MDRD: 104 ML/MIN/1.73SQ M
GGT SERPL-CCNC: 35 U/L (ref 5–85)
GLUCOSE P FAST SERPL-MCNC: 81 MG/DL (ref 65–99)
HCT VFR BLD AUTO: 41.7 % (ref 34.8–46.1)
HGB BLD-MCNC: 13.3 G/DL (ref 11.5–15.4)
IMM GRANULOCYTES # BLD AUTO: 0.01 THOUSAND/UL (ref 0–0.2)
IMM GRANULOCYTES NFR BLD AUTO: 0 % (ref 0–2)
LIPASE SERPL-CCNC: 111 U/L (ref 73–393)
LYMPHOCYTES # BLD AUTO: 2.25 THOUSANDS/ΜL (ref 0.6–4.47)
LYMPHOCYTES NFR BLD AUTO: 35 % (ref 14–44)
MCH RBC QN AUTO: 29 PG (ref 26.8–34.3)
MCHC RBC AUTO-ENTMCNC: 31.9 G/DL (ref 31.4–37.4)
MCV RBC AUTO: 91 FL (ref 82–98)
MONOCYTES # BLD AUTO: 0.52 THOUSAND/ΜL (ref 0.17–1.22)
MONOCYTES NFR BLD AUTO: 8 % (ref 4–12)
NEUTROPHILS # BLD AUTO: 3.41 THOUSANDS/ΜL (ref 1.85–7.62)
NEUTS SEG NFR BLD AUTO: 54 % (ref 43–75)
NRBC BLD AUTO-RTO: 0 /100 WBCS
PLATELET # BLD AUTO: 234 THOUSANDS/UL (ref 149–390)
PMV BLD AUTO: 12.3 FL (ref 8.9–12.7)
POTASSIUM SERPL-SCNC: 4.3 MMOL/L (ref 3.5–5.3)
PROT SERPL-MCNC: 7.6 G/DL (ref 6.4–8.2)
RBC # BLD AUTO: 4.58 MILLION/UL (ref 3.81–5.12)
SODIUM SERPL-SCNC: 139 MMOL/L (ref 136–145)
WBC # BLD AUTO: 6.36 THOUSAND/UL (ref 4.31–10.16)

## 2020-08-10 PROCEDURE — 36415 COLL VENOUS BLD VENIPUNCTURE: CPT

## 2020-08-10 PROCEDURE — 83690 ASSAY OF LIPASE: CPT

## 2020-08-10 PROCEDURE — 82105 ALPHA-FETOPROTEIN SERUM: CPT

## 2020-08-10 PROCEDURE — 80053 COMPREHEN METABOLIC PANEL: CPT

## 2020-08-10 PROCEDURE — 82977 ASSAY OF GGT: CPT

## 2020-08-10 PROCEDURE — 85025 COMPLETE CBC W/AUTO DIFF WBC: CPT

## 2020-08-10 PROCEDURE — 82656 EL-1 FECAL QUAL/SEMIQ: CPT

## 2020-08-10 PROCEDURE — 82150 ASSAY OF AMYLASE: CPT

## 2020-08-12 ENCOUNTER — TELEPHONE (OUTPATIENT)
Dept: GASTROENTEROLOGY | Facility: CLINIC | Age: 30
End: 2020-08-12

## 2020-08-12 NOTE — TELEPHONE ENCOUNTER
I spoke to Moni Storey about blood work results and answered her questions  Will - Moni Storey will be losing her health insurance in 2 weeks  Can we call MRI to see if she can get in sooner?

## 2020-08-12 NOTE — TELEPHONE ENCOUNTER
Patients GI provider:  Dr Christ Reddy    Number to return call: [phone]    Reason for call: Pt calling stating she received her tumor marker results in [facility] and would like to go speak to someone regarding the results  Pt also wanted to know if we can have her MRI appt moved up to a sooner date?     Scheduled procedure/appointment date if applicable: NA

## 2020-08-13 NOTE — TELEPHONE ENCOUNTER
Auth started for patient through [phone] number [phone]  Will call patient to update her since an auth has to be done before I can reschedule patient at a sooner date

## 2020-08-14 LAB — ELASTASE PANC STL-MCNT: >500 UG ELAST./G

## 2020-08-18 ENCOUNTER — TELEPHONE (OUTPATIENT)
Dept: GASTROENTEROLOGY | Facility: CLINIC | Age: 30
End: 2020-08-18

## 2020-08-30 ENCOUNTER — HOSPITAL ENCOUNTER (OUTPATIENT)
Dept: RADIOLOGY | Facility: HOSPITAL | Age: 30
Discharge: HOME | End: 2020-08-30
Attending: INTERNAL MEDICINE
Payer: COMMERCIAL

## 2020-08-30 DIAGNOSIS — K86.1 CHRONIC PANCREATITIS, UNSPECIFIED PANCREATITIS TYPE (HCC): ICD-10-CM

## 2020-08-30 DIAGNOSIS — K76.9 LIVER LESION, RIGHT LOBE: ICD-10-CM

## 2020-08-30 PROCEDURE — G1004 CDSM NDSC: HCPCS

## 2020-08-30 PROCEDURE — 74183 MRI ABD W/O CNTR FLWD CNTR: CPT

## 2020-08-30 PROCEDURE — A9585 GADOBUTROL INJECTION: HCPCS | Performed by: INTERNAL MEDICINE

## 2020-08-30 RX ADMIN — GADOBUTROL 5 ML: 604.72 INJECTION INTRAVENOUS at 12:30

## 2020-09-02 ENCOUNTER — TELEPHONE (OUTPATIENT)
Dept: GASTROENTEROLOGY | Facility: AMBULARY SURGERY CENTER | Age: 30
End: 2020-09-02

## 2020-09-02 NOTE — TELEPHONE ENCOUNTER
MRI has not yet been read by the radiologist, please inform patient we will await the radiologists read  If need be, please can call radiology for faster reading

## 2020-09-02 NOTE — TELEPHONE ENCOUNTER
Patients GI provider:  Dr Lang Stamp    Number to return call: [phone]    Reason for call: Pt calling to get her MRI results       Scheduled procedure/appointment date if applicable: Apt/procedure 9/4/20

## 2020-09-03 ENCOUNTER — TELEMEDICINE (OUTPATIENT)
Dept: OBGYN CLINIC | Facility: CLINIC | Age: 30
End: 2020-09-03
Payer: COMMERCIAL

## 2020-09-03 ENCOUNTER — TELEPHONE (OUTPATIENT)
Dept: GASTROENTEROLOGY | Facility: AMBULARY SURGERY CENTER | Age: 30
End: 2020-09-03

## 2020-09-03 DIAGNOSIS — Z30.09 GENERAL COUNSELING AND ADVICE ON CONTRACEPTIVE MANAGEMENT: Primary | ICD-10-CM

## 2020-09-03 DIAGNOSIS — K76.9 LIVER LESION: ICD-10-CM

## 2020-09-03 PROCEDURE — 1036F TOBACCO NON-USER: CPT | Performed by: OBSTETRICS & GYNECOLOGY

## 2020-09-03 PROCEDURE — 99213 OFFICE O/P EST LOW 20 MIN: CPT | Performed by: OBSTETRICS & GYNECOLOGY

## 2020-09-03 NOTE — TELEPHONE ENCOUNTER
Patients GI provider:  Dr Carin Ko    Number to return call: (    Reason for call: [facility] radiology  calling re significant findings on MRI of abd    High priority task sent to provider pool as well as urgent tiger text sent to [place] on call  MRN [id number]   1990      Scheduled procedure/appointment date if applicable:20

## 2020-09-04 ENCOUNTER — OFFICE VISIT (OUTPATIENT)
Dept: GASTROENTEROLOGY | Facility: CLINIC | Age: 30
End: 2020-09-04
Payer: COMMERCIAL

## 2020-09-04 VITALS
DIASTOLIC BLOOD PRESSURE: 72 MMHG | WEIGHT: 109 LBS | HEART RATE: 87 BPM | SYSTOLIC BLOOD PRESSURE: 105 MMHG | TEMPERATURE: 96.8 F | BODY MASS INDEX: 18.71 KG/M2

## 2020-09-04 DIAGNOSIS — R53.1 WEAKNESS: Primary | ICD-10-CM

## 2020-09-04 PROCEDURE — 99214 OFFICE O/P EST MOD 30 MIN: CPT | Performed by: INTERNAL MEDICINE
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
## 2020-09-09 ENCOUNTER — APPOINTMENT (OUTPATIENT)
Dept: LAB | Facility: CLINIC | Age: 30
End: 2020-09-09
Payer: COMMERCIAL

## 2020-09-09 DIAGNOSIS — R53.1 WEAKNESS: ICD-10-CM

## 2020-09-09 PROCEDURE — 36415 COLL VENOUS BLD VENIPUNCTURE: CPT

## 2020-09-21 ENCOUNTER — TELEPHONE (OUTPATIENT)
Dept: OBGYN CLINIC | Facility: CLINIC | Age: 30
End: 2020-09-21

## 2020-09-21 DIAGNOSIS — Z30.9 ENCOUNTER FOR CONTRACEPTIVE MANAGEMENT, UNSPECIFIED TYPE: Primary | ICD-10-CM

## 2020-09-21 NOTE — TELEPHONE ENCOUNTER
----- Message from Ирина Barr sent at 9/21/2020  3:00 PM EDT -----  Regarding: Visit Follow-Up Question  Contact: [phone]  Hi Dr Farhat Uriarte,    I'm sorry I took so long to get back to you about my birth control options  I am kind of stuck between doing nothing (staying off the pill) and trying a mini pill  So far I still have not gotten my period  This is kind of unusual for me because usually if I'm not taking my pill I bleed more often and for a longer period of time  My cramps are usually worse  It's been 35 days since my last period and I'm not active  I don't like the unpredictability of not being on the pill, but the mini pill can be unpredictable too until you don't get your period anymore  It was a long time ago, but I did try Microconor and Norethindrone and I feel like I had issues on both  I can't remember if it was weight gain or break through bleeding  If it was the latter, it could be because I didn't take it at the same time every day  I also feel like it's better for me to try something now before my next MRI though  Here are my questions  If it is better that I schedule another virtual appointment to discuss the questions, someone from your office can call me to schedule an appointment  1) Are there any major/common health issues that arise after taking the mini pill? 2) Is weight gain common in women who take the mini pill  That's kind of a deal breaker for me with some of my other options       Thank you,     Antonio Crain

## 2020-09-24 ENCOUNTER — TELEPHONE (OUTPATIENT)
Dept: FAMILY MEDICINE CLINIC | Facility: CLINIC | Age: 30
End: 2020-09-24

## 2020-09-24 ENCOUNTER — TELEPHONE (OUTPATIENT)
Dept: GASTROENTEROLOGY | Facility: CLINIC | Age: 30
End: 2020-09-24

## 2020-09-24 DIAGNOSIS — R53.83 OTHER FATIGUE: Primary | ICD-10-CM

## 2020-09-24 NOTE — TELEPHONE ENCOUNTER
I do not see any thyroid lab results  I see the scanned order in media, but I do not see results  Did she go to an outside lab? Maybe the lab hasn't faxed the results yet

## 2020-09-24 NOTE — TELEPHONE ENCOUNTER
Typically biggest issue with mini-pill can be break through bleeding if not taking it same time daily, as you likely experienced before  Otherwise generally tolerated well with minimal risks  Weight gain is not proven to be caused by the mini pill  It is known to be caused by Depo Provera injections

## 2020-09-24 NOTE — TELEPHONE ENCOUNTER
Florence Hagen,    Speaking with [facility] she asked if you recommend geting an autoimmune panel done?     Thanks,  Will

## 2020-09-24 NOTE — TELEPHONE ENCOUNTER
Please call patient  A response or her question I do not see results of thyroid test in epic as well  She would have to call ordering office to obtain results  Is unlikely her insurance would cover all the blood tests involved in an autoimmune panel due to her current symptoms  We could do some of the more common screening tests for autoimmune since it is more likely to be covered by insurance and if these tests are negative it is very unlikely she has an underlying condition    I will print out prescription    She may receive her annual flu vaccine any time between the months of September and November

## 2020-09-24 NOTE — TELEPHONE ENCOUNTER
Patients GI provider:  Dr Alexis Garcia    Number to return call: [phone]    Reason for call: Pt calling looking for her thyroid lab results   Please call pt with update    Scheduled procedure/appointment date if applicable: NA

## 2020-09-24 NOTE — TELEPHONE ENCOUNTER
I called the [facility] lab, connected to [phone]  They stated that thyroid test was cancelled by person drawing blood  She stated that this will be forwarded to the manager of that lab  Also that it will need a re draw but that they will reach out to patient

## 2020-09-24 NOTE — TELEPHONE ENCOUNTER
In regards to prev note  3 days ago 9/21,  (see prev notes),  Pt is waiting to see what can be done,  pls reach out to pt,  Thanks

## 2020-09-25 RX ORDER — NORETHINDRONE 0.35 MG/1
TABLET ORAL
Qty: 84 TABLET | Refills: 1 | Status: SHIPPED | OUTPATIENT
Start: 2020-09-25 | End: 2020-12-22 | Stop reason: SDUPTHER

## 2020-09-25 NOTE — TELEPHONE ENCOUNTER
Spoke with patient and advised as directed  Pt stated she would like to switch to the mini pill, but she wanted provider to know that she had tried micronor and plain norethidrone in the past and they gave her issues  Pt advised to make next yearly appt for December since coming due  Pt requested medication be sent to [address] on file as she trials medication

## 2020-09-25 NOTE — TELEPHONE ENCOUNTER
Patient called back and is aware that the test was not done, she is in touch of the ordering office and her doctor will take care of the autoimmune panel    She will proceed with the flu shot

## 2020-09-25 NOTE — TELEPHONE ENCOUNTER
Can someone call in rx to her pharmacy, would like to dispense name brand:  Tea 1 tab daily PO, rx: 84 refill: 1      (Was unable to pull this up to send name brand)

## 2020-09-30 NOTE — TELEPHONE ENCOUNTER
Patients GI provider:  Dr Ni Arcos    Number to return call: [phone]    Reason for call: Pt calling requesting to speak with you regarding her lab work    Scheduled procedure/appointment date if applicable: Apt/procedure n/a

## 2020-10-01 ENCOUNTER — TRANSCRIBE ORDERS (OUTPATIENT)
Dept: LAB | Facility: CLINIC | Age: 30
End: 2020-10-01

## 2020-10-01 DIAGNOSIS — R53.1 WEAKNESS: Primary | ICD-10-CM

## 2020-10-01 NOTE — TELEPHONE ENCOUNTER
Hello,  I called patient back, she stated that the  lab never reached out to her regarding re draw for her thyroid level  Is it possible to have that re ordered? She also requested a Autoimmune panel if possible? I advised her that I will update her with information  Thanks again!

## 2020-10-09 ENCOUNTER — APPOINTMENT (OUTPATIENT)
Dept: LAB | Facility: CLINIC | Age: 30
End: 2020-10-09
Payer: COMMERCIAL

## 2020-10-09 DIAGNOSIS — R53.83 OTHER FATIGUE: ICD-10-CM

## 2020-10-09 DIAGNOSIS — R53.1 WEAKNESS: ICD-10-CM

## 2020-10-09 LAB
CRP SERPL QL: <3 MG/L
ERYTHROCYTE [SEDIMENTATION RATE] IN BLOOD: 6 MM/HOUR (ref 0–19)
TSH SERPL DL<=0.05 MIU/L-ACNC: 1.71 UIU/ML (ref 0.36–3.74)

## 2020-10-09 PROCEDURE — 85652 RBC SED RATE AUTOMATED: CPT

## 2020-10-09 PROCEDURE — 84443 ASSAY THYROID STIM HORMONE: CPT

## 2020-10-09 PROCEDURE — 36415 COLL VENOUS BLD VENIPUNCTURE: CPT

## 2020-10-09 PROCEDURE — 86140 C-REACTIVE PROTEIN: CPT

## 2020-10-09 PROCEDURE — 86038 ANTINUCLEAR ANTIBODIES: CPT

## 2020-10-12 LAB — RYE IGE QN: NEGATIVE

## 2020-10-14 ENCOUNTER — TELEPHONE (OUTPATIENT)
Dept: FAMILY MEDICINE CLINIC | Facility: CLINIC | Age: 30
End: 2020-10-14

## 2020-10-19 DIAGNOSIS — R51.9 GENERALIZED HEADACHES: ICD-10-CM

## 2020-10-19 RX ORDER — PROPRANOLOL HCL 60 MG
60 CAPSULE, EXTENDED RELEASE 24HR ORAL DAILY
Qty: 90 CAPSULE | Refills: 3 | Status: SHIPPED | OUTPATIENT
Start: 2020-10-19 | End: 2021-01-14 | Stop reason: SDUPTHER

## 2020-10-26 ENCOUNTER — TELEMEDICINE (OUTPATIENT)
Dept: NEUROLOGY | Facility: CLINIC | Age: 30
End: 2020-10-26
Payer: COMMERCIAL

## 2020-10-26 DIAGNOSIS — G43.709 CHRONIC MIGRAINE WITHOUT AURA WITHOUT STATUS MIGRAINOSUS, NOT INTRACTABLE: Primary | ICD-10-CM

## 2020-10-26 PROCEDURE — 99213 OFFICE O/P EST LOW 20 MIN: CPT | Performed by: NURSE PRACTITIONER

## 2020-11-03 ENCOUNTER — OFFICE VISIT (OUTPATIENT)
Dept: FAMILY MEDICINE CLINIC | Facility: CLINIC | Age: 30
End: 2020-11-03
Payer: COMMERCIAL

## 2020-11-03 VITALS
BODY MASS INDEX: 19.36 KG/M2 | HEART RATE: 80 BPM | WEIGHT: 113.4 LBS | RESPIRATION RATE: 16 BRPM | OXYGEN SATURATION: 100 % | HEIGHT: 64 IN | TEMPERATURE: 97.5 F | DIASTOLIC BLOOD PRESSURE: 60 MMHG | SYSTOLIC BLOOD PRESSURE: 98 MMHG

## 2020-11-03 DIAGNOSIS — Z11.1 SCREENING FOR TUBERCULOSIS: ICD-10-CM

## 2020-11-03 DIAGNOSIS — Z00.00 WELL ADULT EXAM: ICD-10-CM

## 2020-11-03 DIAGNOSIS — R53.83 OTHER FATIGUE: ICD-10-CM

## 2020-11-03 DIAGNOSIS — Z23 NEED FOR INFLUENZA VACCINATION: Primary | ICD-10-CM

## 2020-11-03 PROCEDURE — 90471 IMMUNIZATION ADMIN: CPT

## 2020-11-03 PROCEDURE — 3008F BODY MASS INDEX DOCD: CPT | Performed by: FAMILY MEDICINE

## 2020-11-03 PROCEDURE — 1036F TOBACCO NON-USER: CPT | Performed by: FAMILY MEDICINE

## 2020-11-03 PROCEDURE — 3725F SCREEN DEPRESSION PERFORMED: CPT | Performed by: FAMILY MEDICINE

## 2020-11-03 PROCEDURE — 90686 IIV4 VACC NO PRSV 0.5 ML IM: CPT

## 2020-11-03 PROCEDURE — 99395 PREV VISIT EST AGE 18-39: CPT | Performed by: FAMILY MEDICINE

## 2020-11-03 PROCEDURE — 86580 TB INTRADERMAL TEST: CPT

## 2020-11-05 LAB
INDURATION: 0 MM
TB SKIN TEST: NEGATIVE

## 2020-11-24 ENCOUNTER — LAB (OUTPATIENT)
Dept: LAB | Facility: CLINIC | Age: 30
End: 2020-11-24
Payer: COMMERCIAL

## 2020-11-24 ENCOUNTER — TRANSCRIBE ORDERS (OUTPATIENT)
Dept: LAB | Facility: CLINIC | Age: 30
End: 2020-11-24

## 2020-11-24 DIAGNOSIS — R53.83 OTHER FATIGUE: ICD-10-CM

## 2020-11-24 LAB
25(OH)D3 SERPL-MCNC: 35.5 NG/ML (ref 30–100)
FERRITIN SERPL-MCNC: 36 NG/ML (ref 8–388)
FOLATE SERPL-MCNC: 19.7 NG/ML (ref 3.1–17.5)
IRON SATN MFR SERPL: 25 %
IRON SERPL-MCNC: 90 UG/DL (ref 50–170)
TIBC SERPL-MCNC: 365 UG/DL (ref 250–450)
VIT B12 SERPL-MCNC: 790 PG/ML (ref 100–900)

## 2020-11-24 PROCEDURE — 82306 VITAMIN D 25 HYDROXY: CPT

## 2020-11-24 PROCEDURE — 36415 COLL VENOUS BLD VENIPUNCTURE: CPT

## 2020-11-24 PROCEDURE — 82728 ASSAY OF FERRITIN: CPT

## 2020-11-24 PROCEDURE — 83550 IRON BINDING TEST: CPT

## 2020-11-24 PROCEDURE — 82746 ASSAY OF FOLIC ACID SERUM: CPT

## 2020-11-24 PROCEDURE — 83540 ASSAY OF IRON: CPT

## 2020-11-24 PROCEDURE — 82607 VITAMIN B-12: CPT

## 2020-12-22 DIAGNOSIS — Z30.9 ENCOUNTER FOR CONTRACEPTIVE MANAGEMENT, UNSPECIFIED TYPE: ICD-10-CM

## 2020-12-22 RX ORDER — NORETHINDRONE 0.35 MG/1
TABLET ORAL
Qty: 84 TABLET | Refills: 1 | Status: SHIPPED | OUTPATIENT
Start: 2020-12-22 | End: 2021-06-10

## 2021-01-06 ENCOUNTER — TELEPHONE (OUTPATIENT)
Dept: FAMILY MEDICINE CLINIC | Facility: CLINIC | Age: 31
End: 2021-01-06

## 2021-01-13 ENCOUNTER — HOSPITAL ENCOUNTER (OUTPATIENT)
Dept: RADIOLOGY | Facility: HOSPITAL | Age: 31
Discharge: HOME | End: 2021-01-13
Attending: INTERNAL MEDICINE
Payer: COMMERCIAL

## 2021-01-13 DIAGNOSIS — K76.9 LIVER LESION, RIGHT LOBE: ICD-10-CM

## 2021-01-13 PROCEDURE — G1004 CDSM NDSC: HCPCS

## 2021-01-13 PROCEDURE — 74183 MRI ABD W/O CNTR FLWD CNTR: CPT

## 2021-01-13 PROCEDURE — A9585 GADOBUTROL INJECTION: HCPCS | Performed by: INTERNAL MEDICINE

## 2021-01-13 RX ADMIN — GADOBUTROL 5 ML: 604.72 INJECTION INTRAVENOUS at 16:05

## 2021-01-14 DIAGNOSIS — R51.9 GENERALIZED HEADACHES: ICD-10-CM

## 2021-01-14 RX ORDER — PROPRANOLOL HCL 60 MG
60 CAPSULE, EXTENDED RELEASE 24HR ORAL DAILY
Qty: 90 CAPSULE | Refills: 3 | Status: SHIPPED | OUTPATIENT
Start: 2021-01-14 | End: 2021-04-13 | Stop reason: SDUPTHER

## 2021-01-19 ENCOUNTER — TELEPHONE (OUTPATIENT)
Dept: GASTROENTEROLOGY | Facility: AMBULARY SURGERY CENTER | Age: 31
End: 2021-01-19

## 2021-01-19 DIAGNOSIS — K76.9 LIVER LESION, RIGHT LOBE: Primary | ICD-10-CM

## 2021-01-19 NOTE — TELEPHONE ENCOUNTER
CHRISTOPHE showed liver lesions are stable  They feel it is likely secondary to her oral contraceptives  She can repeat the study after off these meds for 6 months    Kurt Roca

## 2021-01-19 NOTE — TELEPHONE ENCOUNTER
Patients GI provider:  Dr Reji Moctezuma    Number to return call: (  [phone]    Reason for call: Pt has significant findings on mri abdomen in epic, per mike in radiology, will send tiger text as well    Scheduled procedure/appointment date if applicable: Apt/procedure   NA

## 2021-01-20 NOTE — TELEPHONE ENCOUNTER
I called and spoke with pt we went over results      Patient states she was on contraceptive with estrogen and now on new pill w/o estrogen , are you recommending patient be off any/all contraceptives , Please clarify

## 2021-01-21 NOTE — TELEPHONE ENCOUNTER
Non estrogen oral contraceptives should be fine  We will need repeat cross-sectional imaging in 6 months  I have put the order in

## 2021-01-25 ENCOUNTER — OFFICE VISIT (OUTPATIENT)
Dept: FAMILY MEDICINE CLINIC | Facility: CLINIC | Age: 31
End: 2021-01-25
Payer: COMMERCIAL

## 2021-01-25 VITALS
BODY MASS INDEX: 19.46 KG/M2 | TEMPERATURE: 97.5 F | RESPIRATION RATE: 16 BRPM | HEART RATE: 70 BPM | WEIGHT: 114 LBS | HEIGHT: 64 IN | OXYGEN SATURATION: 100 % | SYSTOLIC BLOOD PRESSURE: 100 MMHG | DIASTOLIC BLOOD PRESSURE: 66 MMHG

## 2021-01-25 DIAGNOSIS — K76.9 LIVER LESION, RIGHT LOBE: ICD-10-CM

## 2021-01-25 DIAGNOSIS — R53.83 OTHER FATIGUE: ICD-10-CM

## 2021-01-25 DIAGNOSIS — R53.83 FATIGUE, UNSPECIFIED TYPE: Primary | ICD-10-CM

## 2021-01-25 PROCEDURE — 99214 OFFICE O/P EST MOD 30 MIN: CPT | Performed by: FAMILY MEDICINE

## 2021-01-25 PROCEDURE — 3008F BODY MASS INDEX DOCD: CPT | Performed by: FAMILY MEDICINE

## 2021-01-25 PROCEDURE — 1036F TOBACCO NON-USER: CPT | Performed by: FAMILY MEDICINE

## 2021-01-26 PROBLEM — J45.909 ASTHMA: Status: ACTIVE | Noted: 2019-06-12

## 2021-01-26 PROBLEM — K76.9 LIVER LESION, RIGHT LOBE: Status: ACTIVE | Noted: 2020-07-23

## 2021-01-26 PROBLEM — R16.0 ENLARGED LIVER: Status: ACTIVE | Noted: 2020-08-07
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
## 2021-02-17 ENCOUNTER — OFFICE VISIT (OUTPATIENT)
Dept: SLEEP CENTER | Facility: CLINIC | Age: 31
End: 2021-02-17
Payer: COMMERCIAL

## 2021-02-17 VITALS
WEIGHT: 114 LBS | HEIGHT: 64 IN | BODY MASS INDEX: 19.46 KG/M2 | DIASTOLIC BLOOD PRESSURE: 60 MMHG | SYSTOLIC BLOOD PRESSURE: 100 MMHG

## 2021-02-17 DIAGNOSIS — R53.83 FATIGUE, UNSPECIFIED TYPE: ICD-10-CM

## 2021-02-17 DIAGNOSIS — G25.81 RESTLESS LEG SYNDROME: Primary | ICD-10-CM

## 2021-02-17 DIAGNOSIS — G47.61 PLMD (PERIODIC LIMB MOVEMENT DISORDER): ICD-10-CM

## 2021-02-17 PROCEDURE — 99244 OFF/OP CNSLTJ NEW/EST MOD 40: CPT | Performed by: INTERNAL MEDICINE

## 2021-02-17 RX ORDER — PRAMIPEXOLE DIHYDROCHLORIDE 0.25 MG/1
TABLET ORAL
Qty: 90 TABLET | Refills: 2 | Status: SHIPPED | OUTPATIENT
Start: 2021-02-17 | End: 2021-10-29

## 2021-02-17 NOTE — PROGRESS NOTES
Consultation - R Nimesh 21 : 1990  MRN: [id number]      Assessment:  The patient complains of both exhaustion and drowsiness  Her exhaustion/fatigue could be result of a viral illness such as [place] virus, cytomegalovirus or perhaps Lyme disease  It could also be the result of a sleep disorder  The drowsiness in particular is related in part to insufficient sleep  She gets up between 4:00 a m  and 5:30 a m  every morning, since she and the other three members of her family share a single bathroom  She has low ferritin (36) at a result of excessive menstrual blood loss  Her arthritis in conjunction could be contributing to restless legs and periodic limb movements  Plan:  Start pramipexole 0 125 mg up to 0 75 mg at bedtime    The patient is reluctant to take iron infusion because of complications that her mother experienced    Consider sleep study    Follow up:  Four weeks    History of Present Illness:   27 y  o female who was doing well into 2019 when she underwent cholecystectomy  Subsequently, she felt fatigued and drowsy all of the time  Her fatigue required that she change her job  She still feels tired and never feels refreshed  She denies any history of snoring, but sleeps alone  She does have restless legs at bedtime and her bed covers are in disarray in the morning  Her ferritin level is 36         Review of Systems      Genitourinary excessive blood loss during menses and hot flashes at night   Cardiology none   Gastrointestinal none   Neurology frequent headaches, awaken with headache, need to move extremities, numbness/tingling of an extremity, forgetfulness and poor concentration or confusion,    Constitutional fatigue and weight change   Integumentary rash or dry skin   Psychiatry anxiety, depression and aggressiveness or irritability   Musculoskeletal joint pain, back pain, legs twitching/jerking and leg cramps   Pulmonary shortness of breath with activity, chest tightness and frequent cough   ENT throat clearing   Endocrine none   Hematological none         I have reviewed and updated the review of systems as necessary    Historical Information    Past Medical History:  GERD, pancreatitis, hepatomegaly, liver lesion, asthma, chronic migraine headaches, carpal tunnel syndrome    Family History: The patient's mother has anemia and restless legs and the patient's father has obstructive sleep apnea    Social History     Socioeconomic History    Marital status: Single     Spouse name: None    Number of children: None    Years of education: None    Highest education level: None   Occupational History    None   Social Needs    Financial resource strain: None    Food insecurity     Worry: None     Inability: None    Transportation needs     Medical: None     Non-medical: None   Tobacco Use    Smoking status: Never Smoker    Smokeless tobacco: Never Used   Substance and Sexual Activity    Alcohol use: Never     Frequency: Never     Binge frequency: Never     Comment: social alchol use (As per allscripts)    Drug use: No    Sexual activity: Not Currently     Birth control/protection: Abstinence   Lifestyle    Physical activity     Days per week: None     Minutes per session: None    Stress: None   Relationships    Social connections     Talks on phone: None     Gets together: None     Attends Shinto service: None     Active member of club or organization: None     Attends meetings of clubs or organizations: None     Relationship status: None    Intimate partner violence     Fear of current or ex partner: None     Emotionally abused: None     Physically abused: None     Forced sexual activity: None   Other Topics Concern    None   Social History Narrative    Caffeine use         Sleep Schedule: unremarkable    Snoring:  Unknown    Witnessed Apnea:  Unknown    Medications/Allergies:    Current Outpatient Medications:     albuterol (PROVENTIL HFA,VENTOLIN HFA) 90 mcg/act inhaler, Inhale 1-2 puffs every 4 (four) hours as needed , Disp: , Rfl:     Tea 0 35 MG tablet, Take 1 tablet daily, Disp: 84 tablet, Rfl: 1    loratadine (CLARITIN) 10 mg tablet, , Disp: , Rfl:     Magnesium 250 MG TABS, Take 250 mg by mouth daily , Disp: , Rfl:     montelukast (SINGULAIR) 10 mg tablet, Take 10 mg by mouth daily at bedtime, Disp: , Rfl:     Multiple Vitamins-Minerals (WOMENS MULTIVITAMIN PO), Take 1 tablet by mouth daily , Disp: , Rfl:     propranolol (INDERAL LA) 60 mg 24 hr capsule, Take 1 capsule (60 mg total) by mouth daily, Disp: 90 capsule, Rfl: 3    pramipexole (MIRAPEX) 0 25 mg tablet, Take 1/2 to 3 tablets one hour before bed, Disp: 90 tablet, Rfl: 2        No notes on file                  Objective:    Vital Signs:   Vitals:    02/17/21 0857   BP: 100/60   Weight: 51 7 kg (114 lb)   Height: 5' 4" (1 626 m)     Neck Circumference: 11 5      [place] Sleepiness Scale: Total score: 6    Physical Exam:    General: Alert, appropriate, cooperative, normal build    Head: NC/AT, no retrognathia    Nose: No septal deviation    Throat: Airway diminished, tongue base thickened, no tonsils visualized, copious postnasal drip    Neck: Normal, no thyromegaly or lymphadenopathy, no JVD    Heart: RR, normal S1 and S2, no murmurs    Chest: Clear bilaterally    Extremity: No clubbing, cyanosis, no edema    Skin: Warm, dry    Neuro: No motor abnormalities, cranial nerves appear intact        Counseling / Coordination of Care  A description of the counseling / coordination of care: We discussed possible causes of her daytime sleepiness  Board Certified Sleep Specialist    Portions of the record may have been created with voice recognition software  Occasional wrong word or "sound a like" substitutions may have occurred due to the inherent limitations of voice recognition software    Read the chart carefully and recognize, using context, where substitutions have occurred

## 2021-03-15 ENCOUNTER — OFFICE VISIT (OUTPATIENT)
Dept: GASTROENTEROLOGY | Facility: CLINIC | Age: 31
End: 2021-03-15
Payer: COMMERCIAL

## 2021-03-15 VITALS
TEMPERATURE: 96.8 F | HEART RATE: 80 BPM | HEIGHT: 64 IN | BODY MASS INDEX: 19.81 KG/M2 | SYSTOLIC BLOOD PRESSURE: 96 MMHG | DIASTOLIC BLOOD PRESSURE: 65 MMHG | WEIGHT: 116 LBS

## 2021-03-15 DIAGNOSIS — K76.9 LIVER LESION, RIGHT LOBE: Primary | ICD-10-CM

## 2021-03-15 PROCEDURE — 99214 OFFICE O/P EST MOD 30 MIN: CPT | Performed by: INTERNAL MEDICINE
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
## 2021-03-17 ENCOUNTER — OFFICE VISIT (OUTPATIENT)
Dept: SLEEP CENTER | Facility: CLINIC | Age: 31
End: 2021-03-17
Payer: COMMERCIAL

## 2021-03-17 VITALS
BODY MASS INDEX: 19.97 KG/M2 | WEIGHT: 117 LBS | HEIGHT: 64 IN | HEART RATE: 72 BPM | DIASTOLIC BLOOD PRESSURE: 60 MMHG | SYSTOLIC BLOOD PRESSURE: 110 MMHG

## 2021-03-17 DIAGNOSIS — D50.0 IRON DEFICIENCY ANEMIA DUE TO CHRONIC BLOOD LOSS: Primary | ICD-10-CM

## 2021-03-17 PROCEDURE — 1036F TOBACCO NON-USER: CPT | Performed by: INTERNAL MEDICINE

## 2021-03-17 PROCEDURE — 99214 OFFICE O/P EST MOD 30 MIN: CPT | Performed by: INTERNAL MEDICINE
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
## 2021-03-18 ENCOUNTER — TELEPHONE (OUTPATIENT)
Dept: SLEEP CENTER | Facility: CLINIC | Age: 31
End: 2021-03-18

## 2021-03-22 ENCOUNTER — APPOINTMENT (OUTPATIENT)
Dept: LAB | Facility: CLINIC | Age: 31
End: 2021-03-22
Payer: COMMERCIAL

## 2021-03-22 DIAGNOSIS — D50.0 IRON DEFICIENCY ANEMIA DUE TO CHRONIC BLOOD LOSS: ICD-10-CM

## 2021-03-22 LAB — FERRITIN SERPL-MCNC: 29 NG/ML (ref 8–388)

## 2021-03-22 PROCEDURE — 82728 ASSAY OF FERRITIN: CPT

## 2021-03-22 PROCEDURE — 36415 COLL VENOUS BLD VENIPUNCTURE: CPT

## 2021-03-26 ENCOUNTER — TELEPHONE (OUTPATIENT)
Dept: SLEEP CENTER | Facility: CLINIC | Age: 31
End: 2021-03-26

## 2021-03-26 NOTE — TELEPHONE ENCOUNTER
Patient left voice message  She states that Dr Jude Steve discontinued her Singulair on 3/18/21 however since then she has been having increasing allergy symptoms and asthma issues  She is asking if Dr Jude Steve would be agreeable to her starting Singulair again  She also is asking if she should be taking an iron supplement as her level that was drawn on 3/22/21 is low  Dr Jude Steve, please advise

## 2021-03-29 ENCOUNTER — ANNUAL EXAM (OUTPATIENT)
Dept: OBGYN CLINIC | Facility: CLINIC | Age: 31
End: 2021-03-29
Payer: COMMERCIAL

## 2021-03-29 VITALS
SYSTOLIC BLOOD PRESSURE: 100 MMHG | HEIGHT: 66 IN | BODY MASS INDEX: 18.64 KG/M2 | WEIGHT: 116 LBS | DIASTOLIC BLOOD PRESSURE: 62 MMHG

## 2021-03-29 DIAGNOSIS — Z11.51 SCREENING FOR HUMAN PAPILLOMAVIRUS (HPV): ICD-10-CM

## 2021-03-29 DIAGNOSIS — Z01.419 ENCNTR FOR GYN EXAM (GENERAL) (ROUTINE) W/O ABN FINDINGS: Primary | ICD-10-CM

## 2021-03-29 PROCEDURE — 87624 HPV HI-RISK TYP POOLED RSLT: CPT | Performed by: OBSTETRICS & GYNECOLOGY

## 2021-03-29 PROCEDURE — S0612 ANNUAL GYNECOLOGICAL EXAMINA: HCPCS | Performed by: OBSTETRICS & GYNECOLOGY

## 2021-03-29 PROCEDURE — 3008F BODY MASS INDEX DOCD: CPT | Performed by: INTERNAL MEDICINE

## 2021-03-29 PROCEDURE — G0145 SCR C/V CYTO,THINLAYER,RESCR: HCPCS | Performed by: OBSTETRICS & GYNECOLOGY
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
## 2021-03-30 DIAGNOSIS — D50.0 IRON DEFICIENCY ANEMIA DUE TO CHRONIC BLOOD LOSS: Primary | ICD-10-CM

## 2021-03-30 DIAGNOSIS — J30.2 SEASONAL ALLERGIC RHINITIS, UNSPECIFIED TRIGGER: ICD-10-CM

## 2021-03-30 RX ORDER — FERROUS SULFATE TAB EC 324 MG (65 MG FE EQUIVALENT) 324 (65 FE) MG
324 TABLET DELAYED RESPONSE ORAL
Qty: 60 TABLET | Refills: 5 | Status: SHIPPED | OUTPATIENT
Start: 2021-03-30 | End: 2021-10-29

## 2021-03-30 RX ORDER — MONTELUKAST SODIUM 10 MG/1
10 TABLET ORAL
Qty: 30 TABLET | Refills: 5 | Status: SHIPPED | OUTPATIENT
Start: 2021-03-30

## 2021-03-31 LAB
HPV HR 12 DNA CVX QL NAA+PROBE: NEGATIVE
HPV16 DNA CVX QL NAA+PROBE: NEGATIVE
HPV18 DNA CVX QL NAA+PROBE: NEGATIVE

## 2021-03-31 NOTE — TELEPHONE ENCOUNTER
Patient made aware that Rx for Singulair and Iron available at [facility]

## 2021-04-01 LAB
LAB AP GYN PRIMARY INTERPRETATION: NORMAL
LAB AP LMP: NORMAL
Lab: NORMAL

## 2021-04-06 ENCOUNTER — TELEPHONE (OUTPATIENT)
Dept: SLEEP CENTER | Facility: CLINIC | Age: 31
End: 2021-04-06

## 2021-04-06 NOTE — TELEPHONE ENCOUNTER
Patient called with questions regarding her iron supplement  She states she has been taking iron on an empty stomach as directed on the bottle but it is making her nauseated  She is not sure if she should try taking it with food? Also, she is asking when she should have iron level rechecked? Dr Leopold Cuna please advise

## 2021-04-07 DIAGNOSIS — D50.8 IRON DEFICIENCY ANEMIA SECONDARY TO INADEQUATE DIETARY IRON INTAKE: Primary | ICD-10-CM

## 2021-04-08 ENCOUNTER — TELEPHONE (OUTPATIENT)
Dept: SLEEP CENTER | Facility: CLINIC | Age: 31
End: 2021-04-08

## 2021-04-08 NOTE — TELEPHONE ENCOUNTER
Spoke with patient, advised above  Patient concerned that her iron level was [place] and she is on a high dose asking if she should get ferritin level drawn sooner    Has follow up with you 6/29/2021

## 2021-04-13 DIAGNOSIS — D50.8 IRON DEFICIENCY ANEMIA SECONDARY TO INADEQUATE DIETARY IRON INTAKE: Primary | ICD-10-CM

## 2021-04-13 DIAGNOSIS — R51.9 GENERALIZED HEADACHES: ICD-10-CM

## 2021-04-13 RX ORDER — PROPRANOLOL HCL 60 MG
60 CAPSULE, EXTENDED RELEASE 24HR ORAL DAILY
Qty: 90 CAPSULE | Refills: 1 | Status: SHIPPED | OUTPATIENT
Start: 2021-04-13 | End: 2021-10-12

## 2021-04-13 NOTE — TELEPHONE ENCOUNTER
Pt requesting refill   Requested Prescriptions     Pending Prescriptions Disp Refills    propranolol (INDERAL LA) 60 mg 24 hr capsule 90 capsule 0     Sig: Take 1 capsule (60 mg total) by mouth daily

## 2021-05-10 ENCOUNTER — TELEPHONE (OUTPATIENT)
Dept: SLEEP CENTER | Facility: CLINIC | Age: 31
End: 2021-05-10

## 2021-05-10 NOTE — TELEPHONE ENCOUNTER
Received autofax refill request from [facility] for pramipexole 0 25mg  Called patient who states she has only been taking 1/2 tablet daily and does not need refill at this time

## 2021-06-10 DIAGNOSIS — Z30.9 ENCOUNTER FOR CONTRACEPTIVE MANAGEMENT, UNSPECIFIED TYPE: ICD-10-CM

## 2021-06-10 RX ORDER — ACETAMINOPHEN AND CODEINE PHOSPHATE 120; 12 MG/5ML; MG/5ML
SOLUTION ORAL
Qty: 84 TABLET | Refills: 1 | Status: SHIPPED | OUTPATIENT
Start: 2021-06-10 | End: 2021-06-17

## 2021-06-15 DIAGNOSIS — Z30.9 ENCOUNTER FOR CONTRACEPTIVE MANAGEMENT, UNSPECIFIED TYPE: ICD-10-CM

## 2021-06-17 RX ORDER — ACETAMINOPHEN AND CODEINE PHOSPHATE 120; 12 MG/5ML; MG/5ML
SOLUTION ORAL
Qty: 90 TABLET | Refills: 3 | Status: SHIPPED | OUTPATIENT
Start: 2021-06-17 | End: 2021-09-27

## 2021-06-24 ENCOUNTER — APPOINTMENT (OUTPATIENT)
Dept: LAB | Facility: CLINIC | Age: 31
End: 2021-06-24
Payer: COMMERCIAL

## 2021-06-24 DIAGNOSIS — D50.8 IRON DEFICIENCY ANEMIA SECONDARY TO INADEQUATE DIETARY IRON INTAKE: ICD-10-CM

## 2021-06-24 LAB — FERRITIN SERPL-MCNC: 63 NG/ML (ref 8–388)

## 2021-06-24 PROCEDURE — 36415 COLL VENOUS BLD VENIPUNCTURE: CPT

## 2021-06-24 PROCEDURE — 82728 ASSAY OF FERRITIN: CPT

## 2021-06-29 ENCOUNTER — OFFICE VISIT (OUTPATIENT)
Dept: SLEEP CENTER | Facility: CLINIC | Age: 31
End: 2021-06-29
Payer: COMMERCIAL

## 2021-06-29 VITALS
BODY MASS INDEX: 18.8 KG/M2 | HEART RATE: 74 BPM | WEIGHT: 117 LBS | DIASTOLIC BLOOD PRESSURE: 70 MMHG | HEIGHT: 66 IN | SYSTOLIC BLOOD PRESSURE: 110 MMHG

## 2021-06-29 DIAGNOSIS — D50.0 IRON DEFICIENCY ANEMIA DUE TO CHRONIC BLOOD LOSS: Primary | ICD-10-CM

## 2021-06-29 PROCEDURE — 3008F BODY MASS INDEX DOCD: CPT | Performed by: INTERNAL MEDICINE

## 2021-06-29 PROCEDURE — 1036F TOBACCO NON-USER: CPT | Performed by: INTERNAL MEDICINE

## 2021-06-29 PROCEDURE — 99214 OFFICE O/P EST MOD 30 MIN: CPT | Performed by: INTERNAL MEDICINE

## 2021-06-30 ENCOUNTER — TELEPHONE (OUTPATIENT)
Dept: SLEEP CENTER | Facility: CLINIC | Age: 31
End: 2021-06-30

## 2021-07-07 ENCOUNTER — TELEPHONE (OUTPATIENT)
Dept: SLEEP CENTER | Facility: CLINIC | Age: 31
End: 2021-07-07

## 2021-07-07 NOTE — TELEPHONE ENCOUNTER
----- Message from Clint Dominguez MD sent at 6/24/2021  4:20 PM EDT -----  Normal  ----- Message -----  From: Lab, Background User  Sent: 6/24/2021   4:10 PM EDT  To: Clint Dominguez MD

## 2021-07-25 NOTE — PROGRESS NOTES
Patient ID: Lizzie Lassiter is a 32 y o  female  Assessment/Plan:   migraine headache   overall, headaches continue to be well controlled  -- continue Inderal LA 1 tablet a day  Headaches will control, could attempt to slowly titrate her Inderal   -- magnesium 500 mg today  -- limit use of NSAIDs no more than 3 times a week  She does have access to Fioricet however has not uses for quite some time  Prochlorperazine in the past of little benefit  --  If patient has extended migraine episodes lasting more than 24 hours in the past Decadron was beneficial   --  Encouraged adequate fluid intake and appropriate sleep hygiene  Encouraged to continue with her meditation and yoga  -- encouraged vitamin-D supplement  -- maintain headache diary  -- suggested updated eye exam      RLS  -- [facility]  -- continue on pramipexole 0 25 mg  At bedtime  --  continue ferrous sulfate          No problem-specific Assessment & Plan notes found for this encounter  Diagnoses and all orders for this visit:    Chronic migraine without aura without status migrainosus, not intractable    RLS (restless legs syndrome)           Subjective:    KAYODE Shipmanmaliha Mejia is a 60-year-old female who presents for neurologic follow-up for migraines  Patient last seen via telemedicine in October 2020     today, overall her headaches have remained fairly stable  She continues on Inderal 60 mg long acting once a day  She is having approximately  1 to 4 migraines a month  She has a minor to moderate headache approximately 5 times a month, these typically do not require anything other than "migraine gel and "on occasion some Tylenol      She has not used her Fioricet for quite some time, nor has she had to use her Decadron  her headaches are triggered by certain smells, stress  have improved with lifestyle changes, doing  Meditation  Patient on low-dose magnesium, the past higher issues provoked GI side effects  She had been on cyproheptadine at night, this was discontinued  She notes some mild orthostatic dizziness however does not last long  She denies any changes in her vision, no new focal weakness     patient noted having some episodes where her eyes felt like they were roving, they actually were not doing so, she would have some mild dizziness, they would last up to 5 hours  Would occur intermittently  Seem to occur more last year when she was on the computer more  No other associated symptoms  She has not had an eye exam for quite some time  Patient continues on low-dose Mirapex, RLS well controlled  This seems to be related times with her iron levels, recently within normal limits  She is having issues with her menstrual cycle once again, is following with her OBGYN  Her primary complaint when last seen was in regards to fatigue, she underwent labs, no evidence of inflammatory process, TSH at that time was normal     Headaches:   PREVENTIVE: propranolol, venlafaxine (mental fogginess), Neurontin (hallucination and suicidal ideation), Cyproheptadine, Magnesium, B2  ABORTIVE: Relpax, Fioricet, Maxalt (anaphalxis?)     Headache trigger: Fatigue, stress Stress/Tension, Weather change, Menstruation, Alcohol, Coughing, lack of sleep, smells, crying, loud noises, sun light, cold weather  Aura- lacrimation, blurry vision, scatoma which floaters but these do not happen often     How often do the headaches occur -  up until end of 2018, patient was having approximately 14 to 16 headaches a month    Patient with significant improvement last year having only approximately  2-4 headaches a month  2021:   January-2, 11 [facility],  [address] [place] [address],  [address] [place] [address],  June-1  2020:  June 5, July -2, August-2, September-1, October- 5,  [place] [facility]  2019 Jan 6, Feb 4, march 2, April 4 , may 4 Theresa 2, July 2 Aug 5, sept 3,  October-1  What time of the day do the headaches start - anytime of the day   How long do the headaches last - few hours to all day - if it starts in the morning then it will last all day (no matter what time of day will last until goes to bed)  Where are they located - temporal : tight band, left supra orbital, occipital sometimes  What is the intensity of pain - average 4-6/10  Describe your usual headache - Nagging, Aching, Dull, Tight band  Associated symptoms:   -       Decrease of appetite, nausea (almost always)  -       Photophobia, phonophobia, sensitivity to smell   -       Problem with concentration  -       Change in pupil size (small)  -       Lacrimation  -       Light-headed or dizzy, stiff or sore neck,   -       prefer to be alone and in a dark room, unable to work      labs   11/24/2020; ferritin  Normal,  Iron = 90, TIBC = 365, folate = 19 7, B12 = 790,  Vitamin-D = 35 5    The following portions of the patient's history were reviewed and updated as appropriate: allergies, current medications, past family history, past medical history, past social history, past surgical history and problem list          Objective:  Current Outpatient Medications   Medication Sig Dispense Refill    albuterol (PROVENTIL HFA,VENTOLIN HFA) 90 mcg/act inhaler Inhale 1-2 puffs every 4 (four) hours as needed       ferrous sulfate 324 (65 Fe) mg Take 1 tablet (324 mg total) by mouth 2 (two) times a day before meals 60 tablet 5    loratadine (CLARITIN) 10 mg tablet       Magnesium 250 MG TABS Take 250 mg by mouth daily       montelukast (SINGULAIR) 10 mg tablet Take 1 tablet (10 mg total) by mouth daily at bedtime 30 tablet 5    Multiple Vitamins-Minerals (WOMENS MULTIVITAMIN PO) Take 1 tablet by mouth daily       norethindrone (Tea) 0 35 MG tablet TAKE 1 TABLET EVERY DAY 90 tablet 3    pramipexole (MIRAPEX) 0 25 mg tablet Take 1/2 to 3 tablets one hour before bed 90 tablet 2    propranolol (INDERAL LA) 60 mg 24 hr capsule Take 1 capsule (60 mg total) by mouth daily 90 capsule 1     No current facility-administered medications for this visit  Blood pressure 98/52, pulse 68, weight 53 1 kg (117 lb), not currently breastfeeding  Physical Exam  Constitutional:       Appearance: Normal appearance  HENT:      Head: Normocephalic  Eyes:      Extraocular Movements: Extraocular movements intact  Pupils: Pupils are equal, round, and reactive to light  Cardiovascular:      Rate and Rhythm: Normal rate and regular rhythm  Pulses: Normal pulses  Pulmonary:      Effort: Pulmonary effort is normal    Musculoskeletal:         General: Normal range of motion  Cervical back: Normal range of motion  Skin:     General: Skin is warm  Neurological:      Deep Tendon Reflexes: Strength normal and reflexes are normal and symmetric  Psychiatric:         Mood and Affect: Mood normal          Speech: Speech normal          Thought Content: Thought content normal          Neurological Exam  Mental Status  Awake, alert and oriented to person, place and time  Speech is normal  Language is fluent with no aphasia  Cranial Nerves  CN II: Visual fields full to confrontation  CN III, IV, VI: Extraocular movements intact bilaterally  Pupils equal round and reactive to light bilaterally  CN V: Facial sensation is normal   CN VII: Full and symmetric facial movement  CN XI: Shoulder shrug strength is normal   CN XII: Tongue midline without atrophy or fasciculations  Motor  Normal muscle bulk throughout  Normal muscle tone  No abnormal involuntary movements  Strength is 5/5 throughout all four extremities  Sensory  Temperature is normal in upper and lower extremities  Vibration is normal in upper and lower extremities  Reflexes  Deep tendon reflexes are 2+ and symmetric in all four extremities with downgoing toes bilaterally  Coordination  Right: Finger-to-nose normal   Left: Finger-to-nose normal     Gait  Casual gait is normal including stance, stride, and arm swing          ROS: personally reviewed with patient    Review of Systems  Constitutional: Negative  Negative for appetite change and fever  HENT: Negative  Negative for hearing loss, tinnitus, trouble swallowing and voice change  Eyes: Negative  Negative for photophobia and pain  Respiratory: Negative  Negative for shortness of breath  Cardiovascular: Negative  Negative for palpitations  Gastrointestinal: Negative  Negative for nausea and vomiting  Endocrine: Negative  Negative for cold intolerance  Genitourinary: Negative  Negative for dysuria, frequency and urgency  Musculoskeletal: Negative  Negative for myalgias and neck pain  Skin: Negative  Negative for rash  Neurological: Positive for headaches (0-5 monthly )  Negative for dizziness, tremors, seizures, syncope, facial asymmetry, speech difficulty, weakness, light-headedness and numbness  Hematological: Negative  Does not bruise/bleed easily  Psychiatric/Behavioral: Negative  Negative for confusion, hallucinations and sleep disturbance  All other systems reviewed and are negative

## 2021-07-26 ENCOUNTER — OFFICE VISIT (OUTPATIENT)
Dept: NEUROLOGY | Facility: CLINIC | Age: 31
End: 2021-07-26
Payer: COMMERCIAL

## 2021-07-26 VITALS
BODY MASS INDEX: 18.88 KG/M2 | DIASTOLIC BLOOD PRESSURE: 52 MMHG | SYSTOLIC BLOOD PRESSURE: 98 MMHG | HEART RATE: 68 BPM | WEIGHT: 117 LBS

## 2021-07-26 DIAGNOSIS — G25.81 RLS (RESTLESS LEGS SYNDROME): ICD-10-CM

## 2021-07-26 DIAGNOSIS — G43.709 CHRONIC MIGRAINE WITHOUT AURA WITHOUT STATUS MIGRAINOSUS, NOT INTRACTABLE: Primary | ICD-10-CM

## 2021-07-26 PROCEDURE — 99214 OFFICE O/P EST MOD 30 MIN: CPT | Performed by: NURSE PRACTITIONER

## 2021-07-26 NOTE — PROGRESS NOTES
Review of Systems   Constitutional: Negative  Negative for appetite change and fever  HENT: Negative  Negative for hearing loss, tinnitus, trouble swallowing and voice change  Eyes: Negative  Negative for photophobia and pain  Respiratory: Negative  Negative for shortness of breath  Cardiovascular: Negative  Negative for palpitations  Gastrointestinal: Negative  Negative for nausea and vomiting  Endocrine: Negative  Negative for cold intolerance  Genitourinary: Negative  Negative for dysuria, frequency and urgency  Musculoskeletal: Negative  Negative for myalgias and neck pain  Skin: Negative  Negative for rash  Neurological: Positive for headaches (0-5 monthly )  Negative for dizziness, tremors, seizures, syncope, facial asymmetry, speech difficulty, weakness, light-headedness and numbness  Hematological: Negative  Does not bruise/bleed easily  Psychiatric/Behavioral: Negative  Negative for confusion, hallucinations and sleep disturbance  All other systems reviewed and are negative

## 2021-07-26 NOTE — PATIENT INSTRUCTIONS
Patient clinically stable   continue Inderal LA 60 mg, if stable, can consider decreasing dose   patient to call with any changes   has access to Fioricet if needed   limit NSAIDs no more than 3 times a week   continue magnesium   continues on low-dose  Mirapex

## 2021-08-17 ENCOUNTER — TELEMEDICINE (OUTPATIENT)
Dept: OBGYN CLINIC | Facility: CLINIC | Age: 31
End: 2021-08-17
Payer: COMMERCIAL

## 2021-08-17 DIAGNOSIS — N92.1 BREAKTHROUGH BLEEDING ON BIRTH CONTROL PILLS: Primary | ICD-10-CM

## 2021-08-17 PROCEDURE — 99213 OFFICE O/P EST LOW 20 MIN: CPT | Performed by: OBSTETRICS & GYNECOLOGY

## 2021-08-17 PROCEDURE — 1036F TOBACCO NON-USER: CPT | Performed by: OBSTETRICS & GYNECOLOGY
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
## 2021-08-17 NOTE — PATIENT INSTRUCTIONS
The other progesterone only pill available is:  SLYND  This is drospirenone as opposed to norethindrone  [email or web address]  [email or web address]

## 2021-09-02 ENCOUNTER — TELEPHONE (OUTPATIENT)
Dept: RADIOLOGY | Facility: HOSPITAL | Age: 31
End: 2021-09-02

## 2021-09-03 ENCOUNTER — APPOINTMENT (OUTPATIENT)
Dept: LAB | Facility: HOSPITAL | Age: 31
End: 2021-09-03
Payer: COMMERCIAL

## 2021-09-03 ENCOUNTER — HOSPITAL ENCOUNTER (OUTPATIENT)
Dept: RADIOLOGY | Facility: HOSPITAL | Age: 31
Discharge: HOME | End: 2021-09-03
Attending: INTERNAL MEDICINE
Payer: COMMERCIAL

## 2021-09-03 DIAGNOSIS — N92.1 BREAKTHROUGH BLEEDING ON BIRTH CONTROL PILLS: ICD-10-CM

## 2021-09-03 DIAGNOSIS — K76.9 LIVER LESION, RIGHT LOBE: ICD-10-CM

## 2021-09-03 DIAGNOSIS — K86.1 CHRONIC PANCREATITIS, UNSPECIFIED PANCREATITIS TYPE (HCC): ICD-10-CM

## 2021-09-03 LAB
ALBUMIN SERPL BCP-MCNC: 4.1 G/DL (ref 3.5–5)
ALP SERPL-CCNC: 77 U/L (ref 46–116)
ALT SERPL W P-5'-P-CCNC: 33 U/L (ref 12–78)
ANION GAP SERPL CALCULATED.3IONS-SCNC: 2 MMOL/L (ref 4–13)
AST SERPL W P-5'-P-CCNC: 21 U/L (ref 5–45)
BASOPHILS # BLD AUTO: 0.04 THOUSANDS/ΜL (ref 0–0.1)
BASOPHILS NFR BLD AUTO: 1 % (ref 0–1)
BILIRUB SERPL-MCNC: 0.78 MG/DL (ref 0.2–1)
BUN SERPL-MCNC: 11 MG/DL (ref 5–25)
CALCIUM SERPL-MCNC: 9.3 MG/DL (ref 8.3–10.1)
CHLORIDE SERPL-SCNC: 109 MMOL/L (ref 100–108)
CO2 SERPL-SCNC: 27 MMOL/L (ref 21–32)
CREAT SERPL-MCNC: 0.67 MG/DL (ref 0.6–1.3)
EOSINOPHIL # BLD AUTO: 0.08 THOUSAND/ΜL (ref 0–0.61)
EOSINOPHIL NFR BLD AUTO: 2 % (ref 0–6)
ERYTHROCYTE [DISTWIDTH] IN BLOOD BY AUTOMATED COUNT: 13.2 % (ref 11.6–15.1)
FERRITIN SERPL-MCNC: 85 NG/ML (ref 8–388)
GFR SERPL CREATININE-BSD FRML MDRD: 118 ML/MIN/1.73SQ M
GLUCOSE P FAST SERPL-MCNC: 83 MG/DL (ref 65–99)
HCT VFR BLD AUTO: 44.9 % (ref 34.8–46.1)
HGB BLD-MCNC: 14.2 G/DL (ref 11.5–15.4)
IMM GRANULOCYTES # BLD AUTO: 0.01 THOUSAND/UL (ref 0–0.2)
IMM GRANULOCYTES NFR BLD AUTO: 0 % (ref 0–2)
LYMPHOCYTES # BLD AUTO: 2.24 THOUSANDS/ΜL (ref 0.6–4.47)
LYMPHOCYTES NFR BLD AUTO: 43 % (ref 14–44)
MCH RBC QN AUTO: 29 PG (ref 26.8–34.3)
MCHC RBC AUTO-ENTMCNC: 31.6 G/DL (ref 31.4–37.4)
MCV RBC AUTO: 92 FL (ref 82–98)
MONOCYTES # BLD AUTO: 0.49 THOUSAND/ΜL (ref 0.17–1.22)
MONOCYTES NFR BLD AUTO: 9 % (ref 4–12)
NEUTROPHILS # BLD AUTO: 2.34 THOUSANDS/ΜL (ref 1.85–7.62)
NEUTS SEG NFR BLD AUTO: 45 % (ref 43–75)
NRBC BLD AUTO-RTO: 0 /100 WBCS
PLATELET # BLD AUTO: 194 THOUSANDS/UL (ref 149–390)
PMV BLD AUTO: 11.8 FL (ref 8.9–12.7)
POTASSIUM SERPL-SCNC: 4.4 MMOL/L (ref 3.5–5.3)
PROT SERPL-MCNC: 8.1 G/DL (ref 6.4–8.2)
RBC # BLD AUTO: 4.9 MILLION/UL (ref 3.81–5.12)
SODIUM SERPL-SCNC: 138 MMOL/L (ref 136–145)
TSH SERPL DL<=0.05 MIU/L-ACNC: 1.48 UIU/ML (ref 0.36–3.74)
WBC # BLD AUTO: 5.2 THOUSAND/UL (ref 4.31–10.16)

## 2021-09-03 PROCEDURE — 85025 COMPLETE CBC W/AUTO DIFF WBC: CPT

## 2021-09-03 PROCEDURE — 82728 ASSAY OF FERRITIN: CPT | Performed by: INTERNAL MEDICINE

## 2021-09-03 PROCEDURE — 84402 ASSAY OF FREE TESTOSTERONE: CPT

## 2021-09-03 PROCEDURE — 74177 CT ABD & PELVIS W/CONTRAST: CPT

## 2021-09-03 PROCEDURE — 84403 ASSAY OF TOTAL TESTOSTERONE: CPT

## 2021-09-03 PROCEDURE — G1004 CDSM NDSC: HCPCS

## 2021-09-03 PROCEDURE — 82627 DEHYDROEPIANDROSTERONE: CPT

## 2021-09-03 PROCEDURE — 80053 COMPREHEN METABOLIC PANEL: CPT

## 2021-09-03 PROCEDURE — 84443 ASSAY THYROID STIM HORMONE: CPT

## 2021-09-03 PROCEDURE — 36415 COLL VENOUS BLD VENIPUNCTURE: CPT

## 2021-09-03 RX ADMIN — IOHEXOL 85 ML: 350 INJECTION, SOLUTION INTRAVENOUS at 09:43

## 2021-09-04 LAB
DHEA-S SERPL-MCNC: 46 UG/DL (ref 84.8–378)
TESTOST FREE SERPL-MCNC: 1.7 PG/ML (ref 0–4.2)
TESTOST SERPL-MCNC: <3 NG/DL (ref 8–60)

## 2021-09-15 ENCOUNTER — HOSPITAL ENCOUNTER (OUTPATIENT)
Dept: RADIOLOGY | Age: 31
Discharge: HOME | End: 2021-09-15
Payer: COMMERCIAL

## 2021-09-15 DIAGNOSIS — N92.1 BREAKTHROUGH BLEEDING ON BIRTH CONTROL PILLS: ICD-10-CM

## 2021-09-15 PROCEDURE — 76830 TRANSVAGINAL US NON-OB: CPT

## 2021-09-15 PROCEDURE — 76856 US EXAM PELVIC COMPLETE: CPT

## 2021-09-16 ENCOUNTER — TELEPHONE (OUTPATIENT)
Dept: OBGYN CLINIC | Facility: CLINIC | Age: 31
End: 2021-09-16

## 2021-09-16 NOTE — TELEPHONE ENCOUNTER
----- Message from Orquidea Eller MD sent at 9/16/2021  8:48 AM EDT -----  Labs without evidence PCOS

## 2021-09-23 ENCOUNTER — OFFICE VISIT (OUTPATIENT)
Dept: OBGYN CLINIC | Facility: CLINIC | Age: 31
End: 2021-09-23
Payer: COMMERCIAL

## 2021-09-23 VITALS — BODY MASS INDEX: 18.69 KG/M2 | WEIGHT: 115.8 LBS | DIASTOLIC BLOOD PRESSURE: 62 MMHG | SYSTOLIC BLOOD PRESSURE: 100 MMHG

## 2021-09-23 DIAGNOSIS — N92.6 IRREGULAR BLEEDING: Primary | ICD-10-CM

## 2021-09-23 PROCEDURE — 99214 OFFICE O/P EST MOD 30 MIN: CPT | Performed by: OBSTETRICS & GYNECOLOGY
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
## 2021-09-27 ENCOUNTER — TELEPHONE (OUTPATIENT)
Dept: SLEEP CENTER | Facility: CLINIC | Age: 31
End: 2021-09-27

## 2021-09-27 DIAGNOSIS — N92.6 IRREGULAR BLEEDING: Primary | ICD-10-CM

## 2021-09-27 RX ORDER — DROSPIRENONE 4 MG/1
TABLET, FILM COATED ORAL
Qty: 90 TABLET | Refills: 0 | Status: SHIPPED | OUTPATIENT
Start: 2021-09-27 | End: 2021-12-13 | Stop reason: SDUPTHER

## 2021-09-27 NOTE — TELEPHONE ENCOUNTER
----- Message from [id number] [facility] sent at 9/27/2021  8:56 AM EDT -----  Regarding: Prescription Question  Contact: [phone]  Hi Dr Jj Brush,    I had my ferritin checked on September 3  At that time it was 83  I have enough for taking iron two times per day for this week  I will be changing my Birth Control Pill to see if it helps with regulating my cycle  Should I continue taking iron, but move down to taking it once per day? Should I stop taking iron and see how things look in December when I get my bloodwork done again? Let me know what you think  If you want me to continue talking iron, I will need a refill by the end of the week      Thanks,     Patricia Baldwin

## 2021-09-27 NOTE — TELEPHONE ENCOUNTER
----- Message from Ирина Padilla sent at 9/25/2021  3:30 PM EDT -----  Regarding: Visit Follow-Up Question  Contact: [phone]  Hi Dr Pauline Gomes! I looked on [employer] which said that my prescription should be covered  I also called [facility] pharmacy in [facility] to see if they could give me pricing  They said it would be $0 for both the 1 and 3-month supplies  I think I prefer 3 month so I don't have to go to the pharmacy once a month  I think it's fine for someone to call in the prescription for Slynd  My pack will run out two weeks from this coming Tuesday, so we do have some time       Thanks,     Ivanna Peterson

## 2021-10-11 ENCOUNTER — TELEPHONE (OUTPATIENT)
Dept: NEUROLOGY | Facility: CLINIC | Age: 31
End: 2021-10-11

## 2021-10-11 ENCOUNTER — TELEPHONE (OUTPATIENT)
Dept: SLEEP CENTER | Facility: CLINIC | Age: 31
End: 2021-10-11

## 2021-10-12 ENCOUNTER — OFFICE VISIT (OUTPATIENT)
Dept: GASTROENTEROLOGY | Facility: CLINIC | Age: 31
End: 2021-10-12
Payer: COMMERCIAL

## 2021-10-12 VITALS
SYSTOLIC BLOOD PRESSURE: 90 MMHG | HEART RATE: 80 BPM | HEIGHT: 66 IN | TEMPERATURE: 98.1 F | DIASTOLIC BLOOD PRESSURE: 60 MMHG | WEIGHT: 114.74 LBS | BODY MASS INDEX: 18.44 KG/M2

## 2021-10-12 DIAGNOSIS — R51.9 GENERALIZED HEADACHES: ICD-10-CM

## 2021-10-12 DIAGNOSIS — K76.9 LIVER LESION, RIGHT LOBE: Primary | ICD-10-CM

## 2021-10-12 PROCEDURE — 99214 OFFICE O/P EST MOD 30 MIN: CPT | Performed by: PHYSICIAN ASSISTANT

## 2021-10-12 PROCEDURE — 1036F TOBACCO NON-USER: CPT | Performed by: PHYSICIAN ASSISTANT

## 2021-10-12 RX ORDER — NORETHINDRONE 0.35 MG/1
TABLET ORAL
COMMUNITY
Start: 2021-10-07 | End: 2021-10-29

## 2021-10-12 RX ORDER — PROPRANOLOL HCL 60 MG
CAPSULE, EXTENDED RELEASE 24HR ORAL
Qty: 90 CAPSULE | Refills: 1 | Status: SHIPPED | OUTPATIENT
Start: 2021-10-12 | End: 2021-10-13 | Stop reason: DRUGHIGH

## 2021-10-30 ENCOUNTER — IMMUNIZATIONS (OUTPATIENT)
Dept: FAMILY MEDICINE CLINIC | Facility: CLINIC | Age: 31
End: 2021-10-30
Payer: COMMERCIAL

## 2021-10-30 VITALS — TEMPERATURE: 98 F

## 2021-10-30 DIAGNOSIS — Z23 INFLUENZA VACCINE NEEDED: Primary | ICD-10-CM

## 2021-10-30 PROCEDURE — 90471 IMMUNIZATION ADMIN: CPT | Performed by: NURSE PRACTITIONER

## 2021-10-30 PROCEDURE — 90686 IIV4 VACC NO PRSV 0.5 ML IM: CPT | Performed by: NURSE PRACTITIONER

## 2021-11-10 ENCOUNTER — TELEPHONE (OUTPATIENT)
Dept: NEUROLOGY | Facility: CLINIC | Age: 31
End: 2021-11-10

## 2021-11-19 DIAGNOSIS — R51.9 GENERALIZED HEADACHES: Primary | ICD-10-CM

## 2021-11-23 ENCOUNTER — TELEPHONE (OUTPATIENT)
Dept: OBGYN CLINIC | Facility: CLINIC | Age: 31
End: 2021-11-23

## 2021-11-29 RX ORDER — PROPRANOLOL HCL 60 MG
60 CAPSULE, EXTENDED RELEASE 24HR ORAL DAILY
Qty: 90 CAPSULE | Refills: 0 | Status: SHIPPED | OUTPATIENT
Start: 2021-11-29 | End: 2022-02-21

## 2021-12-04 ENCOUNTER — APPOINTMENT (OUTPATIENT)
Dept: LAB | Facility: CLINIC | Age: 31
End: 2021-12-04
Payer: COMMERCIAL

## 2021-12-04 DIAGNOSIS — D50.0 IRON DEFICIENCY ANEMIA DUE TO CHRONIC BLOOD LOSS: ICD-10-CM

## 2021-12-04 LAB — FERRITIN SERPL-MCNC: 101 NG/ML (ref 8–388)

## 2021-12-04 PROCEDURE — 82728 ASSAY OF FERRITIN: CPT

## 2021-12-04 PROCEDURE — 36415 COLL VENOUS BLD VENIPUNCTURE: CPT

## 2021-12-10 ENCOUNTER — HOSPITAL ENCOUNTER (OUTPATIENT)
Dept: RADIOLOGY | Facility: HOSPITAL | Age: 31
Discharge: HOME | End: 2021-12-10
Payer: COMMERCIAL

## 2021-12-10 DIAGNOSIS — K76.9 LIVER LESION, RIGHT LOBE: ICD-10-CM

## 2021-12-10 PROCEDURE — 74183 MRI ABD W/O CNTR FLWD CNTR: CPT

## 2021-12-10 PROCEDURE — G1004 CDSM NDSC: HCPCS

## 2021-12-10 PROCEDURE — A9585 GADOBUTROL INJECTION: HCPCS | Performed by: PHYSICIAN ASSISTANT

## 2021-12-10 RX ADMIN — GADOBUTROL 6 ML: 604.72 INJECTION INTRAVENOUS at 11:59

## 2021-12-12 DIAGNOSIS — R51.9 GENERALIZED HEADACHES: ICD-10-CM

## 2021-12-13 ENCOUNTER — TELEPHONE (OUTPATIENT)
Dept: OBGYN CLINIC | Facility: CLINIC | Age: 31
End: 2021-12-13

## 2021-12-13 DIAGNOSIS — N92.6 IRREGULAR BLEEDING: ICD-10-CM

## 2021-12-13 RX ORDER — PROPRANOLOL HYDROCHLORIDE 20 MG/1
TABLET ORAL
Qty: 60 TABLET | Refills: 1 | OUTPATIENT
Start: 2021-12-13

## 2021-12-13 RX ORDER — DROSPIRENONE 4 MG/1
TABLET, FILM COATED ORAL
Qty: 90 TABLET | Refills: 1 | Status: SHIPPED | OUTPATIENT
Start: 2021-12-13 | End: 2022-06-02

## 2022-02-14 ENCOUNTER — TELEPHONE (OUTPATIENT)
Dept: OBGYN CLINIC | Facility: CLINIC | Age: 32
End: 2022-02-14

## 2022-02-14 NOTE — TELEPHONE ENCOUNTER
----- Message from [address] [place] sent at 2/13/2022  4:21 PM EST -----  Regarding: Medication  Hi Dr Rufina Verma,    I just wanted to update you about Slynd  I think it has been working okay  I assume I should be getting my period on the green pills, but that's not when I get it  I get it on the second week of the pill pack with white pills, but it is consistent  The bleeding is much less than I have had on other pills and it has been lasting for a shorter time  I think I will continue on this medication  I have one more pill pack left  I was just wondering when I have to schedule my yearly exam so I will be able to continue to receive this medication  If a nurse needs to talk to me by phone, the best number to reach me is [phone]  Thank you! Alize Crowley

## 2022-02-20 DIAGNOSIS — R51.9 GENERALIZED HEADACHES: ICD-10-CM

## 2022-02-21 ENCOUNTER — TELEPHONE (OUTPATIENT)
Dept: GASTROENTEROLOGY | Facility: CLINIC | Age: 32
End: 2022-02-21

## 2022-02-21 RX ORDER — PROPRANOLOL HCL 60 MG
CAPSULE, EXTENDED RELEASE 24HR ORAL
Qty: 90 CAPSULE | Refills: 0 | Status: SHIPPED | OUTPATIENT
Start: 2022-02-21 | End: 2022-05-24

## 2022-02-21 NOTE — TELEPHONE ENCOUNTER
----- Message from Nan Mckinley PA-C sent at 2/16/2022 12:41 PM EST -----  Hi,     Patient has an MRI schedule for early March and will need follow up with Dr Raudel Flaherty to review  Patient will schedule MRI on her own, please schedule follow up visit  She prefers you call [phone]  Thanks! ! Shawna

## 2022-03-04 ENCOUNTER — OFFICE VISIT (OUTPATIENT)
Dept: NEUROLOGY | Facility: CLINIC | Age: 32
End: 2022-03-04
Payer: COMMERCIAL

## 2022-03-04 VITALS
HEART RATE: 84 BPM | SYSTOLIC BLOOD PRESSURE: 114 MMHG | DIASTOLIC BLOOD PRESSURE: 60 MMHG | WEIGHT: 107.7 LBS | BODY MASS INDEX: 18.49 KG/M2

## 2022-03-04 DIAGNOSIS — E55.9 VITAMIN D DEFICIENCY: ICD-10-CM

## 2022-03-04 DIAGNOSIS — G43.709 CHRONIC MIGRAINE WITHOUT AURA WITHOUT STATUS MIGRAINOSUS, NOT INTRACTABLE: Primary | ICD-10-CM

## 2022-03-04 DIAGNOSIS — F41.9 ANXIETY: ICD-10-CM

## 2022-03-04 PROCEDURE — 1036F TOBACCO NON-USER: CPT | Performed by: PHYSICIAN ASSISTANT

## 2022-03-04 PROCEDURE — 99214 OFFICE O/P EST MOD 30 MIN: CPT | Performed by: PHYSICIAN ASSISTANT
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
## 2022-03-04 NOTE — PROGRESS NOTES
Patient ID: Emelina Parry is a 32 y o  female  Assessment/Plan:    No problem-specific Assessment & Plan notes found for this encounter  {Assess/PlanSmartLinks:28906}       Subjective:    HPI    {[facility] HPI texts:40584}    {Common ambulatory SmartLinks:95441}         Objective:    Weight 48 9 kg (107 lb 11 2 oz), not currently breastfeeding  Physical Exam    Neurological Exam      ROS:    Review of Systems   Constitutional: Positive for appetite change (due to anxiety)  Negative for fever  HENT: Negative  Negative for hearing loss, tinnitus, trouble swallowing and voice change  Eyes: Negative  Negative for photophobia and pain  Respiratory: Negative  Negative for shortness of breath  Cardiovascular: Negative  Negative for palpitations  Gastrointestinal: Negative  Negative for nausea and vomiting  Endocrine: Negative  Negative for cold intolerance  Genitourinary: Negative  Negative for dysuria, frequency and urgency  Musculoskeletal: Positive for neck pain (Sometimes with migraines)  Negative for myalgias  Skin: Negative  Negative for rash  Allergic/Immunologic: Negative  Neurological: Positive for dizziness (Sometimes ) and headaches (headaches and migraines at times)  Negative for tremors, seizures, syncope, facial asymmetry, speech difficulty, weakness, light-headedness and numbness  Hematological: Negative  Does not bruise/bleed easily  Psychiatric/Behavioral: Negative for confusion, hallucinations and sleep disturbance  The patient is nervous/anxious (Anxiety)  All other systems reviewed and are negative

## 2022-03-26 ENCOUNTER — TRANSCRIBE ORDERS (OUTPATIENT)
Dept: LAB | Facility: CLINIC | Age: 32
End: 2022-03-26

## 2022-03-26 ENCOUNTER — APPOINTMENT (OUTPATIENT)
Dept: LAB | Facility: CLINIC | Age: 32
End: 2022-03-26
Payer: COMMERCIAL

## 2022-03-26 DIAGNOSIS — K76.9 LIVER LESION, RIGHT LOBE: ICD-10-CM

## 2022-03-26 DIAGNOSIS — D50.0 IRON DEFICIENCY ANEMIA SECONDARY TO BLOOD LOSS (CHRONIC): ICD-10-CM

## 2022-03-26 DIAGNOSIS — D50.0 IRON DEFICIENCY ANEMIA SECONDARY TO BLOOD LOSS (CHRONIC): Primary | ICD-10-CM

## 2022-03-26 DIAGNOSIS — E55.9 VITAMIN D DEFICIENCY: ICD-10-CM

## 2022-03-26 DIAGNOSIS — G43.709 CHRONIC MIGRAINE WITHOUT AURA WITHOUT STATUS MIGRAINOSUS, NOT INTRACTABLE: ICD-10-CM

## 2022-03-26 LAB
ANION GAP SERPL CALCULATED.3IONS-SCNC: -1 MMOL/L (ref 4–13)
BUN SERPL-MCNC: 15 MG/DL (ref 5–25)
CALCIUM SERPL-MCNC: 9.7 MG/DL (ref 8.3–10.1)
CHLORIDE SERPL-SCNC: 108 MMOL/L (ref 100–108)
CO2 SERPL-SCNC: 29 MMOL/L (ref 21–32)
CREAT SERPL-MCNC: 0.83 MG/DL (ref 0.6–1.3)
FERRITIN SERPL-MCNC: 106 NG/ML (ref 8–388)
GFR SERPL CREATININE-BSD FRML MDRD: 94 ML/MIN/1.73SQ M
GLUCOSE P FAST SERPL-MCNC: 97 MG/DL (ref 65–99)
POTASSIUM SERPL-SCNC: 4.7 MMOL/L (ref 3.5–5.3)
SODIUM SERPL-SCNC: 136 MMOL/L (ref 136–145)

## 2022-03-26 PROCEDURE — 82728 ASSAY OF FERRITIN: CPT

## 2022-03-26 PROCEDURE — 36415 COLL VENOUS BLD VENIPUNCTURE: CPT

## 2022-03-26 PROCEDURE — 80048 BASIC METABOLIC PNL TOTAL CA: CPT

## 2022-03-31 ENCOUNTER — TELEPHONE (OUTPATIENT)
Dept: SLEEP CENTER | Facility: CLINIC | Age: 32
End: 2022-03-31

## 2022-03-31 NOTE — TELEPHONE ENCOUNTER
----- Message from Caleb Monroe MD sent at 3/29/2022  4:44 PM EDT -----  Please let the patient know that her ferritin level is normal   ----- Message -----  From: Lab, Background User  Sent: 3/26/2022   2:56 PM EDT  To: Caleb Monroe MD

## 2022-04-04 ENCOUNTER — TELEPHONE (OUTPATIENT)
Dept: NEUROLOGY | Facility: CLINIC | Age: 32
End: 2022-04-04

## 2022-04-04 NOTE — TELEPHONE ENCOUNTER
----- Message from Kathrene Prader, RN sent at 4/4/2022  8:18 AM EDT -----  Regarding: FW: Vitamin D    ----- Message -----  From: Lily Maki  Sent: 4/2/2022   6:30 PM EDT  [facility]  Subject: Vitamin D                                        Hello,    I went to get blood work last Saturday (3/26)  I never got results for the Vitamin D  Did you get anything? It's not showing up on [employer] as if I did not get it done, but the results aren't there either       Thanks,     Tanner Dacosat

## 2022-04-06 NOTE — TELEPHONE ENCOUNTER
Reached , left message that blood level she inquired about was not drawn; please have done at her convenience

## 2022-04-14 ENCOUNTER — ANNUAL EXAM (OUTPATIENT)
Dept: OBGYN CLINIC | Facility: CLINIC | Age: 32
End: 2022-04-14
Payer: COMMERCIAL

## 2022-04-14 VITALS
WEIGHT: 106.2 LBS | HEIGHT: 66 IN | DIASTOLIC BLOOD PRESSURE: 58 MMHG | BODY MASS INDEX: 17.07 KG/M2 | SYSTOLIC BLOOD PRESSURE: 100 MMHG

## 2022-04-14 DIAGNOSIS — Z01.419 ROUTINE GYNECOLOGICAL EXAMINATION: Primary | ICD-10-CM

## 2022-04-14 PROCEDURE — S0612 ANNUAL GYNECOLOGICAL EXAMINA: HCPCS | Performed by: OBSTETRICS & GYNECOLOGY

## 2022-04-14 PROCEDURE — 3008F BODY MASS INDEX DOCD: CPT | Performed by: PHYSICIAN ASSISTANT
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
## 2022-04-25 ENCOUNTER — HOSPITAL ENCOUNTER (OUTPATIENT)
Dept: RADIOLOGY | Facility: HOSPITAL | Age: 32
Discharge: HOME | End: 2022-04-25
Payer: COMMERCIAL

## 2022-04-25 DIAGNOSIS — K76.9 LIVER LESION, RIGHT LOBE: ICD-10-CM

## 2022-04-25 PROCEDURE — A9581 GADOXETATE DISODIUM INJ: HCPCS | Performed by: PHYSICIAN ASSISTANT

## 2022-04-25 PROCEDURE — 74183 MRI ABD W/O CNTR FLWD CNTR: CPT

## 2022-04-25 PROCEDURE — G1004 CDSM NDSC: HCPCS

## 2022-04-25 RX ADMIN — GADOXETATE DISODIUM 10 ML: 181.43 INJECTION, SOLUTION INTRAVENOUS at 07:20

## 2022-05-02 ENCOUNTER — OFFICE VISIT (OUTPATIENT)
Dept: GASTROENTEROLOGY | Facility: CLINIC | Age: 32
End: 2022-05-02
Payer: COMMERCIAL

## 2022-05-02 VITALS
WEIGHT: 106.4 LBS | BODY MASS INDEX: 17.73 KG/M2 | TEMPERATURE: 99.2 F | HEIGHT: 65 IN | SYSTOLIC BLOOD PRESSURE: 90 MMHG | DIASTOLIC BLOOD PRESSURE: 50 MMHG

## 2022-05-02 DIAGNOSIS — K76.9 LIVER LESION, RIGHT LOBE: Primary | ICD-10-CM

## 2022-05-02 PROCEDURE — 99204 OFFICE O/P NEW MOD 45 MIN: CPT | Performed by: INTERNAL MEDICINE

## 2022-05-02 PROCEDURE — 3008F BODY MASS INDEX DOCD: CPT | Performed by: INTERNAL MEDICINE

## 2022-05-02 PROCEDURE — 1036F TOBACCO NON-USER: CPT | Performed by: INTERNAL MEDICINE

## 2022-05-02 NOTE — PATIENT INSTRUCTIONS
1   Have the blood work done at your earliest convenience  2   I will submit your imaging to be reviewed at our next Liver Mass Conference  3   I will be in touch via [email or web address] or phone with next plans  We'll schedule follow-up appointment at that time

## 2022-05-02 NOTE — PROGRESS NOTES
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 Adenoma  Plan to review films in multidisciplinary tumor conference:  Plan could be short term follow-up vs biopsy  Given that her symptoms are likely unrelated to the lesions, and that the lesions are < 5 cm, serial imaging may be appropriate  However, the growth somewhat worries me  I will be in touch with Derian Garcia after the case is reviewed  She will have updated CMP and AFP  Still having IBS-D symptoms, better now, but still occurs with increased stress  Weight fluctuating  Will follow-up with Miriam GONSALEZ  I asked Derian Garcia to keep a log of foods that may be triggers to abd pain/bloating/diarrhea  She feels the worst is possible cereal     FOLLOW-UP:  [facility], then schedule follow-up appointment according to plan  VISIT DIAGNOSES AND ORDERS:      1  Liver lesion, right lobe      Orders Placed This Encounter   Procedures    Comprehensive metabolic panel    AFP tumor marker     ______________________________________________________________________    SUBJECTIVE:           Ms  Matty Palmer is a 28 y o  female with medical history as outlined below, including multiple liver lesions noted when she was being worked up for abd pain (? Pancreatitis), who presents for initial Hepatology consult after last being seen by my colleague, Annette GONSALEZ in October  She had a follow-up MRI last week which showed:     LIVER:   Normal in size and morphology  Again shown are multiple arterially enhancing lesions      The largest which is located within segment IVb measures 1 5 x 1 4 cm (10, 50), previously measuring 1 4 x 1 2 cm  On the January 13, 2021 study, the lesion measured 0 9 x 0 7 cm    This lesion is barely perceptible on T2-weighted images and on the   unenhanced T1 sequence  On the delayed hepatocyte phase, the lesion fills in and becomes imperceptible      The lesion in segment 7/6 adjacent to the IVC measures 1 2 cm, unchanged  This lesion is also barely perceptible on T2-weighted images and not apparent on the unenhanced T1 images  On the 20 minute hepatobiliary phase, this lesion does not fill-in      A lesion in segment 7 at the dome of the liver (series 10 image 20) measures approximately 0 8 cm and is unchanged  This lesion fills in on the 20 minute images compatible with FNH      a 0 9 cm arterially enhancing lesion in segment 2 (10, 29) has increased in size, previously measuring 0 5 cm on the prior study, and not identified prior to the last study  This lesion is barely perceptible on T2-weighted images, not perceptible on T1   unenhanced images, and shows filling on hepatobiliary phase imaging compatible with FNH  There is no signal loss on out of phase images in any of these lesions to suggest fat content      Other scattered arterially enhancing foci are too small to characterize but are stable in size  The hepatic veins and portal veins are patent  Symptomatically, she has no new complaints at this time  She states she does have occasional epigastric pain which she feels comes on worse when she has increased stress  She does state she has occasional pain in the middle of her back, similar to what she had prior to having her gallbladder removed  She states her weight continues to fluctuate around 106-107 lbs range  She remains on birth control, however has transitioned to progestin only birth control           REVIEW OF SYSTEMS     Review of Systems   Constitutional: Negative for activity change, fatigue, fever and unexpected weight change  HENT: Negative for nosebleeds, sore throat and trouble swallowing  Eyes: Negative for pain and visual disturbance     Respiratory: Negative for chest tightness and shortness of breath  Cardiovascular: Negative for chest pain, palpitations and leg swelling  Gastrointestinal: Positive for abdominal pain (occasional epigastric, radiating to back)  Negative for abdominal distention, blood in stool, diarrhea and nausea  Endocrine: Negative  Negative for polydipsia and polyuria  Genitourinary: Negative  Negative for difficulty urinating, dysuria and frequency  Musculoskeletal: Negative for arthralgias and back pain  Skin: Positive for color change  Negative for rash  Allergic/Immunologic: Negative  Negative for food allergies  Neurological: Negative  Negative for dizziness, weakness, light-headedness and numbness  Hematological: Does not bruise/bleed easily  Psychiatric/Behavioral: Negative for sleep disturbance  The patient is not nervous/anxious          Historical Information   Patient Active Problem List   Diagnosis    Carpal tunnel syndrome    Esophageal reflux    Generalized headaches    Chronic migraine without aura without status migrainosus, not intractable    Well adult exam    Arthralgia    Anxiety    Chronic pancreatitis (HCC)    Other fatigue    Screening for tuberculosis    Need for influenza vaccination    Asthma    Enlarged liver    Liver lesion, right lobe    RLS (restless legs syndrome)     Social History     Substance and Sexual Activity   Alcohol Use Never    Comment: social alchol use (As per allscripts)     Social History     Substance and Sexual Activity   Drug Use No     Social History     Tobacco Use   Smoking Status Never Smoker   Smokeless Tobacco Never Used       Meds/Allergies       Current Outpatient Medications:     albuterol (PROVENTIL HFA,VENTOLIN HFA) 90 mcg/act inhaler    Drospirenone (Slynd) 4 MG TABS    loratadine (CLARITIN) 10 mg tablet    Magnesium 250 MG TABS    montelukast (SINGULAIR) 10 mg tablet    Multiple Vitamins-Minerals (WOMENS MULTIVITAMIN PO)    propranolol (INDERAL LA) 60 mg 24 hr capsule    Allergies   Allergen Reactions    Maxalt [Rizatriptan] Anaphylaxis    Other Rash     Surgical Glue     Zovia 1-35e (28) [Ethynodiol Diac-Eth Estradiol]      Liver Lesion     Molds & Smuts Cough           Objective     Blood pressure 90/50, temperature 99 2 °F (37 3 °C), temperature source Tympanic, height 5' 5" (1 651 m), weight 48 3 kg (106 lb 6 4 oz), not currently breastfeeding  Body mass index is 17 71 kg/m²  PHYSICAL EXAM:      Physical Exam  Vitals reviewed  Constitutional:       General: She is not in acute distress  Appearance: Normal appearance  She is not ill-appearing  Comments: thin   HENT:      Head: Normocephalic and atraumatic  Nose: Nose normal       Mouth/Throat:      Mouth: Mucous membranes are moist       Pharynx: Oropharynx is clear  Eyes:      General: No scleral icterus  Extraocular Movements: Extraocular movements intact  Cardiovascular:      Rate and Rhythm: Normal rate and regular rhythm  Heart sounds: No murmur heard  Pulmonary:      Effort: Pulmonary effort is normal  No respiratory distress  Breath sounds: Normal breath sounds  Abdominal:      General: Abdomen is flat  Palpations: Abdomen is soft  There is no shifting dullness, fluid wave, hepatomegaly or splenomegaly  Tenderness: There is no abdominal tenderness  Hernia: No hernia is present  Genitourinary:     Comments: deferred  Musculoskeletal:         General: No swelling  Normal range of motion  Cervical back: Normal range of motion and neck supple  No tenderness  Skin:     General: Skin is warm  Coloration: Skin is not jaundiced  Findings: No bruising or rash  Neurological:      General: No focal deficit present  Mental Status: She is alert and oriented to person, place, and time     Psychiatric:         Mood and Affect: Mood normal          Lab Results:   Lab Results   Component Value Date     06/11/2014    K 4 7 03/26/2022    CO2 29 03/26/2022     03/26/2022    BUN 15 03/26/2022    CREATININE 0 83 03/26/2022    GLUCOSE 66 06/11/2014     Lab Results   Component Value Date    WBC 5 20 09/03/2021    HGB 14 2 09/03/2021    HCT 44 9 09/03/2021    MCV 92 09/03/2021     09/03/2021     Lab Results   Component Value Date    TP 8 1 09/03/2021    AST 21 09/03/2021    ALT 33 09/03/2021    BILITOT 0 45 06/11/2014    INR 1 01 08/19/2014      Lab Results   Component Value Date    IRON 90 11/24/2020    FERRITIN 106 03/26/2022     Lab Results   Component Value Date    HDL 68 (H) 08/17/2019    TRIG 122 08/17/2019         Radiology Results:   MRI abdomen w wo contrast    Result Date: 5/1/2022  Narrative: MRI - ABDOMEN - WITH AND WITHOUT CONTRAST INDICATION: 28 years / Female  K76 9: Liver disease, unspecified  COMPARISON: TECHNIQUE:  The following pulse sequences were obtained prior to and following the administration of intravenous contrast:     Coronal and axial T2 with TE of 90 and 180 respectively, axial T2 with fat saturation, axial FIESTA fat-sat, axial T1-weighted in-and-out-of phase, axial DWI/ADC, precontrast axial T1 with fat saturation, post-contrast dynamic axial T1 with fat saturation at 20, 70, and 180 seconds, coronal T1  with fat saturation and 7 minute delayed axial T1 with fat saturation  IV Contrast:  10 mL of gadoxetate disodium SOLN FINDINGS: LOWER CHEST:   Unremarkable  LIVER: Normal in size and morphology  Again shown are multiple arterially enhancing lesions  The largest which is located within segment IVb measures 1 5 x 1 4 cm (10, 50), previously measuring 1 4 x 1 2 cm  On the January 13, 2021 study, the lesion measured 0 9 x 0 7 cm  This lesion is barely perceptible on T2-weighted images and on the unenhanced T1 sequence  On the delayed hepatocyte phase, the lesion fills in and becomes imperceptible  The lesion in segment 7/6 adjacent to the IVC measures 1 2 cm, unchanged    This lesion is also barely perceptible on T2-weighted images and not apparent on the unenhanced T1 images  On the 20 minute hepatobiliary phase, this lesion does not fill-in  A lesion in segment 7 at the dome of the liver (series 10 image 20) measures approximately 0 8 cm and is unchanged  This lesion fills in on the 20 minute images compatible with FNH  a 0 9 cm arterially enhancing lesion in segment 2 (10, 29) has increased in size, previously measuring 0 5 cm on the prior study, and not identified prior to the last study  This lesion is barely perceptible on T2-weighted images, not perceptible on T1 unenhanced images, and shows filling on hepatobiliary phase imaging compatible with FNH  There is no signal loss on out of phase images in any of these lesions to suggest fat content  Other scattered arterially enhancing foci are too small to characterize but are stable in size  The hepatic veins and portal veins are patent  BILE DUCTS: No intrahepatic or extrahepatic bile duct dilation  GALLBLADDER:  Absent PANCREAS:  Unremarkable  ADRENAL GLANDS:  Normal  SPLEEN:  Normal  KIDNEYS/PROXIMAL URETERS: No hydroureteronephrosis  No suspicious renal mass  BOWEL:   No dilated loops of bowel  PERITONEUM/RETROPERITONEUM: No mass  No ascites  LYMPH NODES: No abdominal lymphadenopathy  VASCULAR STRUCTURES:  No aneurysm  ABDOMINAL WALL:  Unremarkable  OSSEOUS STRUCTURES:  No suspicious osseous lesion  Impression: 1  Multiple arterially enhancing lesions in the liver as detailed above which all show fill in on the hepatobiliary phase except for the lesion in segment 7 adjacent to the IVC  The lesion in segment 4B continues to increase in size  There is an additional smaller image in segment 2 with the same imaging characteristics, which is also increasing in size  In the absence of cirrhosis or malignancy, these nodules most likely are benign and favored to represent focal nodular hyperplasia    If the patient is on oral contraceptives, this may have a trophic effect on FNHs and cause lesion growth  Consideration could be given to discontinuation of OCPs and follow-up imaging to assess for regression  Alternatively, continued imaging follow-up versus  biopsy is recommended  The study was marked in EPIC for significant notification   Workstation performed: [id number]         Hermes Raygoza MD

## 2022-05-21 DIAGNOSIS — R51.9 GENERALIZED HEADACHES: ICD-10-CM

## 2022-05-24 RX ORDER — PROPRANOLOL HCL 60 MG
CAPSULE, EXTENDED RELEASE 24HR ORAL
Qty: 90 CAPSULE | Refills: 3 | Status: SHIPPED | OUTPATIENT
Start: 2022-05-24

## 2022-05-24 NOTE — TELEPHONE ENCOUNTER
I have never seen this patient, followed by [facility] team   Last seen by Kerry Francis and Ivonne Gaines    Will refill in their absence

## 2022-06-02 ENCOUNTER — DOCUMENTATION (OUTPATIENT)
Dept: HEMATOLOGY ONCOLOGY | Facility: CLINIC | Age: 32
End: 2022-06-02

## 2022-06-02 DIAGNOSIS — N92.6 IRREGULAR BLEEDING: ICD-10-CM

## 2022-06-02 RX ORDER — DROSPIRENONE 4 MG/1
TABLET, FILM COATED ORAL
Qty: 84 TABLET | Refills: 1 | Status: SHIPPED | OUTPATIENT
Start: 2022-06-02

## 2022-06-02 NOTE — PROGRESS NOTES
RECTAL/GI MULTIDISCIPLINARY CASE REVIEW    DATE: 6/2/2022      PRESENTING DOCTOR: Dr Brianne Abel      DIAGNOSIS: Liver Lesion      Jessica Matthews was presented at the Rectal/GI Multidisciplinary Conference today  PHYSICIAN RECOMMENDED PLAN:    -Repeat MRI of the abdomen in one year to continue monitoring liver lesions  Team agreed to plan  The final treatment plan will be left to the discretion of the patient and the treating physician  DISCLAIMERS:  TO THE TREATING PHYSICIAN:  This conference is a meeting of clinicians from various specialty areas who evaluate and discuss patients for whom a multidisciplinary treatment approach is being considered  Please note that the above opinion was a consensus of the conference attendees and is intended only to assist in quality care of the discussed patient  The responsibility for follow up on the input given during the conference, along with any final decisions regarding plan of care, is that of the patient and the patient's provider  Accordingly, appointments have only been recommended based on this information and have NOT been scheduled unless otherwise noted  TO THE PATIENT:  This summary is a brief record of major aspects of your cancer treatment  You may choose to share a copy with any of your doctors or nurses  However, this is not a detailed or comprehensive record of your care        NCCN guidelines were readily available for review at this discussion

## 2022-06-13 ENCOUNTER — APPOINTMENT (OUTPATIENT)
Dept: LAB | Facility: CLINIC | Age: 32
End: 2022-06-13
Payer: COMMERCIAL

## 2022-06-13 DIAGNOSIS — E55.9 AVITAMINOSIS D: ICD-10-CM

## 2022-06-13 DIAGNOSIS — K76.9 LIVER LESION, RIGHT LOBE: ICD-10-CM

## 2022-06-13 LAB
25(OH)D3 SERPL-MCNC: 46.6 NG/ML (ref 30–100)
AFP-TM SERPL-MCNC: 33.3 NG/ML (ref 0.5–8)
ALBUMIN SERPL BCP-MCNC: 4 G/DL (ref 3.5–5)
ALP SERPL-CCNC: 63 U/L (ref 46–116)
ALT SERPL W P-5'-P-CCNC: 30 U/L (ref 12–78)
ANION GAP SERPL CALCULATED.3IONS-SCNC: 1 MMOL/L (ref 4–13)
AST SERPL W P-5'-P-CCNC: 20 U/L (ref 5–45)
BILIRUB SERPL-MCNC: 0.82 MG/DL (ref 0.2–1)
BUN SERPL-MCNC: 11 MG/DL (ref 5–25)
CALCIUM SERPL-MCNC: 9.4 MG/DL (ref 8.3–10.1)
CHLORIDE SERPL-SCNC: 107 MMOL/L (ref 100–108)
CO2 SERPL-SCNC: 29 MMOL/L (ref 21–32)
CREAT SERPL-MCNC: 0.72 MG/DL (ref 0.6–1.3)
GFR SERPL CREATININE-BSD FRML MDRD: 111 ML/MIN/1.73SQ M
GLUCOSE P FAST SERPL-MCNC: 87 MG/DL (ref 65–99)
POTASSIUM SERPL-SCNC: 4.3 MMOL/L (ref 3.5–5.3)
PROT SERPL-MCNC: 7.8 G/DL (ref 6.4–8.2)
SODIUM SERPL-SCNC: 137 MMOL/L (ref 136–145)

## 2022-06-13 PROCEDURE — 82306 VITAMIN D 25 HYDROXY: CPT

## 2022-06-13 PROCEDURE — 36415 COLL VENOUS BLD VENIPUNCTURE: CPT

## 2022-06-13 PROCEDURE — 80053 COMPREHEN METABOLIC PANEL: CPT

## 2022-06-13 PROCEDURE — 82105 ALPHA-FETOPROTEIN SERUM: CPT

## 2022-06-17 ENCOUNTER — TELEPHONE (OUTPATIENT)
Dept: SLEEP CENTER | Facility: CLINIC | Age: 32
End: 2022-06-17

## 2022-06-17 NOTE — TELEPHONE ENCOUNTER
----- Message from [facility] sent at 6/16/2022  2:26 PM EDT -----  Regarding: Ferritin Test  Hi Dr Marisa Mancini,    I went to get my Ferritin test earlier this week, on Tuesday  It was supposed to be the last time I got the Ferritin test done before my appointment with you on 6/29/2022  The phlebotomist said that my blood work had already been pulled four times and I couldn't use the order anymore  She said more than likely, one of the times I came to get the blood work, someone pulled it twice  She said I will need a new Ferritin order  Can you put in a new order for me so I can get the blood work done before my appointment with you?     Thank you,     Nina Kong

## 2022-06-25 DIAGNOSIS — D50.0 IRON DEFICIENCY ANEMIA DUE TO CHRONIC BLOOD LOSS: Primary | ICD-10-CM

## 2022-06-28 ENCOUNTER — APPOINTMENT (OUTPATIENT)
Dept: LAB | Facility: CLINIC | Age: 32
End: 2022-06-28
Payer: COMMERCIAL

## 2022-06-28 DIAGNOSIS — D50.0 IRON DEFICIENCY ANEMIA DUE TO CHRONIC BLOOD LOSS: ICD-10-CM

## 2022-06-28 LAB — FERRITIN SERPL-MCNC: 86 NG/ML (ref 8–388)

## 2022-06-28 PROCEDURE — 36415 COLL VENOUS BLD VENIPUNCTURE: CPT

## 2022-06-28 PROCEDURE — 82728 ASSAY OF FERRITIN: CPT

## 2022-06-29 ENCOUNTER — OFFICE VISIT (OUTPATIENT)
Dept: SLEEP CENTER | Facility: CLINIC | Age: 32
End: 2022-06-29
Payer: COMMERCIAL

## 2022-06-29 VITALS
BODY MASS INDEX: 17.26 KG/M2 | SYSTOLIC BLOOD PRESSURE: 98 MMHG | WEIGHT: 107.4 LBS | HEIGHT: 66 IN | HEART RATE: 80 BPM | DIASTOLIC BLOOD PRESSURE: 62 MMHG | OXYGEN SATURATION: 99 %

## 2022-06-29 DIAGNOSIS — D50.0 IRON DEFICIENCY ANEMIA DUE TO CHRONIC BLOOD LOSS: Primary | ICD-10-CM

## 2022-06-29 PROCEDURE — 3008F BODY MASS INDEX DOCD: CPT | Performed by: INTERNAL MEDICINE

## 2022-06-29 PROCEDURE — 99213 OFFICE O/P EST LOW 20 MIN: CPT | Performed by: INTERNAL MEDICINE
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
## 2022-06-29 NOTE — PROGRESS NOTES
Review of Systems      Genitourinary excessive blood loss during menses   Cardiology none   Gastrointestinal none   Neurology awaken with headache and poor concentration or confusion,    Constitutional fatigue   Integumentary none   Psychiatry anxiety and depression   Musculoskeletal joint pain and back pain   Pulmonary shortness of breath with activity, chest tightness and frequent cough   ENT throat clearing   Endocrine none   Hematological none

## 2022-07-01 ENCOUNTER — TELEPHONE (OUTPATIENT)
Dept: SLEEP CENTER | Facility: CLINIC | Age: 32
End: 2022-07-01

## 2022-08-08 ENCOUNTER — TELEPHONE (OUTPATIENT)
Dept: OBGYN CLINIC | Facility: CLINIC | Age: 32
End: 2022-08-08

## 2022-08-08 NOTE — TELEPHONE ENCOUNTER
----- Message from [address] sent at 8/8/2022 10:08 AM EDT -----  Regarding: Medication Question  Hi Dr Jessica Nunez,    I just started my last pill pack of my 3-month supply of Slynd  There are no refills left  My gastroenterologist recommended that I follow-up with you about my medication  He recommended that I go off BCP completely because of my liver lesions  He does acknowledge that pills with estrogen are the main issue with liver lesions though  This pill has been regulating my period  I do not want to be off a BCP completely  Do I need to schedule an appointment with you to discuss further or  should I just request a medication refill in about a week?      Thank you,    Fabian Garcia

## 2022-08-19 DIAGNOSIS — N92.6 IRREGULAR BLEEDING: ICD-10-CM

## 2022-08-19 RX ORDER — DROSPIRENONE 4 MG/1
TABLET, FILM COATED ORAL
Qty: 84 TABLET | Refills: 0 | Status: SHIPPED | OUTPATIENT
Start: 2022-08-19

## 2022-08-22 DIAGNOSIS — R51.9 GENERALIZED HEADACHES: ICD-10-CM

## 2022-08-22 RX ORDER — PROPRANOLOL HCL 60 MG
60 CAPSULE, EXTENDED RELEASE 24HR ORAL DAILY
Qty: 90 CAPSULE | Refills: 0 | Status: CANCELLED | OUTPATIENT
Start: 2022-08-22

## 2022-08-23 NOTE — TELEPHONE ENCOUNTER
Propranolol script 90 day supply with 3 refills was sent to [facility] pharmacy on 5/24/22  There are refills left

## 2022-09-22 ENCOUNTER — APPOINTMENT (OUTPATIENT)
Dept: LAB | Facility: CLINIC | Age: 32
End: 2022-09-22
Payer: COMMERCIAL

## 2022-09-22 DIAGNOSIS — D50.0 IRON DEFICIENCY ANEMIA DUE TO CHRONIC BLOOD LOSS: ICD-10-CM

## 2022-09-22 LAB — FERRITIN SERPL-MCNC: 91 NG/ML (ref 8–388)

## 2022-09-22 PROCEDURE — 82728 ASSAY OF FERRITIN: CPT

## 2022-09-22 PROCEDURE — 36415 COLL VENOUS BLD VENIPUNCTURE: CPT

## 2022-10-22 ENCOUNTER — IMMUNIZATIONS (OUTPATIENT)
Dept: FAMILY MEDICINE CLINIC | Facility: CLINIC | Age: 32
End: 2022-10-22
Payer: COMMERCIAL

## 2022-10-22 DIAGNOSIS — Z23 ENCOUNTER FOR IMMUNIZATION: Primary | ICD-10-CM

## 2022-10-22 PROCEDURE — 90471 IMMUNIZATION ADMIN: CPT

## 2022-10-22 PROCEDURE — 90686 IIV4 VACC NO PRSV 0.5 ML IM: CPT

## 2022-11-14 NOTE — TELEPHONE ENCOUNTER
I think it is okay to remain on the [facility], and she should just send request when due  I reviewed GI notes, and they agree on avoiding estrogen  Detail Level: Generalized Include Location In Plan?: No

## 2022-11-15 DIAGNOSIS — N92.6 IRREGULAR BLEEDING: ICD-10-CM

## 2022-11-15 RX ORDER — DROSPIRENONE 4 MG/1
TABLET, FILM COATED ORAL
Qty: 84 TABLET | Refills: 0 | Status: SHIPPED | OUTPATIENT
Start: 2022-11-15

## 2022-12-15 ENCOUNTER — TELEPHONE (OUTPATIENT)
Dept: SLEEP CENTER | Facility: CLINIC | Age: 32
End: 2022-12-15

## 2022-12-15 NOTE — TELEPHONE ENCOUNTER
----- Message from [address] sent at 12/15/2022 10:26 AM EST -----  Regarding: Ferritin  Contact: [phone]  Hi Dr Kurt Lew,    The last time I got my ferritin checked, the phlebotomist told me that it wasn't a standing order  The ferritin order is no longer in my MyChart  Can you put in another order? At my last doctor's appointment with you this summer, we said I would get the ferritin level checked every 3 months      Thank you,     Darion Carlton

## 2022-12-16 ENCOUNTER — OFFICE VISIT (OUTPATIENT)
Dept: FAMILY MEDICINE CLINIC | Facility: CLINIC | Age: 32
End: 2022-12-16

## 2022-12-16 VITALS
SYSTOLIC BLOOD PRESSURE: 90 MMHG | TEMPERATURE: 98 F | OXYGEN SATURATION: 98 % | BODY MASS INDEX: 17.76 KG/M2 | RESPIRATION RATE: 18 BRPM | HEIGHT: 66 IN | HEART RATE: 80 BPM | DIASTOLIC BLOOD PRESSURE: 76 MMHG | WEIGHT: 110.5 LBS

## 2022-12-16 DIAGNOSIS — F41.9 ANXIETY: Primary | ICD-10-CM
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
## 2022-12-18 NOTE — ASSESSMENT & PLAN NOTE
Anxiety  I had a long discussion with the patient regarding her situation  We will fill out her FMLA paperwork to take a period of time off from work so that she may decide if she would like to continue working at her current position

## 2022-12-28 DIAGNOSIS — D50.0 IRON DEFICIENCY ANEMIA DUE TO CHRONIC BLOOD LOSS: Primary | ICD-10-CM

## 2023-01-19 ENCOUNTER — TELEPHONE (OUTPATIENT)
Dept: NEUROLOGY | Facility: CLINIC | Age: 33
End: 2023-01-19

## 2023-01-19 NOTE — TELEPHONE ENCOUNTER
LMOM for pt to call office to r/s 3/8 appt  If pt calls please r/s appt for an earlier time on same day OR next availability with Mary

## 2023-01-30 ENCOUNTER — APPOINTMENT (OUTPATIENT)
Dept: URGENT CARE | Age: 33
End: 2023-01-30

## 2023-01-30 ENCOUNTER — APPOINTMENT (OUTPATIENT)
Dept: LAB | Age: 33
End: 2023-01-30

## 2023-01-30 DIAGNOSIS — D50.0 IRON DEFICIENCY ANEMIA DUE TO CHRONIC BLOOD LOSS: ICD-10-CM

## 2023-01-30 LAB — FERRITIN SERPL-MCNC: 119 NG/ML (ref 8–388)

## 2023-02-07 ENCOUNTER — RA CDI HCC (OUTPATIENT)
Dept: OTHER | Facility: HOSPITAL | Age: 33
End: 2023-02-07

## 2023-02-07 DIAGNOSIS — N92.6 IRREGULAR BLEEDING: ICD-10-CM

## 2023-02-07 NOTE — PROGRESS NOTES
AsthmaNoted 6/12/2019  [J45 [id number]]      NOT on the BPA- please assess using MEAT for 2023 billing    [facility] [address]  coding opportunities          Chart Reviewed number of suggestions sent to Provider: 1     Patients Insurance        Commercial Insurance: [employer]

## 2023-02-08 RX ORDER — DROSPIRENONE 4 MG/1
1 TABLET, FILM COATED ORAL DAILY
Qty: 84 TABLET | Refills: 0 | Status: SHIPPED | OUTPATIENT
Start: 2023-02-08

## 2023-02-13 DIAGNOSIS — R51.9 GENERALIZED HEADACHES: ICD-10-CM

## 2023-02-13 RX ORDER — PROPRANOLOL HCL 60 MG
60 CAPSULE, EXTENDED RELEASE 24HR ORAL DAILY
Qty: 90 CAPSULE | Refills: 3 | Status: SHIPPED | OUTPATIENT
Start: 2023-02-13

## 2023-02-13 NOTE — TELEPHONE ENCOUNTER
Recd gerson:    Hello, this is [employer]  My YOB: 1990, a return phone call  Number back is [phone]  I need to refill propranolol 60 milligrams  I do have one refill remaining, that the problem is I need it [facility]  because I have new insurance because I started a new job  If you have further questions, you can give me a call back  Thank you so much bye  Patient of [facility], last visit 3/4/2022    I called patient to acknowledge , reached , left message  f/u scheduled 3/8 with tara Reeves    Please sign script to transfer to [facility] if in agreement, thank you

## 2023-02-14 ENCOUNTER — OFFICE VISIT (OUTPATIENT)
Dept: FAMILY MEDICINE CLINIC | Facility: CLINIC | Age: 33
End: 2023-02-14

## 2023-02-14 VITALS
DIASTOLIC BLOOD PRESSURE: 78 MMHG | HEART RATE: 86 BPM | OXYGEN SATURATION: 99 % | HEIGHT: 66 IN | RESPIRATION RATE: 16 BRPM | SYSTOLIC BLOOD PRESSURE: 96 MMHG | BODY MASS INDEX: 18.06 KG/M2 | TEMPERATURE: 98.4 F | WEIGHT: 112.4 LBS

## 2023-02-14 DIAGNOSIS — Z00.00 WELL ADULT EXAM: ICD-10-CM

## 2023-02-14 DIAGNOSIS — Z11.1 TUBERCULOSIS SCREENING: Primary | ICD-10-CM

## 2023-02-14 NOTE — ASSESSMENT & PLAN NOTE
Well adult  Overall the patient appears to be in stable health  Her physical exam form was filled out  A PPD was placed on her forearm and she will return to the office in 48 to 72 hours to recheck results
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
## 2023-02-15 ENCOUNTER — TELEPHONE (OUTPATIENT)
Dept: OBGYN CLINIC | Facility: CLINIC | Age: 33
End: 2023-02-15

## 2023-03-03 ENCOUNTER — TELEPHONE (OUTPATIENT)
Dept: NEUROLOGY | Facility: CLINIC | Age: 33
End: 2023-03-03

## 2023-03-08 ENCOUNTER — TELEPHONE (OUTPATIENT)
Dept: GASTROENTEROLOGY | Facility: CLINIC | Age: 33
End: 2023-03-08

## 2023-03-08 ENCOUNTER — OFFICE VISIT (OUTPATIENT)
Dept: NEUROLOGY | Facility: CLINIC | Age: 33
End: 2023-03-08

## 2023-03-08 VITALS
HEART RATE: 84 BPM | OXYGEN SATURATION: 98 % | HEIGHT: 66 IN | SYSTOLIC BLOOD PRESSURE: 106 MMHG | WEIGHT: 113.5 LBS | TEMPERATURE: 98.4 F | BODY MASS INDEX: 18.24 KG/M2 | DIASTOLIC BLOOD PRESSURE: 72 MMHG

## 2023-03-08 DIAGNOSIS — K76.9 LIVER LESION, RIGHT LOBE: Primary | ICD-10-CM

## 2023-03-08 DIAGNOSIS — F41.9 ANXIETY: ICD-10-CM

## 2023-03-08 DIAGNOSIS — K86.1 CHRONIC PANCREATITIS, UNSPECIFIED PANCREATITIS TYPE (HCC): ICD-10-CM

## 2023-03-08 DIAGNOSIS — G43.709 CHRONIC MIGRAINE WITHOUT AURA WITHOUT STATUS MIGRAINOSUS, NOT INTRACTABLE: Primary | ICD-10-CM
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
## 2023-03-08 NOTE — PATIENT INSTRUCTIONS
Tension-type headaches:  Paulina Pulido is experiencing anywhere between 1-2 tension type headaches per week or sometimes 1-2 tension type headaches per month  These are different in nature than her migraines  Tension type headaches are located at the bifrontal area on both sides of the head  Will most often occur when the patient does not have the best sleep at night, is dehydrated, not eating the best or due to significant stress/anxiety   -Would encourage her at this time to take Tylenol as needed for these mild to moderate headaches  She is at the point that dealing with migraines for so long she does not always need to take Tylenol for these minor headaches   -Would recommend adequate sleep, proper hydration(which would be anywhere between 64 to 72 ounces of water per day), that the amount of caffeine she has, and try to limit every day life stress/anxiety as well  -These headaches ever become worsening she is to let me know and we will proceed with additional work-up  Chronic migraines:    Patient Instructions:    Headache Calendar  Please maintain a headache calendar  Consider using phone applications such as Migraine Buddy or Migraine Diary    Headache/migraine treatment:     Rescue medications (for immediate treatment of a headache): It is ok to take ibuprofen, acetaminophen or naproxen (Advil, Tylenol,  Aleve, Excedrin) if they help your headaches you should limit these to No more than 3 times a week to avoid medication overuse/rebound headaches  Prescription preventive medications for headaches/migraines   (to take every day to help prevent headaches - not to take at the time of headache):  [x] Propranolol LA 60 mg, take 1 tablet (60 mg) by mouth once daily for migraine prevention/anxiety treatment  - Continue with magnesium at this time for migraine preventative treatment as well      *Typically these types of medications take time until you see the benefit, although some may see improvement in days, often it may take weeks, especially if the medication is being titrated up to a beneficial level  Please contact us if there are any concerns or questions regarding the medication  Over the counter preventive supplements for headaches/migraines (if you try, try for 3 months straight)  (to take every day to help prevent headaches - not to take at the time of headache): There are combo pills online of these - none of which regulated by FDA and double check dosing - take appropriate dose only once a day- prevent a migraine, migravent, mind ease, migrelief   [x] Magnesium 400mg daily (If any diarrhea or upset stomach, decrease dose  as tolerated)  [] Riboflavin (Vitamin B2) 400mg daily (may make your urine bright/neon yellow)    Lifestyle Recommendations:  [x] SLEEP - Maintain a regular sleep schedule: Adults need at least 7-8 hours of uninterrupted a night  Maintain good sleep hygiene:  Going to bed and waking up at consistent times, avoiding excessive daytime naps, avoiding caffeinated beverages in the evening, avoid excessive stimulation in the evening and generally using bed primarily for sleeping  One hour before bedtime would recommend turning lights down lower, decreasing your activity (may read quietly, listen to music at a low volume)  When you get into bed, should eliminate all technology (no texting, emailing, playing with your phone, iPad or tablet in bed)  [x] HYDRATION - Maintain good hydration  Drink  2L of fluid a day (4 typical small water bottles)  [x] DIET - Maintain good nutrition  In particular don't skip meals and try and eat healthy balanced meals regularly  [x] TRIGGERS - Look for other triggers and avoid them: Limit caffeine to 1-2 cups a day or less  Avoid dietary triggers that you have noticed bring on your headaches (this could include aged cheese, peanuts, MSG, aspartame and nitrates)    [x] EXERCISE - physical exercise as we all know is good for you in many ways, and not only is good for your heart, but also is beneficial for your mental health, cognitive health and  chronic pain/headaches  I would encourage at the least 5 days of physical exercise weekly for at least 30 minutes  Education and Follow-up  [x] Please call with any questions or concerns  Of course if any new concerning symptoms go to the emergency department    [x] Follow up 1 year with Katherine Alejandro PA-C

## 2023-03-08 NOTE — PROGRESS NOTES
Review of Systems   Constitutional: Negative  Negative for appetite change and fever  HENT: Negative  Negative for hearing loss, tinnitus, trouble swallowing and voice change  Eyes: Positive for visual disturbance (single black dot/floater)  Negative for photophobia and pain  Respiratory: Negative  Negative for shortness of breath  Cardiovascular: Negative  Negative for palpitations  Gastrointestinal: Positive for nausea  Negative for vomiting  Endocrine: Negative  Negative for cold intolerance  Genitourinary: Negative  Negative for dysuria, frequency and urgency  Musculoskeletal: Negative  Negative for gait problem, myalgias and neck pain  Skin: Negative  Negative for rash  Allergic/Immunologic: Negative  Neurological: Positive for headaches  Negative for dizziness, tremors, seizures, syncope, facial asymmetry, speech difficulty, weakness, light-headedness and numbness  Hematological: Negative  Does not bruise/bleed easily  Psychiatric/Behavioral: Negative  Negative for confusion, hallucinations and sleep disturbance  All other systems reviewed and are negative

## 2023-03-08 NOTE — TELEPHONE ENCOUNTER
Patients GI provider:  Dr Jos Shell    Number to return call: [phone]    Reason for call: Pt calling asking if she should scheduled her MRI for liver lesion  Pt is also asking when she should follow up with Dr Iva Sullivan as well      Scheduled procedure/appointment date if applicable: N/A

## 2023-03-09 NOTE — TELEPHONE ENCOUNTER
Clinical - please contact patient to schedule 1 year repeat MRI in April (complete BMP prior to imaging) and schedule follow up after imaging is completed    Thank you

## 2023-03-13 DIAGNOSIS — N92.6 IRREGULAR BLEEDING: ICD-10-CM

## 2023-03-13 NOTE — TELEPHONE ENCOUNTER
----- Message from [address] sent at 3/12/2023  7:30 PM EDT -----  Regarding: Clement Denied  Contact: [phone]  Hi Dr Carmen Flores,    I got a message from [facility] that [facility] was denied  I changed jobs and insurance in February  On the back and the bottom of the paper from [address], it says my doctor can submit a clinical exception  Based on my health issues, I think I should qualify for a clinical exception  Would you be able to contact [employer] and explain that this medication is medically necessary because estrogen pills cause my liver lesions and I need to be on a BCP to control my periods or I would end up with iron-deficiency anemia? I personally don't trust another medication  I've tried many medications that either tie in with my liver lesions or the medications don't regulate my period  Clement has been working for me, so I do not want to change that  I know we would discuss further at my yearly appointment, but that is more than a month away  A member of the clinical team can call me if needed  Thank you!     Reynold Lin

## 2023-03-14 NOTE — TELEPHONE ENCOUNTER
Called pt and she states she tried to schedule MRI but had to go,on hold to long and will try again and will get labs done a week prior to MRI

## 2023-03-15 RX ORDER — DROSPIRENONE 4 MG/1
1 TABLET, FILM COATED ORAL DAILY
Qty: 84 TABLET | Refills: 0 | Status: SHIPPED | OUTPATIENT
Start: 2023-03-15

## 2023-04-27 ENCOUNTER — ANNUAL EXAM (OUTPATIENT)
Dept: OBGYN CLINIC | Facility: CLINIC | Age: 33
End: 2023-04-27
Payer: COMMERCIAL

## 2023-04-27 VITALS
WEIGHT: 114.6 LBS | HEIGHT: 65 IN | BODY MASS INDEX: 19.09 KG/M2 | DIASTOLIC BLOOD PRESSURE: 72 MMHG | SYSTOLIC BLOOD PRESSURE: 92 MMHG

## 2023-04-27 DIAGNOSIS — Z01.419 ENCNTR FOR GYN EXAM (GENERAL) (ROUTINE) W/O ABN FINDINGS: Primary | ICD-10-CM

## 2023-04-27 DIAGNOSIS — N92.6 IRREGULAR BLEEDING: ICD-10-CM

## 2023-04-27 PROCEDURE — S0612 ANNUAL GYNECOLOGICAL EXAMINA: HCPCS | Performed by: OBSTETRICS & GYNECOLOGY

## 2023-04-27 RX ORDER — DROSPIRENONE 4 MG/1
1 TABLET, FILM COATED ORAL DAILY
Qty: 84 TABLET | Refills: 4 | Status: SHIPPED | OUTPATIENT
Start: 2023-04-27
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
## 2023-04-29 ENCOUNTER — APPOINTMENT (OUTPATIENT)
Dept: LAB | Facility: CLINIC | Age: 33
End: 2023-04-29

## 2023-04-29 DIAGNOSIS — K86.1 CHRONIC PANCREATITIS, UNSPECIFIED PANCREATITIS TYPE (HCC): ICD-10-CM

## 2023-04-29 DIAGNOSIS — K76.9 LIVER LESION, RIGHT LOBE: ICD-10-CM

## 2023-04-29 LAB
ANION GAP SERPL CALCULATED.3IONS-SCNC: 2 MMOL/L (ref 4–13)
BUN SERPL-MCNC: 14 MG/DL (ref 5–25)
CALCIUM SERPL-MCNC: 9 MG/DL (ref 8.3–10.1)
CHLORIDE SERPL-SCNC: 109 MMOL/L (ref 96–108)
CO2 SERPL-SCNC: 26 MMOL/L (ref 21–32)
CREAT SERPL-MCNC: 0.77 MG/DL (ref 0.6–1.3)
GFR SERPL CREATININE-BSD FRML MDRD: 101 ML/MIN/1.73SQ M
GLUCOSE P FAST SERPL-MCNC: 88 MG/DL (ref 65–99)
POTASSIUM SERPL-SCNC: 4.2 MMOL/L (ref 3.5–5.3)
SODIUM SERPL-SCNC: 137 MMOL/L (ref 135–147)

## 2023-05-01 ENCOUNTER — HOSPITAL ENCOUNTER (OUTPATIENT)
Dept: RADIOLOGY | Facility: HOSPITAL | Age: 33
Discharge: HOME | End: 2023-05-01
Attending: INTERNAL MEDICINE

## 2023-05-01 DIAGNOSIS — K76.9 LIVER LESION, RIGHT LOBE: ICD-10-CM

## 2023-05-01 DIAGNOSIS — K86.1 CHRONIC PANCREATITIS, UNSPECIFIED PANCREATITIS TYPE (HCC): ICD-10-CM

## 2023-05-01 RX ADMIN — GADOXETATE DISODIUM 10 ML: 181.43 INJECTION, SOLUTION INTRAVENOUS at 18:34

## 2023-05-04 ENCOUNTER — DOCUMENTATION (OUTPATIENT)
Dept: HEMATOLOGY ONCOLOGY | Facility: CLINIC | Age: 33
End: 2023-05-04

## 2023-05-17 ENCOUNTER — DOCUMENTATION (OUTPATIENT)
Dept: HEMATOLOGY ONCOLOGY | Facility: CLINIC | Age: 33
End: 2023-05-17

## 2023-05-17 NOTE — PROGRESS NOTES
Chart reviewed in preparation of Upper GI Tumor Conference presentation by Dr Valeriy Mata  Patient established care with GI  for liver lesions

## 2023-05-18 ENCOUNTER — DOCUMENTATION (OUTPATIENT)
Dept: HEMATOLOGY ONCOLOGY | Facility: CLINIC | Age: 33
End: 2023-05-18

## 2023-05-18 NOTE — PROGRESS NOTES
RECTAL/GI MULTIDISCIPLINARY CASE REVIEW    DATE: 5/18/23      PRESENTING DOCTOR: Dr Magdi Brennan      DIAGNOSIS:       Mauricio Pinzon was presented at the Rectal/GI Multidisciplinary Conference today  PHYSICIAN RECOMMENDED PLAN:    - The recommended plan is for surveillance with repeat MRI in 6 months  Patient has a follow up appointment with Dr Magdi Brennan on 6/9/23  Team agreed to plan  The final treatment plan will be left to the discretion of the patient and the treating physician  DISCLAIMERS:  TO THE TREATING PHYSICIAN:  This conference is a meeting of clinicians from various specialty areas who evaluate and discuss patients for whom a multidisciplinary treatment approach is being considered  Please note that the above opinion was a consensus of the conference attendees and is intended only to assist in quality care of the discussed patient  The responsibility for follow up on the input given during the conference, along with any final decisions regarding plan of care, is that of the patient and the patient's provider  Accordingly, appointments have only been recommended based on this information and have NOT been scheduled unless otherwise noted  TO THE PATIENT:  This summary is a brief record of major aspects of your cancer treatment  You may choose to share a copy with any of your doctors or nurses  However, this is not a detailed or comprehensive record of your care        NCCN guidelines were readily available for review at this discussion

## 2023-05-23 DIAGNOSIS — R51.9 GENERALIZED HEADACHES: ICD-10-CM

## 2023-05-23 RX ORDER — PROPRANOLOL HCL 60 MG
60 CAPSULE, EXTENDED RELEASE 24HR ORAL DAILY
Qty: 7 CAPSULE | Refills: 0 | Status: SHIPPED | OUTPATIENT
Start: 2023-05-23 | End: 2023-08-14 | Stop reason: SDUPTHER

## 2023-06-05 ENCOUNTER — TELEPHONE (OUTPATIENT)
Dept: GASTROENTEROLOGY | Facility: CLINIC | Age: 33
End: 2023-06-05

## 2023-06-05 NOTE — TELEPHONE ENCOUNTER
Called patient, she will me call back on 6/6 in the AM so that we can schedule next available appt with Dr Sharath Grant

## 2023-06-06 ENCOUNTER — TELEPHONE (OUTPATIENT)
Dept: GASTROENTEROLOGY | Facility: CLINIC | Age: 33
End: 2023-06-06

## 2023-06-06 NOTE — TELEPHONE ENCOUNTER
----- Message from Roman Stoner MD sent at 6/5/2023  8:25 AM EDT -----  Regarding: FW: Upcoming Appointment  Contact: [phone]    Can we accommodate her request for a follow-up appointment? 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  Either way, please call her  Thanks    V  ----- Message -----  From: Delaney Smith  Sent: 6/5/2023   8:22 AM EDT  To: Roman Stoner MD  Subject: FW: Upcoming Appointment                           ----- Message -----  From: Antoinette Mendenhall  Sent: 6/2/2023   4:25 PM EDT  To: [facility]  Subject: Upcoming Appointment                             Hi Dr Helio Infante,     I am available from 1PM-2:30PM on Thursday, 6/8 or Friday, 6/9 after 1 PM  Please let me know if you have any other openings so I can let my boss know when I need to take off  If that doesn't work, I'll call back the office to reschedule 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

## 2023-07-18 ENCOUNTER — OFFICE VISIT (OUTPATIENT)
Dept: GASTROENTEROLOGY | Facility: CLINIC | Age: 33
End: 2023-07-18
Payer: COMMERCIAL

## 2023-07-18 VITALS
WEIGHT: 117.4 LBS | HEIGHT: 65 IN | BODY MASS INDEX: 19.56 KG/M2 | TEMPERATURE: 97.5 F | SYSTOLIC BLOOD PRESSURE: 112 MMHG | DIASTOLIC BLOOD PRESSURE: 70 MMHG

## 2023-07-18 DIAGNOSIS — K58.9 IRRITABLE BOWEL SYNDROME, UNSPECIFIED TYPE: ICD-10-CM

## 2023-07-18 DIAGNOSIS — K86.1 CHRONIC PANCREATITIS, UNSPECIFIED PANCREATITIS TYPE (HCC): ICD-10-CM

## 2023-07-18 DIAGNOSIS — K76.9 LIVER LESION, RIGHT LOBE: Primary | ICD-10-CM

## 2023-07-18 PROCEDURE — 99214 OFFICE O/P EST MOD 30 MIN: CPT | Performed by: INTERNAL MEDICINE

## 2023-07-18 NOTE — PATIENT INSTRUCTIONS
Blood work in early November. MRI in second week of November. MRI scheduled on 11/6 at [facility].

## 2023-07-18 NOTE — PROGRESS NOTES
[facility] - Outpatient Follow-up Note  Angelina Max 35 y.o. female MRN: [id number]  Encounter: [id number]          ASSESSMENT AND PLAN:      Multiple Liver Lesions:    Most recent MRI done in May reviewed at [address] No clear evidence of adenoma, as most do appear as FNH. However 2 lesions are still somewhat indeterminate with one slightly larger compared to previous. Recommended plan for 6 month follow-up. Will plan for repeat CMP, AFP and MRI in November. IBS-D symptoms resolved with better lifestyle, less stress, mostly associated with new employment and more time since passing of her cat, with resumption of yoga/pilates. Weight slowly increasing. Patient eating and tolerating more food. Encouraged to continue. VISIT DIAGNOSES AND ORDERS:      1. Liver lesion, right lobe    2. Chronic pancreatitis, unspecified pancreatitis type ([address])    3. Irritable bowel syndrome, unspecified type      Orders Placed This Encounter   Procedures   • MRI abdomen w wo contrast   • Comprehensive metabolic panel   • AFP tumor marker     ______________________________________________________________________    SUBJECTIVE:           Ms. Angelina Max is a 35 y.o. female with medical history as outlined below, including multiple liver lesions who returns for follow-up. Last MRI in May. Images reviewed in Tumor Board. MRIMRCP 5/1/23: IMPRESSION:     Continued mild interval increase in the size of hepatic segment 4 lesion since 1/13/2021, likely representing FNH.     A 0.6 cm hepatic segment 8 lesion which is hypointense on delayed hepatobiliary phase. Retrospectively this was also visualized on 4/25/2022, however not definitely seen on 12/10/2021 or 1/13/2021.  It is incompletely characterized on this examination but   overall stability in size for 1 year favors a benign lesion such as hepatic adenoma.     Grossly stable 2 additional hepatic lesions compared to 1/13/2021, representing benign lesions as characterized in the body of the report. Nonvisualization of known hepatic segment 2 lesion on current examination, likely secondary to motion artifact and   peripheral location of the lesion, limiting evaluation. Symptomatically, she is doing much better. IBS symptoms nearly resolved with improvement in stressors:  New job, better coping and more time since passing of her cat, resumption of exercise, improved diet. Weight has improved. She remains on  progestin only birth control. Ok to continue. REVIEW OF SYSTEMS     Review of Systems   Constitutional: Negative for activity change, fatigue, fever and unexpected weight change. HENT: Negative for nosebleeds, sore throat and trouble swallowing. Eyes: Negative for pain and visual disturbance. Respiratory: Negative for chest tightness and shortness of breath. Cardiovascular: Negative for chest pain, palpitations and leg swelling. Gastrointestinal: Negative for abdominal distention, abdominal pain, blood in stool, diarrhea and nausea. Endocrine: Negative. Negative for polydipsia and polyuria. Genitourinary: Negative. Negative for difficulty urinating, dysuria and frequency. Musculoskeletal: Negative for arthralgias and back pain. Skin: Negative for color change and rash. Allergic/Immunologic: Negative. Negative for food allergies. Neurological: Negative. Negative for dizziness, weakness, light-headedness and numbness. Hematological: Does not bruise/bleed easily. Psychiatric/Behavioral: Negative for sleep disturbance. The patient is not nervous/anxious.         Historical Information   Patient Active Problem List   Diagnosis   • Carpal tunnel syndrome   • Esophageal reflux   • Generalized headaches   • Chronic migraine without aura without status migrainosus, not intractable   • Well adult exam   • Arthralgia   • Anxiety   • Chronic pancreatitis ([address])   • Other fatigue   • Screening for tuberculosis   • Need for influenza vaccination   • Asthma   • Enlarged liver   • Liver lesion, right lobe   • RLS (restless legs syndrome)     Social History     Substance and Sexual Activity   Alcohol Use Never    Comment: social alchol use (As per allscripts)     Social History     Substance and Sexual Activity   Drug Use No     Social History     Tobacco Use   Smoking Status Never   Smokeless Tobacco Never       Meds/Allergies       Current Outpatient Medications:   •  albuterol (PROVENTIL HFA,VENTOLIN HFA) 90 mcg/act inhaler  •  loratadine (CLARITIN) 10 mg tablet  •  Magnesium 250 MG TABS  •  montelukast (SINGULAIR) 10 mg tablet  •  Multiple Vitamins-Minerals (WOMENS MULTIVITAMIN PO)  •  propranolol (INDERAL LA) 60 mg 24 hr capsule  •  Slynd 4 MG TABS    Allergies   Allergen Reactions   • Maxalt [Rizatriptan] Anaphylaxis   • Other Rash     Surgical Glue    • Zovia 1-35e (28) [Ethynodiol Diac-Eth Estradiol]      Liver Lesion    • Molds & Smuts Cough           Objective     Blood pressure 112/70, temperature 97.5 °F (36.4 °C), temperature source Tympanic, height 5' 5" (1.651 m), weight 53.3 kg (117 lb 6.4 oz), not currently breastfeeding. Body mass index is 19.54 kg/m². PHYSICAL EXAM:      Physical Exam  Vitals reviewed. Constitutional:       General: She is not in acute distress. Appearance: Normal appearance. She is not ill-appearing. Comments: thin   HENT:      Head: Normocephalic and atraumatic. Nose: Nose normal.      Mouth/Throat:      Mouth: Mucous membranes are moist.      Pharynx: Oropharynx is clear. Eyes:      General: No scleral icterus. Extraocular Movements: Extraocular movements intact. Cardiovascular:      Rate and Rhythm: Normal rate and regular rhythm. Heart sounds: No murmur heard. Pulmonary:      Effort: Pulmonary effort is normal. No respiratory distress. Breath sounds: Normal breath sounds.    Abdominal:      General: Abdomen is flat. Palpations: Abdomen is soft. There is no shifting dullness, fluid wave, hepatomegaly or splenomegaly. Tenderness: There is no abdominal tenderness. Hernia: No hernia is present. Genitourinary:     Comments: deferred  Musculoskeletal:         General: No swelling. Normal range of motion. Cervical back: Normal range of motion and neck supple. No tenderness. Skin:     General: Skin is warm. Coloration: Skin is not jaundiced. Findings: No bruising or rash. Neurological:      General: No focal deficit present. Mental Status: She is alert and oriented to person, place, and time. Psychiatric:         Mood and Affect: Mood normal.         Lab Results:   Lab Results   Component Value Date     06/11/2014    K 4.2 04/29/2023    CO2 26 04/29/2023     (H) 04/29/2023    BUN 14 04/29/2023    CREATININE 0.77 04/29/2023    GLUCOSE 66 06/11/2014     Lab Results   Component Value Date    WBC 5.20 09/03/2021    HGB 14.2 09/03/2021    HCT 44.9 09/03/2021    MCV 92 09/03/2021     09/03/2021     Lab Results   Component Value Date    TP 7.8 06/13/2022    AST 20 06/13/2022    ALT 30 06/13/2022    BILITOT 0.45 06/11/2014    INR 1.01 08/19/2014      Lab Results   Component Value Date    IRON 90 11/24/2020    FERRITIN 119 01/30/2023     Lab Results   Component Value Date    HDL 68 (H) 08/17/2019    TRIG 122 08/17/2019         Radiology Results:   MRI abdomen w wo contrast    Result Date: 5/1/2022  Narrative: MRI - ABDOMEN - WITH AND WITHOUT CONTRAST INDICATION: 28 years / Female  K76.9: Liver disease, unspecified.  COMPARISON: TECHNIQUE:  The following pulse sequences were obtained prior to and following the administration of intravenous contrast:     Coronal and axial T2 with TE of 90 and 180 respectively, axial T2 with fat saturation, axial FIESTA fat-sat, axial T1-weighted in-and-out-of phase, axial DWI/ADC, precontrast axial T1 with fat saturation, post-contrast dynamic axial T1 with fat saturation at 20, 70, and 180 seconds, coronal T1  with fat saturation and 7 minute delayed axial T1 with fat saturation. IV Contrast:  10 mL of gadoxetate disodium SOLN FINDINGS: LOWER CHEST:   Unremarkable. LIVER: Normal in size and morphology. Again shown are multiple arterially enhancing lesions. The largest which is located within segment IVb measures 1.5 x 1.4 cm (10, 50), previously measuring 1.4 x 1.2 cm. On the January 13, 2021 study, the lesion measured 0.9 x 0.7 cm. This lesion is barely perceptible on T2-weighted images and on the unenhanced T1 sequence. On the delayed hepatocyte phase, the lesion fills in and becomes imperceptible. The lesion in segment 7/6 adjacent to the IVC measures 1.2 cm, unchanged. This lesion is also barely perceptible on T2-weighted images and not apparent on the unenhanced T1 images. On the 20 minute hepatobiliary phase, this lesion does not fill-in. A lesion in segment 7 at the dome of the liver (series 10 image 20) measures approximately 0.8 cm and is unchanged. This lesion fills in on the 20 minute images compatible with FNH. a 0.9 cm arterially enhancing lesion in segment 2 (10, 29) has increased in size, previously measuring 0.5 cm on the prior study, and not identified prior to the last study. This lesion is barely perceptible on T2-weighted images, not perceptible on T1 unenhanced images, and shows filling on hepatobiliary phase imaging compatible with FNH. There is no signal loss on out of phase images in any of these lesions to suggest fat content. Other scattered arterially enhancing foci are too small to characterize but are stable in size. The hepatic veins and portal veins are patent. BILE DUCTS: No intrahepatic or extrahepatic bile duct dilation. GALLBLADDER:  Absent PANCREAS:  Unremarkable. ADRENAL GLANDS:  Normal. SPLEEN:  Normal. KIDNEYS/PROXIMAL URETERS: No hydroureteronephrosis. No suspicious renal mass.  BOWEL:   No dilated loops of bowel. PERITONEUM/RETROPERITONEUM: No mass. No ascites. LYMPH NODES: No abdominal lymphadenopathy. VASCULAR STRUCTURES:  No aneurysm. ABDOMINAL WALL:  Unremarkable. OSSEOUS STRUCTURES:  No suspicious osseous lesion. Impression: 1. Multiple arterially enhancing lesions in the liver as detailed above which all show fill in on the hepatobiliary phase except for the lesion in segment 7 adjacent to the IVC. The lesion in segment 4B continues to increase in size. There is an additional smaller image in segment 2 with the same imaging characteristics, which is also increasing in size. In the absence of cirrhosis or malignancy, these nodules most likely are benign and favored to represent focal nodular hyperplasia. If the patient is on oral contraceptives, this may have a trophic effect on FNHs and cause lesion growth. Consideration could be given to discontinuation of OCPs and follow-up imaging to assess for regression. Alternatively, continued imaging follow-up versus  biopsy is recommended. The study was marked in EPIC for significant notification.  Workstation performed: [id number]         Mio Thorne MD

## 2023-08-11 ENCOUNTER — TELEPHONE (OUTPATIENT)
Dept: NEUROLOGY | Facility: CLINIC | Age: 33
End: 2023-08-11

## 2023-08-11 DIAGNOSIS — R51.9 GENERALIZED HEADACHES: ICD-10-CM

## 2023-08-11 NOTE — TELEPHONE ENCOUNTER
Patient called in and stating that she had called in [facility], she had no meds left of propranolol and there was a order called into [facility] to hold her over, since that took place [facility] has a hold on her script until provider calls in to authorize for medication refill to be released.  It could either be done on line or faxed over to [facility]

## 2023-08-14 RX ORDER — PROPRANOLOL HCL 60 MG
60 CAPSULE, EXTENDED RELEASE 24HR ORAL DAILY
Qty: 90 CAPSULE | Refills: 3 | Status: SHIPPED | OUTPATIENT
Start: 2023-08-14

## 2023-08-14 NOTE — TELEPHONE ENCOUNTER
90-day prescription of propanolol 60 mg, take 1 capsule by mouth once daily sent to the pharmacy.     Delbert Patterson PA-C  08/14/2023

## 2023-08-14 NOTE — TELEPHONE ENCOUNTER
Last visit Paul Powell 3/8; please send script for 90 day supply of propranolol [facility] if in agreement, thank you.

## 2023-10-03 DIAGNOSIS — K76.9 LIVER LESION, RIGHT LOBE: Primary | ICD-10-CM

## 2023-10-21 ENCOUNTER — IMMUNIZATIONS (OUTPATIENT)
Dept: FAMILY MEDICINE CLINIC | Facility: CLINIC | Age: 33
End: 2023-10-21
Payer: COMMERCIAL

## 2023-10-21 DIAGNOSIS — Z23 ENCOUNTER FOR IMMUNIZATION: Primary | ICD-10-CM

## 2023-10-21 PROCEDURE — 90686 IIV4 VACC NO PRSV 0.5 ML IM: CPT

## 2023-10-21 PROCEDURE — 90471 IMMUNIZATION ADMIN: CPT

## 2023-10-28 ENCOUNTER — LAB (OUTPATIENT)
Dept: LAB | Facility: CLINIC | Age: 33
End: 2023-10-28
Payer: COMMERCIAL

## 2023-10-28 DIAGNOSIS — K76.9 LIVER LESION, RIGHT LOBE: ICD-10-CM

## 2023-10-28 DIAGNOSIS — K58.9 IRRITABLE BOWEL SYNDROME, UNSPECIFIED TYPE: ICD-10-CM

## 2023-10-28 LAB
AFP-TM SERPL-MCNC: 16.87 NG/ML (ref 0–9)
ALBUMIN SERPL BCP-MCNC: 4.5 G/DL (ref 3.5–5)
ALP SERPL-CCNC: 57 U/L (ref 34–104)
ALT SERPL W P-5'-P-CCNC: 24 U/L (ref 7–52)
ANION GAP SERPL CALCULATED.3IONS-SCNC: 10 MMOL/L
AST SERPL W P-5'-P-CCNC: 24 U/L (ref 13–39)
BILIRUB SERPL-MCNC: 0.94 MG/DL (ref 0.2–1)
BUN SERPL-MCNC: 11 MG/DL (ref 5–25)
CALCIUM SERPL-MCNC: 9.3 MG/DL (ref 8.4–10.2)
CHLORIDE SERPL-SCNC: 105 MMOL/L (ref 96–108)
CO2 SERPL-SCNC: 26 MMOL/L (ref 21–32)
CREAT SERPL-MCNC: 0.73 MG/DL (ref 0.6–1.3)
FERRITIN SERPL-MCNC: 66 NG/ML (ref 11–307)
GFR SERPL CREATININE-BSD FRML MDRD: 108 ML/MIN/1.73SQ M
GLUCOSE P FAST SERPL-MCNC: 88 MG/DL (ref 65–99)
POTASSIUM SERPL-SCNC: 4.1 MMOL/L (ref 3.5–5.3)
PROT SERPL-MCNC: 7.2 G/DL (ref 6.4–8.4)
SODIUM SERPL-SCNC: 141 MMOL/L (ref 135–147)

## 2023-10-28 PROCEDURE — 82105 ALPHA-FETOPROTEIN SERUM: CPT

## 2023-10-28 PROCEDURE — 36415 COLL VENOUS BLD VENIPUNCTURE: CPT

## 2023-10-28 PROCEDURE — 80053 COMPREHEN METABOLIC PANEL: CPT

## 2023-10-28 PROCEDURE — 82728 ASSAY OF FERRITIN: CPT

## 2023-10-30 ENCOUNTER — TELEPHONE (OUTPATIENT)
Dept: FAMILY MEDICINE CLINIC | Facility: CLINIC | Age: 33
End: 2023-10-30

## 2023-10-30 NOTE — RESULT ENCOUNTER NOTE
Dr. Dionne Smith MD,  Ms. Mary Hudson liver labs results were normal and she has been notified via [address] [place] [address].

## 2023-10-30 NOTE — TELEPHONE ENCOUNTER
----- Message from Clarene Duverney, MD sent at 06/42/4142  6:19 AM EDT -----  Recent ferritin level was in normal range

## 2023-10-31 DIAGNOSIS — E61.1 IRON DEFICIENCY: Primary | ICD-10-CM

## 2023-11-06 ENCOUNTER — HOSPITAL ENCOUNTER (OUTPATIENT)
Dept: RADIOLOGY | Facility: HOSPITAL | Age: 33
Discharge: HOME | End: 2023-11-06
Attending: INTERNAL MEDICINE
Payer: COMMERCIAL

## 2023-11-06 DIAGNOSIS — K58.9 IRRITABLE BOWEL SYNDROME, UNSPECIFIED TYPE: ICD-10-CM

## 2023-11-06 DIAGNOSIS — K76.9 LIVER LESION, RIGHT LOBE: ICD-10-CM

## 2023-11-06 PROCEDURE — 74183 MRI ABD W/O CNTR FLWD CNTR: CPT

## 2023-11-06 PROCEDURE — G1004 CDSM NDSC: HCPCS

## 2023-11-06 PROCEDURE — A9581 GADOXETATE DISODIUM INJ: HCPCS | Performed by: INTERNAL MEDICINE

## 2023-11-06 RX ADMIN — GADOXETATE DISODIUM 10 ML: 181.43 INJECTION, SOLUTION INTRAVENOUS at 18:29

## 2023-11-13 ENCOUNTER — TELEPHONE (OUTPATIENT)
Age: 33
End: 2023-11-13

## 2023-11-13 NOTE — TELEPHONE ENCOUNTER
Patients GI provider:  Dr. David Molina    Number to return call:  [phone]    Reason for call: Pt calling for MRI results and follow up appt / Mary Lemonck to scheduled follow up but it is a bit out / explained I will send to office for sooner availability / Also explained I would I the office call her back due to needing that sooner appt      Scheduled procedure/appointment date if applicable: 0/32/0351 - Dr. David Molina

## 2024-01-22 ENCOUNTER — OFFICE VISIT (OUTPATIENT)
Dept: GASTROENTEROLOGY | Facility: CLINIC | Age: 34
End: 2024-01-22
Payer: COMMERCIAL

## 2024-01-22 VITALS
TEMPERATURE: 97.6 F | OXYGEN SATURATION: 99 % | HEART RATE: 73 BPM | WEIGHT: 128 LBS | BODY MASS INDEX: 21.33 KG/M2 | HEIGHT: 65 IN | SYSTOLIC BLOOD PRESSURE: 108 MMHG | DIASTOLIC BLOOD PRESSURE: 70 MMHG

## 2024-01-22 DIAGNOSIS — K76.9 LIVER LESION, RIGHT LOBE: ICD-10-CM

## 2024-01-22 DIAGNOSIS — R53.82 CHRONIC FATIGUE: Primary | ICD-10-CM

## 2024-01-22 DIAGNOSIS — K58.9 IRRITABLE BOWEL SYNDROME, UNSPECIFIED TYPE: ICD-10-CM

## 2024-01-22 DIAGNOSIS — Z11.59 ENCOUNTER FOR SCREENING FOR OTHER VIRAL DISEASES: ICD-10-CM

## 2024-01-22 PROCEDURE — 99214 OFFICE O/P EST MOD 30 MIN: CPT | Performed by: INTERNAL MEDICINE

## 2024-01-22 NOTE — PROGRESS NOTES
[facility] - Outpatient Follow-up Note  Jessica Wu 33 y.o. female MRN: [id number]  Encounter: [id number]          ASSESSMENT AND PLAN:      Multiple Liver Lesions:    I personally reviewed her most recent MRI which shows that her liver lesions noted on previous imaging are all benign.  She has several foci within has been characteristics of a fibrosing nodular hyperplasia as well as a benign segment 8 hemangioma.  Several subcentimeter foci are also noted that can either be vascular shunt or small FNH.    I reassured  that these are not worrisome and do not require ongoing serial imaging in the absence of symptoms.    Repeat alpha-fetoprotein has been decreasing, and as such in the absence of any liver directed treatment is very unlikely to represent primary liver tumor.    IBS-D symptoms  Occurs infrequently and periods of significant stress.  Will continue symptomatic management as previously discussed.  Patient aware to continue high-fiber foods and supplement with bulking agent in times of stress.  Given the limited nature of these bouts, she does not require Bentyl or Imodium at this time.    Fatigue   is worried about iron deficiency anemia.  Her ferritin is still within normal range.  She has not had a CBC done in 3 years.  She has not had thyroid studies done in a few years.  I have requested a CBC, thyroid panel and request she follows up with her PCP.  Iron studies have already been ordered by a different provider.    Viral Hepatitis Screening:  Patient has not had hepatitis screening and questions possible need for vaccination.  I have requested an acute hepatitis panel and hepatitis a and B immunity testing.  If results show that she is not immune, she has been instructed to pursue vaccination through PCP office or alternatively I can call in a prescription to her preferred pharmacy.    I have spent a total time of 32 minutes on 01/22/24 in caring for this  patient including Diagnostic results, Patient and family education, Impressions, Counseling / Coordination of care, Documenting in the medical record, and Reviewing / ordering tests, medicine, procedures  .      VISIT DIAGNOSES AND ORDERS:      1. Chronic fatigue    2. Encounter for screening for other viral diseases    3. Liver lesion, right lobe    4. Irritable bowel syndrome, unspecified type      Orders Placed This Encounter   Procedures    CBC    TSH, 3rd generation with Free T4 reflex    Hepatitis A antibody, total    Hepatitis B surface antibody    Hepatitis panel, acute     ______________________________________________________________________    SUBJECTIVE:         Interval update 1/22/2024:  At her last office visit, I requested updated CMP, AFP and a repeat MRI in November.  She had all the studies done and comes in today for further discussion of results and ongoing management plan.    Fortunately, in the office today she states she feels relatively well but continues to have chronic fatigue.  She is mostly concerned about her iron levels.  She states she is not having significant menstruation bleeding and yet her ferritin has somewhat decreased from 119-66.    She denies any evidence of GI bleeding.    She continues to have occasional bouts of diarrhea, associated with times of stress.    Ms. Wu denies recent or history of yellow eyes/skin, dark urine, GI bleeding, abdominal distention with fluid, lower extremity swelling, easy bruising, excessive bleeding, pruritus or confusion.  she denies abdominal pain, nausea, vomiting, heartburn, reflux, difficulty swallowing, early satiety, bloating, constipation or straining with passing stools.          History:    HPI from office visit on July 18, 2023:  Ms. Jessica Wu is a 33 y.o. female with medical history as outlined below, including multiple liver lesions who returns for follow-up.  Last MRI in May.  Images reviewed in Tumor Board.    MRIMRCP  5/1/23:    IMPRESSION:     Continued mild interval increase in the size of hepatic segment 4 lesion since 1/13/2021, likely representing FNH.     A 0.6 cm hepatic segment 8 lesion which is hypointense on delayed hepatobiliary phase. Retrospectively this was also visualized on 4/25/2022, however not definitely seen on 12/10/2021 or 1/13/2021. It is incompletely characterized on this examination but   overall stability in size for 1 year favors a benign lesion such as hepatic adenoma.     Grossly stable 2 additional hepatic lesions compared to 1/13/2021, representing benign lesions as characterized in the body of the report. Nonvisualization of known hepatic segment 2 lesion on current examination, likely secondary to motion artifact and   peripheral location of the lesion, limiting evaluation.    Symptomatically, she is doing much better.  IBS symptoms nearly resolved with improvement in stressors:  New job, better coping and more time since passing of her cat, resumption of exercise, improved diet.    Weight has improved.    She remains on  progestin only birth control.  Ok to continue.           REVIEW OF SYSTEMS     Review of Systems   Constitutional:  Positive for fatigue. Negative for activity change, fever and unexpected weight change.   HENT:  Negative for nosebleeds, sore throat and trouble swallowing.    Eyes:  Negative for pain and visual disturbance.   Respiratory:  Negative for chest tightness and shortness of breath.    Cardiovascular:  Negative for chest pain, palpitations and leg swelling.   Gastrointestinal:  Positive for diarrhea (Occasional bouts). Negative for abdominal distention, abdominal pain, blood in stool and nausea.   Endocrine: Negative.  Negative for polydipsia and polyuria.   Genitourinary: Negative.  Negative for difficulty urinating, dysuria and frequency.   Musculoskeletal:  Negative for arthralgias and back pain.   Skin:  Negative for color change and rash.   Allergic/Immunologic:  "Negative.  Negative for food allergies.   Neurological: Negative.  Negative for dizziness, weakness, light-headedness and numbness.   Hematological:  Does not bruise/bleed easily.   Psychiatric/Behavioral:  Negative for sleep disturbance. The patient is not nervous/anxious.        Historical Information   Patient Active Problem List   Diagnosis    Carpal tunnel syndrome    Esophageal reflux    Generalized headaches    Chronic migraine without aura without status migrainosus, not intractable    Well adult exam    Arthralgia    Anxiety    Chronic pancreatitis (HCC)    Other fatigue    Screening for tuberculosis    Need for influenza vaccination    Asthma    Enlarged liver    Liver lesion, right lobe    RLS (restless legs syndrome)     Social History     Substance and Sexual Activity   Alcohol Use Never    Comment: social alchol use (As per allscripts)     Social History     Substance and Sexual Activity   Drug Use No     Social History     Tobacco Use   Smoking Status Never   Smokeless Tobacco Never       Meds/Allergies       Current Outpatient Medications:     albuterol (PROVENTIL HFA,VENTOLIN HFA) 90 mcg/act inhaler    loratadine (CLARITIN) 10 mg tablet    Magnesium 250 MG TABS    montelukast (SINGULAIR) 10 mg tablet    Multiple Vitamins-Minerals (WOMENS MULTIVITAMIN PO)    propranolol (INDERAL LA) 60 mg 24 hr capsule    Slynd 4 MG TABS    Allergies   Allergen Reactions    Maxalt [Rizatriptan] Anaphylaxis    Other Rash     Surgical Glue     Zovia 1-35e (28) [Ethynodiol Diac-Eth Estradiol]      Liver Lesion     Molds & Smuts Cough           Objective     Blood pressure 108/70, pulse 73, temperature 97.6 °F (36.4 °C), temperature source Tympanic, height 5' 5\" (1.651 m), weight 58.1 kg (128 lb), SpO2 99%, not currently breastfeeding. Body mass index is 21.3 kg/m².      PHYSICAL EXAM:      Physical Exam  Vitals reviewed.   Constitutional:       General: She is not in acute distress.     Appearance: Normal appearance. " She is not ill-appearing.      Comments: thin   HENT:      Head: Normocephalic and atraumatic.      Nose: Nose normal.      Mouth/Throat:      Mouth: Mucous membranes are moist.      Pharynx: Oropharynx is clear.   Eyes:      General: No scleral icterus.     Extraocular Movements: Extraocular movements intact.   Cardiovascular:      Rate and Rhythm: Normal rate and regular rhythm.      Heart sounds: No murmur heard.  Pulmonary:      Effort: Pulmonary effort is normal. No respiratory distress.      Breath sounds: Normal breath sounds.   Abdominal:      General: Abdomen is flat.      Palpations: Abdomen is soft. There is no shifting dullness, fluid wave, hepatomegaly or splenomegaly.      Tenderness: There is no abdominal tenderness.      Hernia: No hernia is present.   Genitourinary:     Comments: deferred  Musculoskeletal:         General: No swelling. Normal range of motion.      Cervical back: Normal range of motion and neck supple. No tenderness.   Skin:     General: Skin is warm.      Coloration: Skin is not jaundiced.      Findings: No bruising or rash.   Neurological:      General: No focal deficit present.      Mental Status: She is alert and oriented to person, place, and time.   Psychiatric:         Mood and Affect: Mood normal.         Lab Results:   Lab Results   Component Value Date     06/11/2014    K 4.1 10/28/2023    CO2 26 10/28/2023     10/28/2023    BUN 11 10/28/2023    CREATININE 0.73 10/28/2023    GLUCOSE 66 06/11/2014     Lab Results   Component Value Date    WBC 5.20 09/03/2021    HGB 14.2 09/03/2021    HCT 44.9 09/03/2021    MCV 92 09/03/2021     09/03/2021     Lab Results   Component Value Date    TP 7.2 10/28/2023    AST 24 10/28/2023    ALT 24 10/28/2023    BILITOT 0.45 06/11/2014    INR 1.01 08/19/2014      Lab Results   Component Value Date    IRON 90 11/24/2020    FERRITIN 66 10/28/2023     Lab Results   Component Value Date    HDL 68 (H) 08/17/2019    TRIG 122  08/17/2019         Radiology Results:   MRI abdomen w wo contrast    Result Date: 5/1/2022  Narrative: MRI - ABDOMEN - WITH AND WITHOUT CONTRAST INDICATION: 32 years / Female  K76.9: Liver disease, unspecified. COMPARISON: TECHNIQUE:  The following pulse sequences were obtained prior to and following the administration of intravenous contrast:     Coronal and axial T2 with TE of 90 and 180 respectively, axial T2 with fat saturation, axial FIESTA fat-sat, axial T1-weighted in-and-out-of phase, axial DWI/ADC, precontrast axial T1 with fat saturation, post-contrast dynamic axial T1 with fat saturation at 20, 70, and 180 seconds, coronal T1  with fat saturation and 7 minute delayed axial T1 with fat saturation. IV Contrast:  10 mL of gadoxetate disodium SOLN FINDINGS: LOWER CHEST:   Unremarkable. LIVER: Normal in size and morphology. Again shown are multiple arterially enhancing lesions. The largest which is located within segment IVb measures 1.5 x 1.4 cm (10, 50), previously measuring 1.4 x 1.2 cm.  On the January 13, 2021 study, the lesion measured 0.9 x 0.7 cm.  This lesion is barely perceptible on T2-weighted images and on the unenhanced T1 sequence.  On the delayed hepatocyte phase, the lesion fills in and becomes imperceptible. The lesion in segment 7/6 adjacent to the IVC measures 1.2 cm, unchanged.  This lesion is also barely perceptible on T2-weighted images and not apparent on the unenhanced T1 images.  On the 20 minute hepatobiliary phase, this lesion does not fill-in. A lesion in segment 7 at the dome of the liver (series 10 image 20) measures approximately 0.8 cm and is unchanged.  This lesion fills in on the 20 minute images compatible with FNH. a 0.9 cm arterially enhancing lesion in segment 2 (10, 29) has increased in size, previously measuring 0.5 cm on the prior study, and not identified prior to the last study.  This lesion is barely perceptible on T2-weighted images, not perceptible on T1 unenhanced  images, and shows filling on hepatobiliary phase imaging compatible with FNH. There is no signal loss on out of phase images in any of these lesions to suggest fat content. Other scattered arterially enhancing foci are too small to characterize but are stable in size. The hepatic veins and portal veins are patent. BILE DUCTS: No intrahepatic or extrahepatic bile duct dilation. GALLBLADDER:  Absent PANCREAS:  Unremarkable. ADRENAL GLANDS:  Normal. SPLEEN:  Normal. KIDNEYS/PROXIMAL URETERS: No hydroureteronephrosis. No suspicious renal mass. BOWEL:   No dilated loops of bowel. PERITONEUM/RETROPERITONEUM: No mass.  No ascites. LYMPH NODES: No abdominal lymphadenopathy. VASCULAR STRUCTURES:  No aneurysm. ABDOMINAL WALL:  Unremarkable. OSSEOUS STRUCTURES:  No suspicious osseous lesion.     Impression: 1.  Multiple arterially enhancing lesions in the liver as detailed above which all show fill in on the hepatobiliary phase except for the lesion in segment 7 adjacent to the IVC.  The lesion in segment 4B continues to increase in size.  There is an additional smaller image in segment 2 with the same imaging characteristics, which is also increasing in size.  In the absence of cirrhosis or malignancy, these nodules most likely are benign and favored to represent focal nodular hyperplasia.  If the patient is on oral contraceptives, this may have a trophic effect on FNHs and cause lesion growth.  Consideration could be given to discontinuation of OCPs and follow-up imaging to assess for regression.  Alternatively, continued imaging follow-up versus  biopsy is recommended. The study was marked in EPIC for significant notification. Workstation performed: [id number]         Francisco Hartmann MD

## 2024-02-06 ENCOUNTER — APPOINTMENT (OUTPATIENT)
Dept: LAB | Facility: CLINIC | Age: 34
End: 2024-02-06
Payer: COMMERCIAL

## 2024-02-06 DIAGNOSIS — E61.1 IRON DEFICIENCY: ICD-10-CM

## 2024-02-06 DIAGNOSIS — R53.82 CHRONIC FATIGUE: ICD-10-CM

## 2024-02-06 LAB
ERYTHROCYTE [DISTWIDTH] IN BLOOD BY AUTOMATED COUNT: 12.6 % (ref 11.6–15.1)
HAV AB SER QL IA: NORMAL
HAV IGM SER QL: NORMAL
HBV CORE IGM SER QL: NORMAL
HBV SURFACE AB SER-ACNC: 69.1 MIU/ML
HBV SURFACE AG SER QL: NORMAL
HCT VFR BLD AUTO: 47.1 % (ref 34.8–46.1)
HCV AB SER QL: NORMAL
HGB BLD-MCNC: 14.8 G/DL (ref 11.5–15.4)
IRON SERPL-MCNC: 100 UG/DL (ref 50–212)
MCH RBC QN AUTO: 28.6 PG (ref 26.8–34.3)
MCHC RBC AUTO-ENTMCNC: 31.4 G/DL (ref 31.4–37.4)
MCV RBC AUTO: 91 FL (ref 82–98)
PLATELET # BLD AUTO: 191 THOUSANDS/UL (ref 149–390)
PMV BLD AUTO: 12.6 FL (ref 8.9–12.7)
RBC # BLD AUTO: 5.18 MILLION/UL (ref 3.81–5.12)
TSH SERPL DL<=0.05 MIU/L-ACNC: 1.65 UIU/ML (ref 0.45–4.5)
WBC # BLD AUTO: 5.26 THOUSAND/UL (ref 4.31–10.16)

## 2024-02-06 PROCEDURE — 85027 COMPLETE CBC AUTOMATED: CPT

## 2024-02-06 PROCEDURE — 83540 ASSAY OF IRON: CPT

## 2024-02-06 PROCEDURE — 86708 HEPATITIS A ANTIBODY: CPT

## 2024-02-06 PROCEDURE — 84443 ASSAY THYROID STIM HORMONE: CPT

## 2024-02-06 PROCEDURE — 86706 HEP B SURFACE ANTIBODY: CPT

## 2024-02-06 PROCEDURE — 36415 COLL VENOUS BLD VENIPUNCTURE: CPT

## 2024-02-06 PROCEDURE — 80074 ACUTE HEPATITIS PANEL: CPT

## 2024-02-07 ENCOUNTER — TELEPHONE (OUTPATIENT)
Dept: FAMILY MEDICINE CLINIC | Facility: CLINIC | Age: 34
End: 2024-02-07

## 2024-02-07 NOTE — TELEPHONE ENCOUNTER
----- Message from Alan Martin MD sent at 2/7/2024  6:10 AM EST -----  Recent blood work shows iron level stable at 100.  She does have hepatitis B immunity but does not have immunity for hepatitis A.  If she would like to initiate hepatitis A vaccine she may make a nurse visit in our office.  She would receive initial hep A vaccination followed by repeat vaccine in 6 months

## 2024-02-21 PROBLEM — Z00.00 WELL ADULT EXAM: Status: RESOLVED | Noted: 2019-04-01 | Resolved: 2024-02-21

## 2024-02-21 PROBLEM — Z11.1 SCREENING FOR TUBERCULOSIS: Status: RESOLVED | Noted: 2020-11-03 | Resolved: 2024-02-21

## 2024-03-20 ENCOUNTER — TELEPHONE (OUTPATIENT)
Age: 34
End: 2024-03-20

## 2024-03-20 NOTE — TELEPHONE ENCOUNTER
Bria Farris, a representative from [employer] reports that patient's insurance will not cover her birth control Slynd.  She is suggesting the medication that is covered is Norethrindrone (Shawna) tabs: 28 dose: 0.35 mg.   New script can be faxed or sent electronically. fax: [phone].  Call back number is [phone].  Ref # [id number].    Can we get this changed for the patient as representative is stating that is needs to be in by the end of the day today?

## 2024-03-20 NOTE — TELEPHONE ENCOUNTER
PA for CHARLOTTED Approved   Date(s) approved February 19, 2024 to March 20, 2025       Patient advised by [x] [employer] Message                      [x] Phone call       Pharmacy advised by [x]Fax                                     []Phone call    Approval letter scanned into [employer] not available at the time of decision

## 2024-03-20 NOTE — TELEPHONE ENCOUNTER
PA for Claribelynd    Submitted via    []CMM-KEY   [x]Kellee[employer]Alta-Case ID #     [id number]     []Faxed to plan   []Other website   []Phone call Case ID #     Office notes sent, clinical questions answered. Awaiting determination    Turnaround time for your insurance to make a decision on your Prior Authorization can take 7-21 business days.

## 2024-03-22 ENCOUNTER — OFFICE VISIT (OUTPATIENT)
Dept: FAMILY MEDICINE CLINIC | Facility: CLINIC | Age: 34
End: 2024-03-22
Payer: COMMERCIAL

## 2024-03-22 VITALS
HEIGHT: 65 IN | RESPIRATION RATE: 18 BRPM | WEIGHT: 125 LBS | HEART RATE: 82 BPM | OXYGEN SATURATION: 98 % | BODY MASS INDEX: 20.83 KG/M2 | DIASTOLIC BLOOD PRESSURE: 80 MMHG | TEMPERATURE: 98 F | SYSTOLIC BLOOD PRESSURE: 100 MMHG

## 2024-03-22 DIAGNOSIS — Z23 ENCOUNTER FOR IMMUNIZATION: ICD-10-CM

## 2024-03-22 DIAGNOSIS — F41.9 ANXIETY: ICD-10-CM

## 2024-03-22 DIAGNOSIS — E61.1 IRON DEFICIENCY: Primary | ICD-10-CM

## 2024-03-22 DIAGNOSIS — G43.709 CHRONIC MIGRAINE WITHOUT AURA WITHOUT STATUS MIGRAINOSUS, NOT INTRACTABLE: ICD-10-CM

## 2024-03-22 PROBLEM — R16.0 ENLARGED LIVER: Status: RESOLVED | Noted: 2020-08-07 | Resolved: 2024-03-22

## 2024-03-22 PROBLEM — G25.81 RLS (RESTLESS LEGS SYNDROME): Status: RESOLVED | Noted: 2021-07-26 | Resolved: 2024-03-22

## 2024-03-22 PROBLEM — R51.9 GENERALIZED HEADACHES: Status: RESOLVED | Noted: 2018-02-15 | Resolved: 2024-03-22

## 2024-03-22 PROBLEM — K86.1 CHRONIC PANCREATITIS (HCC): Status: RESOLVED | Noted: 2020-07-08 | Resolved: 2024-03-22

## 2024-03-22 PROBLEM — M25.50 ARTHRALGIA: Status: RESOLVED | Noted: 2019-08-16 | Resolved: 2024-03-22

## 2024-03-22 PROCEDURE — 99213 OFFICE O/P EST LOW 20 MIN: CPT | Performed by: FAMILY MEDICINE

## 2024-03-22 PROCEDURE — 90632 HEPA VACCINE ADULT IM: CPT

## 2024-03-22 PROCEDURE — 90471 IMMUNIZATION ADMIN: CPT

## 2024-03-22 RX ORDER — FERROUS SULFATE 324(65)MG
324 TABLET, DELAYED RELEASE (ENTERIC COATED) ORAL DAILY
Qty: 90 TABLET | Refills: 3 | Status: SHIPPED | OUTPATIENT
Start: 2024-03-22
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
## 2024-03-24 NOTE — ASSESSMENT & PLAN NOTE
Headaches.  Patient feels her headaches have been more manageable since being on propranolol medication

## 2024-03-24 NOTE — ASSESSMENT & PLAN NOTE
Immunization.  Patient received initial dose of hepatitis A vaccination.  She will repeat dose in 6 months

## 2024-05-02 ENCOUNTER — ANNUAL EXAM (OUTPATIENT)
Age: 34
End: 2024-05-02
Payer: COMMERCIAL

## 2024-05-02 VITALS
HEIGHT: 65 IN | WEIGHT: 124 LBS | BODY MASS INDEX: 20.66 KG/M2 | DIASTOLIC BLOOD PRESSURE: 64 MMHG | SYSTOLIC BLOOD PRESSURE: 114 MMHG

## 2024-05-02 DIAGNOSIS — Z01.419 ROUTINE GYNECOLOGICAL EXAMINATION: Primary | ICD-10-CM

## 2024-05-02 PROCEDURE — S0612 ANNUAL GYNECOLOGICAL EXAMINA: HCPCS | Performed by: OBSTETRICS & GYNECOLOGY
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
## 2024-05-17 DIAGNOSIS — N92.6 IRREGULAR BLEEDING: ICD-10-CM

## 2024-05-17 RX ORDER — DROSPIRENONE 4 MG/1
1 TABLET, FILM COATED ORAL DAILY
Qty: 84 TABLET | Refills: 3 | Status: SHIPPED | OUTPATIENT
Start: 2024-05-17

## 2024-06-25 ENCOUNTER — APPOINTMENT (OUTPATIENT)
Dept: LAB | Facility: CLINIC | Age: 34
End: 2024-06-25
Payer: COMMERCIAL

## 2024-06-25 DIAGNOSIS — E61.1 IRON DEFICIENCY: ICD-10-CM

## 2024-06-25 LAB
FERRITIN SERPL-MCNC: 84 NG/ML (ref 11–307)
IRON SATN MFR SERPL: 27 % (ref 15–50)
IRON SERPL-MCNC: 89 UG/DL (ref 50–212)
TIBC SERPL-MCNC: 326 UG/DL (ref 250–450)
UIBC SERPL-MCNC: 237 UG/DL (ref 155–355)

## 2024-06-25 PROCEDURE — 36415 COLL VENOUS BLD VENIPUNCTURE: CPT

## 2024-06-25 PROCEDURE — 83540 ASSAY OF IRON: CPT

## 2024-06-25 PROCEDURE — 82728 ASSAY OF FERRITIN: CPT

## 2024-06-25 PROCEDURE — 83550 IRON BINDING TEST: CPT

## 2024-07-22 ENCOUNTER — TELEPHONE (OUTPATIENT)
Dept: NEUROLOGY | Facility: CLINIC | Age: 34
End: 2024-07-22

## 2024-07-22 DIAGNOSIS — R51.9 GENERALIZED HEADACHES: ICD-10-CM

## 2024-07-23 RX ORDER — PROPRANOLOL HCL 60 MG
60 CAPSULE, EXTENDED RELEASE 24HR ORAL DAILY
Qty: 90 CAPSULE | Refills: 3 | OUTPATIENT
Start: 2024-07-23

## 2024-07-24 DIAGNOSIS — R51.9 GENERALIZED HEADACHES: ICD-10-CM

## 2024-07-24 RX ORDER — PROPRANOLOL HCL 60 MG
60 CAPSULE, EXTENDED RELEASE 24HR ORAL DAILY
Qty: 90 CAPSULE | Refills: 3 | Status: SHIPPED | OUTPATIENT
Start: 2024-07-24

## 2024-08-08 NOTE — TELEPHONE ENCOUNTER
PCP refilled med.  Per [email or web address] message to PCP office-PCP will be refilling as pt's migraines are under control

## 2024-09-30 ENCOUNTER — CLINICAL SUPPORT (OUTPATIENT)
Dept: FAMILY MEDICINE CLINIC | Facility: CLINIC | Age: 34
End: 2024-09-30
Payer: COMMERCIAL

## 2024-09-30 DIAGNOSIS — Z23 ENCOUNTER FOR IMMUNIZATION: Primary | ICD-10-CM

## 2024-09-30 PROCEDURE — 90632 HEPA VACCINE ADULT IM: CPT | Performed by: FAMILY MEDICINE

## 2024-09-30 PROCEDURE — 90471 IMMUNIZATION ADMIN: CPT | Performed by: FAMILY MEDICINE

## 2024-10-15 NOTE — TELEPHONE ENCOUNTER
Pt aware  thanks [FreeTextEntry1] : 37 y old F with PMH of mild Asthma tx as needed  Albuterol and Seasonal allergies , HIV Tx HAART since 2016 followed regularly compliant no history of opportunistic infection last VL not detectable , with L Sided chest pain 8//24 seen at [facility] ED with evaluation Cardiac CT showed mild left pleural effusion no pericardial effusion no PE , tx NSAID improved , evaluated by pulmonary Dr Kee with workup had + DURGA 1:160 speckled with low positive RNP 1.1 , does have small joint arthralgia with BL hand since age 16 but no other symptoms that we can see with lupus or mixed connective tissue disease, will check rest of the serologies -rest of  the symptoms not suggestive of SLE  -BL MCP tenderness and small joint pain we can see with inflammatory arthritis but most of the pain worse with activity no sig morning stiffness and no synovitis on exam, follow rest of the blood work check  bl hand x ray , cw NSAID as need until then takes only few days a week  -no other systemic symptoms of Seronegative arthritis but will check lumbar and pelvic x ray to evaluate for chronic lower back pain

## 2024-11-18 ENCOUNTER — APPOINTMENT (OUTPATIENT)
Dept: URGENT CARE | Age: 34
End: 2024-11-18
Payer: OTHER MISCELLANEOUS

## 2024-11-18 PROCEDURE — 99284 EMERGENCY DEPT VISIT MOD MDM: CPT | Performed by: PHYSICIAN ASSISTANT

## 2024-11-18 PROCEDURE — G0383 LEV 4 HOSP TYPE B ED VISIT: HCPCS | Performed by: PHYSICIAN ASSISTANT

## 2025-01-07 ENCOUNTER — PATIENT MESSAGE (OUTPATIENT)
Age: 35
End: 2025-01-07

## 2025-02-26 ENCOUNTER — OFFICE VISIT (OUTPATIENT)
Age: 35
End: 2025-02-26
Payer: COMMERCIAL

## 2025-02-26 VITALS
SYSTOLIC BLOOD PRESSURE: 114 MMHG | DIASTOLIC BLOOD PRESSURE: 64 MMHG | WEIGHT: 124 LBS | BODY MASS INDEX: 20.66 KG/M2 | HEIGHT: 65 IN

## 2025-02-26 DIAGNOSIS — Z30.09 GENERAL COUNSELING AND ADVICE ON CONTRACEPTIVE MANAGEMENT: Primary | ICD-10-CM

## 2025-02-26 PROCEDURE — 99213 OFFICE O/P EST LOW 20 MIN: CPT | Performed by: OBSTETRICS & GYNECOLOGY

## 2025-03-03 NOTE — PROGRESS NOTES
"Name: Jessica Wu      : 1990      MRN: [id number]  Encounter Provider: Meghana Shipman MD  Encounter Date: 2025   Encounter department: [facility] [place]  :  Assessment & Plan  General counseling and advice on contraceptive management    Comfortable continuing Slynd for now.     We are limited to non-estrogen containing options.   If she desires in the future she would be a candidate for a salpingectomy and endometrial ablation.   Briefly discussed need for tissue sampling/US prior.   Could also consider hysterectomy.   All questions answered.            History of Present Illness   HPI  Jessica Wu is a 34 y.o. female who presents to discuss contraceptive management going forward.  Has some anxiety about availability of her options in current political climate.   Doing overall well with Slynd.    Would like to know what else we could consider.     History obtained from: patient    Review of Systems       Objective   /64   Ht 5' 5\" (1.651 m)   Wt 56.2 kg (124 lb)   LMP 2025 (Exact Date)   BMI 20.63 kg/m²      Physical Exam  Vitals and nursing note reviewed.   Constitutional:       Appearance: Normal appearance.   Neurological:      Mental Status: She is alert.   Psychiatric:         Mood and Affect: Mood normal.         Behavior: Behavior normal.         Administrative Statements   I have spent a total time of 20 minutes in caring for this patient on the day of the visit/encounter including Risks and benefits of tx options.  "

## 2025-04-18 DIAGNOSIS — N92.6 IRREGULAR BLEEDING: ICD-10-CM

## 2025-04-18 RX ORDER — DROSPIRENONE 4 MG/1
1 TABLET, FILM COATED ORAL DAILY
Qty: 84 TABLET | Refills: 3 | Status: SHIPPED | OUTPATIENT
Start: 2025-04-18

## 2025-04-22 ENCOUNTER — TELEPHONE (OUTPATIENT)
Age: 35
End: 2025-04-22

## 2025-04-22 NOTE — TELEPHONE ENCOUNTER
[facility] from [employer] [facility] called stating the Rx sent to them for patient Slynd 4 mg tabs is not covered the alternative is Rx Norethindrone  diana tab 28 , 0.35mg tabs   The provider can send a new Rx   Ref# [id number] phone # [phone]

## 2025-04-23 ENCOUNTER — TELEPHONE (OUTPATIENT)
Age: 35
End: 2025-04-23

## 2025-04-23 NOTE — TELEPHONE ENCOUNTER
PA for Slynd 4 MG SUBMITTED to [facility]    via    []CMM-KEY:   [x][employer]ures[employer]s-Case ID # [id number]   []Availity-Auth ID # NDC #   []Faxed to plan   []Other website   []Phone call Case ID #     []PA sent as URGENT    All office notes, labs and other pertaining documents and studies sent. Clinical questions answered. Awaiting determination from insurance company.     Turnaround time for your insurance to make a decision on your Prior Authorization can take 7-21 business days.

## 2025-04-24 NOTE — TELEPHONE ENCOUNTER
PA for Slynd 4 MG APPROVED     Date(s) approved March 24, 2025 to April 23, 2026     Case # [id number]     Patient advised by          [x]MyChart Message  []Phone call   []LMOM  []L/M to call office as no active Communication consent on file  []Unable to leave detailed message as VM not approved on Communication consent       Pharmacy advised by    [x]Fax  []Phone call  []Secure Chat    Specialty Pharmacy    []     Approval letter scanned into Media Yes     no

## 2025-04-29 NOTE — ASSESSMENT & PLAN NOTE
Preventive therapy for headaches:    She is to continue taking Inderal 80 mg once a day  Cyproheptadine 4 mg half tab at bedtime daily  Magnesium 250 mg   If no improvement after what was discussed, then we need to add a preventative medication  We discussed protriptyline, Lamictal or Zonisamide  ANd we discussed Aimovig once a month injection    Abortive therapy for headaches: At the onset of the headaches take Rizatriptan  May repeat in 2 hours if still have a migraine  Limit of 3/week or 9/month  If that fails she may take Fioricet  Less than 3 times a week  She is to call if she has a status migraine a migraine lasting more than 24 hours, use Decadron  no

## 2025-05-06 ENCOUNTER — OFFICE VISIT (OUTPATIENT)
Dept: FAMILY MEDICINE CLINIC | Facility: CLINIC | Age: 35
End: 2025-05-06
Payer: COMMERCIAL

## 2025-05-06 VITALS
HEART RATE: 85 BPM | HEIGHT: 65 IN | RESPIRATION RATE: 18 BRPM | BODY MASS INDEX: 21.54 KG/M2 | SYSTOLIC BLOOD PRESSURE: 90 MMHG | TEMPERATURE: 98 F | WEIGHT: 129.25 LBS | DIASTOLIC BLOOD PRESSURE: 60 MMHG | OXYGEN SATURATION: 97 %

## 2025-05-06 DIAGNOSIS — J45.20 MILD INTERMITTENT ASTHMA WITHOUT COMPLICATION: ICD-10-CM

## 2025-05-06 DIAGNOSIS — Z00.00 ANNUAL PHYSICAL EXAM: Primary | ICD-10-CM

## 2025-05-06 DIAGNOSIS — G43.709 CHRONIC MIGRAINE WITHOUT AURA WITHOUT STATUS MIGRAINOSUS, NOT INTRACTABLE: ICD-10-CM

## 2025-05-06 PROCEDURE — 99395 PREV VISIT EST AGE 18-39: CPT | Performed by: FAMILY MEDICINE
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
## 2025-05-07 NOTE — ASSESSMENT & PLAN NOTE
Asthma.  Patient's asthma is very well-controlled and she rarely uses albuterol inhaler.  She does use Claritin once daily for history of allergies as well as her Singulair

## 2025-05-13 ENCOUNTER — ANNUAL EXAM (OUTPATIENT)
Age: 35
End: 2025-05-13
Payer: COMMERCIAL

## 2025-05-13 VITALS
DIASTOLIC BLOOD PRESSURE: 64 MMHG | HEIGHT: 65 IN | SYSTOLIC BLOOD PRESSURE: 104 MMHG | BODY MASS INDEX: 21.16 KG/M2 | WEIGHT: 127 LBS

## 2025-05-13 DIAGNOSIS — Z01.419 ENCOUNTER FOR ANNUAL ROUTINE GYNECOLOGICAL EXAMINATION: Primary | ICD-10-CM

## 2025-05-13 DIAGNOSIS — Z12.4 SCREENING FOR CERVICAL CANCER: ICD-10-CM

## 2025-05-13 DIAGNOSIS — Z11.51 SCREENING FOR HUMAN PAPILLOMAVIRUS (HPV): ICD-10-CM

## 2025-05-13 PROCEDURE — S0612 ANNUAL GYNECOLOGICAL EXAMINA: HCPCS | Performed by: OBSTETRICS & GYNECOLOGY

## 2025-05-13 PROCEDURE — G0476 HPV COMBO ASSAY CA SCREEN: HCPCS | Performed by: OBSTETRICS & GYNECOLOGY

## 2025-05-13 PROCEDURE — G0145 SCR C/V CYTO,THINLAYER,RESCR: HCPCS | Performed by: OBSTETRICS & GYNECOLOGY
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
## 2025-05-16 ENCOUNTER — RESULTS FOLLOW-UP (OUTPATIENT)
Age: 35
End: 2025-05-16

## 2025-05-16 LAB
LAB AP GYN PRIMARY INTERPRETATION: NORMAL
Lab: NORMAL

## 2025-05-22 NOTE — TELEPHONE ENCOUNTER
LMOM for patient (ok per consent) that results are negative and provided call back number for any questions or concerns

## 2025-07-14 DIAGNOSIS — R51.9 GENERALIZED HEADACHES: ICD-10-CM

## 2025-07-15 RX ORDER — PROPRANOLOL HYDROCHLORIDE 60 MG/1
60 CAPSULE, EXTENDED RELEASE ORAL DAILY
Qty: 90 CAPSULE | Refills: 1 | Status: SHIPPED | OUTPATIENT
Start: 2025-07-15

## (undated) DEVICE — SUT MONOCRYL 4-0 PS-2 18 IN Y496G

## (undated) DEVICE — PENCIL ELECTROSURG E-Z CLEAN -0035H

## (undated) DEVICE — CHLORAPREP HI-LITE 26ML ORANGE

## (undated) DEVICE — TROCAR: Brand: KII FIOS FIRST ENTRY

## (undated) DEVICE — ADHESIVE SKN CLSR HISTOACRYL FLEX 0.5ML LF

## (undated) DEVICE — SCD SEQUENTIAL COMPRESSION COMFORT SLEEVE MEDIUM KNEE LENGTH: Brand: KENDALL SCD

## (undated) DEVICE — 5 MM CURVED DISSECTORS WITH MONOPOLAR CAUTERY: Brand: ENDOPATH

## (undated) DEVICE — GLOVE SRG BIOGEL ORTHOPEDIC 7.5

## (undated) DEVICE — 3000CC GUARDIAN II: Brand: GUARDIAN

## (undated) DEVICE — ELECTRODE LAP J HOOK E-Z CLEAN 33CM-0021

## (undated) DEVICE — LIGAMAX 5 MM ENDOSCOPIC MULTIPLE CLIP APPLIER: Brand: LIGAMAX

## (undated) DEVICE — PACK PBDS LAP CHOLE RF

## (undated) DEVICE — NEEDLE 25G X 1 1/2

## (undated) DEVICE — TROCAR: Brand: KII® SLEEVE

## (undated) DEVICE — TISSUE RETRIEVAL SYSTEM: Brand: INZII RETRIEVAL SYSTEM

## (undated) DEVICE — INTENDED FOR TISSUE SEPARATION, AND OTHER PROCEDURES THAT REQUIRE A SHARP SURGICAL BLADE TO PUNCTURE OR CUT.: Brand: BARD-PARKER SAFETY BLADES SIZE 11, STERILE

## (undated) DEVICE — SUT VICRYL 0 UR-6 27 IN J603H